# Patient Record
Sex: MALE | Race: WHITE | Employment: OTHER | RURAL
[De-identification: names, ages, dates, MRNs, and addresses within clinical notes are randomized per-mention and may not be internally consistent; named-entity substitution may affect disease eponyms.]

---

## 2017-01-16 DIAGNOSIS — I48.0 INTERMITTENT ATRIAL FIBRILLATION (HCC): Primary | ICD-10-CM

## 2017-01-16 RX ORDER — METOPROLOL SUCCINATE 100 MG/1
100 TABLET, EXTENDED RELEASE ORAL DAILY
Qty: 30 TAB | Refills: 5 | Status: SHIPPED | OUTPATIENT
Start: 2017-01-16 | End: 2018-03-05 | Stop reason: SDUPTHER

## 2017-05-10 RX ORDER — ASPIRIN 81 MG/1
81 TABLET ORAL DAILY
COMMUNITY
Start: 2017-05-10 | End: 2018-10-23

## 2017-05-10 RX ORDER — ALBUTEROL SULFATE 90 UG/1
1 AEROSOL, METERED RESPIRATORY (INHALATION)
Qty: 1 INHALER | Refills: 11 | COMMUNITY
Start: 2017-05-10 | End: 2022-10-18 | Stop reason: ALTCHOICE

## 2017-05-10 RX ORDER — MELATONIN
2000 DAILY
COMMUNITY
Start: 2017-05-10 | End: 2022-10-18 | Stop reason: ALTCHOICE

## 2017-06-26 RX ORDER — METOPROLOL TARTRATE 50 MG/1
TABLET ORAL AS NEEDED
COMMUNITY
Start: 2017-06-26 | End: 2018-03-05 | Stop reason: SDUPTHER

## 2017-06-26 RX ORDER — FLECAINIDE ACETATE 150 MG/1
TABLET ORAL
Qty: 1 TAB | Refills: 0 | COMMUNITY
Start: 2017-06-26 | End: 2018-10-11

## 2017-08-01 ENCOUNTER — OFFICE VISIT (OUTPATIENT)
Dept: FAMILY MEDICINE CLINIC | Age: 57
End: 2017-08-01

## 2017-08-01 VITALS
HEIGHT: 72 IN | HEART RATE: 60 BPM | SYSTOLIC BLOOD PRESSURE: 100 MMHG | WEIGHT: 187 LBS | BODY MASS INDEX: 25.33 KG/M2 | RESPIRATION RATE: 16 BRPM | DIASTOLIC BLOOD PRESSURE: 66 MMHG | OXYGEN SATURATION: 97 %

## 2017-08-01 DIAGNOSIS — E55.9 VITAMIN D DEFICIENCY: ICD-10-CM

## 2017-08-01 DIAGNOSIS — Z00.00 ANNUAL PHYSICAL EXAM: ICD-10-CM

## 2017-08-01 DIAGNOSIS — I48.0 INTERMITTENT ATRIAL FIBRILLATION (HCC): Primary | ICD-10-CM

## 2017-08-01 NOTE — ACP (ADVANCE CARE PLANNING)
Patient states he has an Advance Directive, asked to bring in a copy, is from [place] so might need updated.

## 2017-08-01 NOTE — PROGRESS NOTES
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 He is  Jose Led is an FNP). 2 children-daughter is engaged and son, Jessica Maradiaga, lives at home but will be moving to [place] in Jan 2018. Joseline Gibbs has enjoyed good health. Four discrete episodes of AF and remains in NSR. Follows \"pill in pocket\" strategy and this has worked well for him. Hypoxia at altitude seems to be a trigger. Diamox helped last time they stayed at their  [facility]. FH is + for colon cancer in father and prostate cancer in grandfather. PSA screening had a peak value of 5 and then reverted to normal--felt to be due to cycling. He enjoys traveling and biking. Practiced in [place]. Not on File    Current Outpatient Prescriptions   Medication Sig    flecainide (TAMBOCOR) 150 mg tablet 2 tabs as directed for afib    metoprolol tartrate (LOPRESSOR) 50 mg tablet Daily as directed    cholecalciferol (VITAMIN D3) 1,000 unit tablet Take 2 Tabs by mouth daily.  aspirin delayed-release 81 mg tablet Take 1 Tab by mouth daily.  albuterol (PROVENTIL HFA, VENTOLIN HFA, PROAIR HFA) 90 mcg/actuation inhaler Take 1 Puff by inhalation every six (6) hours as needed for Wheezing. Indications: BRONCHOSPASM PREVENTION    metoprolol succinate (TOPROL-XL) 100 mg tablet Take 1 Tab by mouth daily. No current facility-administered medications for this visit. Past Medical History:   Diagnosis Date    Asthma, mild intermittent     Family history of prostate cancer     Grandfather    Intermittent atrial fibrillation ([facility] [address] 1/16/2017    Left hydrocele     Personal history of colonic polyps     Negative colonoscopy 2015    Vitamin D deficiency          ROS:  Denies fever, chills, cough, chest pain, SOB,  nausea, vomiting, or diarrhea. Denies wt loss, wt gain, hemoptysis, hematochezia or melena.     Physical Examination:    /66 (BP 1 Location: Left arm, BP Patient Position: Sitting)  Pulse 60  Resp 16  Ht 6' (1.829 m)  Wt 187 lb (84.8 kg)  SpO2 97%  BMI 25.36 kg/m2    General: Alert and Ox3, Fluent speech  HEENT:  NC/AT, EOMI, OP: clear  Neck:  Supple, no adenopathy, JVD, mass or bruit  Chest:  Clear to Ausculation, without wheezes, rales, rubs or ronchi  Cardiac: RRR  Abdomen:  +BS, soft, nontender without palpable HSM  Extremities:  No cyanosis, clubbing or edema  Neurologic:  Ambulatory without assist, CN 2-12 grossly intact. Moves all extremities. Skin: no rash  Lymphadenopathy: no cervical or supraclavicular nodes      ASSESSMENT AND PLAN:     1. Beronica Nuñez appears to be in excellent health. Labs today and will contact him with the results  2. PAF: stable and presently in NSR  3.  Vitamin D def:  Checking level  4. Colon cancer screening. 5.  PSA screening.     Orders Placed This Encounter    VITAMIN D, 25 HYDROXY    PROSTATE SPECIFIC AG    METABOLIC PANEL, COMPREHENSIVE    LIPID PANEL    CBC WITH AUTOMATED DIFF       Jose A Kay MD, [address]

## 2017-08-01 NOTE — MR AVS SNAPSHOT
Visit Information Date & Time Provider Department Dept. Phone Encounter #  
 8/1/2017 10:30 AM Omer Mcqueen MD [address] [place] [address] [id number] Upcoming Health Maintenance Date Due Hepatitis C Screening 1960 FOBT Q 1 YEAR AGE 50-75 5/19/2008 INFLUENZA AGE 9 TO ADULT 10/30/2017* DTaP/Tdap/Td series (2 - Td) 7/3/2018 *Topic was postponed. The date shown is not the original due date. Allergies as of 8/1/2017  Never Reviewed Not on File Current Immunizations  Never Reviewed Name Date Pneumococcal Polysaccharide (PPSV-23) 10/18/2012 Tdap 7/3/2008 Not reviewed this visit You Were Diagnosed With   
  
 Codes Comments Intermittent atrial fibrillation (HCC)    -  Primary ICD-10-CM: I48.0 ICD-9-CM: 427.31 Vitamin D deficiency     ICD-10-CM: E55.9 ICD-9-CM: 268.9 Vitals BP Pulse Resp Height(growth percentile) Weight(growth percentile) SpO2  
 100/66 (BP 1 Location: Left arm, BP Patient Position: Sitting) 60 16 6' (1.829 m) 187 lb (84.8 kg) 97% BMI Smoking Status 25.36 kg/m2 Never Smoker BMI and BSA Data Body Mass Index Body Surface Area  
 25.36 kg/m 2 2.08 m 2 Preferred Pharmacy Pharmacy Name Phone [facility] [address] [place] [address] Your Updated Medication List  
  
   
This list is accurate as of: 8/1/17 11:20 AM.  Always use your most recent med list.  
  
  
  
  
 albuterol 90 mcg/actuation inhaler Commonly known as:  PROVENTIL HFA, VENTOLIN HFA, PROAIR HFA Take 1 Puff by inhalation every six (6) hours as needed for Wheezing. Indications: BRONCHOSPASM PREVENTION  
  
 aspirin delayed-release 81 mg tablet Take 1 Tab by mouth daily. cholecalciferol 1,000 unit tablet Commonly known as:  VITAMIN D3 Take 2 Tabs by mouth daily. flecainide 150 mg tablet Commonly known as:  TAMBOCOR  
 2 tabs as directed for afib  
  
 metoprolol succinate 100 mg tablet Commonly known as:  TOPROL-XL Take 1 Tab by mouth daily. metoprolol tartrate 50 mg tablet Commonly known as:  LOPRESSOR Daily as directed Patient Instructions If you have any questions regarding GLAMSQUAD, you may call GLAMSQUAD support at [phone]. Introducing [facility]! [employer] introduces [employer] patient portal. Now you can access parts of your medical record, email your doctor's office, and request medication refills online. 1. In your internet browser, go to [email or web address] 2. Click on the First Time User? Click Here link in the Sign In box. You will see the New Member Sign Up page. 3. Enter your [employer] Access Code exactly as it appears below. You will not need to use this code after youve completed the sign-up process. If you do not sign up before the expiration date, you must request a new code. · [employer] Access Code: [email or web address] Expires: 10/30/2017 10:08 AM 
 
4. Enter the last four digits of your Social Security Number (xxxx) and Date of Birth (mm/dd/yyyy) as indicated and click Submit. You will be taken to the next sign-up page. 5. Create a Jama Software ID. This will be your [employer] Software login ID and cannot be changed, so think of one that is secure and easy to remember. 6. Create a [employer] password. You can change your password at any time. 7. Enter your Password Reset Question and Answer. This can be used at a later time if you forget your password. 8. Enter your e-mail address. You will receive e-mail notification when new information is available in [address] 9. Click Sign Up. You can now view and download portions of your medical record. 10. Click the Download Summary menu link to download a portable copy of your medical information.  
 
If you have questions, please visit the Frequently Asked Questions section of the CrowdComfort. Remember, Diet TVhart is NOT to be used for urgent needs. For medical emergencies, dial 911. Now available from your iPhone and Android! Please provide this summary of care documentation to your next provider. Your primary care clinician is listed as Talya Wiley. If you have any questions after today's visit, please call [phone].

## 2017-08-02 LAB
25(OH)D3+25(OH)D2 SERPL-MCNC: 42 NG/ML (ref 30–100)
ALBUMIN SERPL-MCNC: 4.4 G/DL (ref 3.5–5.5)
ALBUMIN/GLOB SERPL: 1.8 {RATIO} (ref 1.2–2.2)
ALP SERPL-CCNC: 49 IU/L (ref 39–117)
ALT SERPL-CCNC: 13 IU/L (ref 0–44)
AST SERPL-CCNC: 19 IU/L (ref 0–40)
BASOPHILS # BLD AUTO: 0 X10E3/UL (ref 0–0.2)
BASOPHILS NFR BLD AUTO: 1 %
BILIRUB SERPL-MCNC: 0.8 MG/DL (ref 0–1.2)
BUN SERPL-MCNC: 11 MG/DL (ref 6–24)
BUN/CREAT SERPL: 13 (ref 9–20)
CALCIUM SERPL-MCNC: 9.2 MG/DL (ref 8.7–10.2)
CHLORIDE SERPL-SCNC: 101 MMOL/L (ref 96–106)
CHOLEST SERPL-MCNC: 221 MG/DL (ref 100–199)
CO2 SERPL-SCNC: 26 MMOL/L (ref 18–29)
CREAT SERPL-MCNC: 0.84 MG/DL (ref 0.76–1.27)
EOSINOPHIL # BLD AUTO: 0.1 X10E3/UL (ref 0–0.4)
EOSINOPHIL NFR BLD AUTO: 1 %
ERYTHROCYTE [DISTWIDTH] IN BLOOD BY AUTOMATED COUNT: 13.5 % (ref 12.3–15.4)
GLOBULIN SER CALC-MCNC: 2.4 G/DL (ref 1.5–4.5)
GLUCOSE SERPL-MCNC: 118 MG/DL (ref 65–99)
HCT VFR BLD AUTO: 45.2 % (ref 37.5–51)
HDLC SERPL-MCNC: 67 MG/DL
HGB BLD-MCNC: 15.6 G/DL (ref 12.6–17.7)
IMM GRANULOCYTES # BLD: 0 X10E3/UL (ref 0–0.1)
IMM GRANULOCYTES NFR BLD: 0 %
LDLC SERPL CALC-MCNC: 136 MG/DL (ref 0–99)
LYMPHOCYTES # BLD AUTO: 1.3 X10E3/UL (ref 0.7–3.1)
LYMPHOCYTES NFR BLD AUTO: 21 %
MCH RBC QN AUTO: 31.3 PG (ref 26.6–33)
MCHC RBC AUTO-ENTMCNC: 34.5 G/DL (ref 31.5–35.7)
MCV RBC AUTO: 91 FL (ref 79–97)
MONOCYTES # BLD AUTO: 0.5 X10E3/UL (ref 0.1–0.9)
MONOCYTES NFR BLD AUTO: 8 %
NEUTROPHILS # BLD AUTO: 4.2 X10E3/UL (ref 1.4–7)
NEUTROPHILS NFR BLD AUTO: 69 %
PLATELET # BLD AUTO: 196 X10E3/UL (ref 150–379)
POTASSIUM SERPL-SCNC: 4.4 MMOL/L (ref 3.5–5.2)
PROT SERPL-MCNC: 6.8 G/DL (ref 6–8.5)
PSA SERPL-MCNC: 2.3 NG/ML (ref 0–4)
RBC # BLD AUTO: 4.98 X10E6/UL (ref 4.14–5.8)
SODIUM SERPL-SCNC: 141 MMOL/L (ref 134–144)
TRIGL SERPL-MCNC: 91 MG/DL (ref 0–149)
VLDLC SERPL CALC-MCNC: 18 MG/DL (ref 5–40)
WBC # BLD AUTO: 6 X10E3/UL (ref 3.4–10.8)

## 2017-10-24 ENCOUNTER — TELEPHONE (OUTPATIENT)
Dept: FAMILY MEDICINE CLINIC | Age: 57
End: 2017-10-24

## 2017-10-24 RX ORDER — CEPHALEXIN 500 MG/1
500 CAPSULE ORAL 4 TIMES DAILY
Qty: 40 CAP | Refills: 0 | Status: SHIPPED | OUTPATIENT
Start: 2017-10-24 | End: 2017-10-31 | Stop reason: SDUPTHER

## 2017-10-24 NOTE — TELEPHONE ENCOUNTER
Right elbow red and swollen and tender  Per pt since weekend with low grade temp  Will call in Keflex

## 2017-10-31 RX ORDER — CEPHALEXIN 500 MG/1
500 CAPSULE ORAL 4 TIMES DAILY
Qty: 40 CAP | Refills: 0 | Status: SHIPPED | OUTPATIENT
Start: 2017-10-31 | End: 2017-11-10

## 2017-10-31 NOTE — PROGRESS NOTES
Redness of right elbow better but still some present  Still puffy and a little tender  May need another course of Keflex  Called in a refill

## 2017-11-09 ENCOUNTER — TELEPHONE (OUTPATIENT)
Dept: FAMILY MEDICINE CLINIC | Age: 57
End: 2017-11-09

## 2017-11-09 DIAGNOSIS — I48.0 PAROXYSMAL ATRIAL FIBRILLATION (HCC): Primary | ICD-10-CM

## 2017-12-04 ENCOUNTER — OFFICE VISIT (OUTPATIENT)
Dept: CARDIOLOGY CLINIC | Age: 57
End: 2017-12-04

## 2017-12-04 VITALS
DIASTOLIC BLOOD PRESSURE: 72 MMHG | HEART RATE: 51 BPM | BODY MASS INDEX: 25.47 KG/M2 | WEIGHT: 188 LBS | HEIGHT: 72 IN | SYSTOLIC BLOOD PRESSURE: 104 MMHG | OXYGEN SATURATION: 98 %

## 2017-12-04 DIAGNOSIS — I48.0 PAROXYSMAL A-FIB (HCC): Primary | ICD-10-CM

## 2017-12-04 NOTE — PROGRESS NOTES
PATIENT ID VERIFIED WITH TWO PATIENT IDENTIFIERS. PATIENT MEDICATIONS REVIEWED AND APPROVED BY DR. Nelsy Bahena. MEDICATIONS THAT WERE REMOVED FROM THIS VISIT HAVE BEEN APPROVED BY DR. Nelsy Bahena. Chief Complaint   Patient presents with   [facility]     New patient evaluation     Irregular Heart Beat       1. Have you been to the ER, urgent care clinic since your last visit? Hospitalized since your last visit? New patient evaluation    2. Have you seen or consulted any other health care providers outside of the [address] [place] [address] since your last visit?   New patient evaluation

## 2017-12-04 NOTE — MR AVS SNAPSHOT
Visit Information Date & Time Provider Department Dept. Phone Encounter #  
 12/4/2017  3:20 PM Janis Guerra, [facility] [phone] [id number] Upcoming Health Maintenance Date Due Hepatitis C Screening 1960 FOBT Q 1 YEAR AGE 50-75 5/19/2008 DTaP/Tdap/Td series (2 - Td) 7/3/2018 Allergies as of 12/4/2017  Review Complete On: 12/4/2017 By: Yuan Loco No Known Allergies Current Immunizations  Reviewed on 9/25/2017 Name Date Influenza Vaccine 9/25/2017 Pneumococcal Polysaccharide (PPSV-23) 10/18/2012 Tdap 7/3/2008 Not reviewed this visit You Were Diagnosed With   
  
 Codes Comments Paroxysmal a-fib (HCC)    -  Primary ICD-10-CM: I48.0 ICD-9-CM: 427.31 Vitals BP Pulse Height(growth percentile) Weight(growth percentile) SpO2 BMI  
 104/72 (BP 1 Location: Right arm, BP Patient Position: Sitting) (!) 51 6' (1.829 m) 188 lb (85.3 kg) 98% 25.5 kg/m2 Smoking Status Never Smoker Vitals History BMI and BSA Data Body Mass Index Body Surface Area 25.5 kg/m 2 2.08 m 2 Preferred Pharmacy Pharmacy Name Phone [facility] [address] [place] [address], [address] [place] [address] Your Updated Medication List  
  
   
This list is accurate as of: 12/4/17  3:58 PM.  Always use your most recent med list.  
  
  
  
  
 albuterol 90 mcg/actuation inhaler Commonly known as:  PROVENTIL HFA, VENTOLIN HFA, PROAIR HFA Take 1 Puff by inhalation every six (6) hours as needed for Wheezing. Indications: BRONCHOSPASM PREVENTION  
  
 aspirin delayed-release 81 mg tablet Take 1 Tab by mouth daily. cholecalciferol 1,000 unit tablet Commonly known as:  VITAMIN D3 Take 2 Tabs by mouth daily. flecainide 150 mg tablet Commonly known as:  TAMBOCOR  
2 tabs prn as directed for afib  
  
 metoprolol succinate 100 mg tablet Commonly known as:  TOPROL-XL Take 1 Tab by mouth daily. metoprolol tartrate 50 mg tablet Commonly known as:  LOPRESSOR  
as needed. Daily as directed We Performed the Following AMB POC EKG ROUTINE W/ 12 LEADS, INTER & REP [47063 CPT(R)] To-Do List   
 Around 12/07/2017 ECG:  STRESS TEST CARDIAC Introducing [facility]! [employer] introduces el? patient portal. Now you can access parts of your medical record, email your doctor's office, and request medication refills online. 1. In your internet browser, go to [email or web address] 2. Click on the First Time User? Click Here link in the Sign In box. You will see the New Member Sign Up page. 3. Enter your el? Access Code exactly as it appears below. You will not need to use this code after youve completed the sign-up process. If you do not sign up before the expiration date, you must request a new code. · el? Access Code: [email or web address] Expires: 3/4/2018  3:57 PM 
 
4. Enter the last four digits of your Social Security Number (xxxx) and Date of Birth (mm/dd/yyyy) as indicated and click Submit. You will be taken to the next sign-up page. 5. Create a el? ID. This will be your el? login ID and cannot be changed, so think of one that is secure and easy to remember. 6. Create a el? password. You can change your password at any time. 7. Enter your Password Reset Question and Answer. This can be used at a later time if you forget your password. 8. Enter your e-mail address. You will receive e-mail notification when new information is available in [address] 9. Click Sign Up. You can now view and download portions of your medical record. 10. Click the Download Summary menu link to download a portable copy of your medical information.  
 
If you have questions, please visit the Frequently Asked Questions section of the Shiftgig. Remember, Qbox.iohart is NOT to be used for urgent needs. For medical emergencies, dial 911. Now available from your iPhone and Android! Please provide this summary of care documentation to your next provider. Your primary care clinician is listed as Slava Perez. If you have any questions after today's visit, please call [phone].

## 2017-12-04 NOTE — PROGRESS NOTES
Favio Marvin is a 59 y.o. male is here to establish local cardiac care. Hx paroxysmal afib/lone afib (structurally normal heart), onset 6 yrs ago. Initial episode with IV cardizem, to NSR and on metoprolol XL 50. Had w/u with stress MPI and Echo/dopper--normal.  Recurrent episode, and given po flecainide with restoration of NSR. Previous Cardiologist opted for \"pill in pocket\" approach--takes Metoprolol XL 50mg every day, has metoprolol tartrate and flecainide to take if recurrent sustained episode. Last episode(s) triggered by staying in cabin in [place] at [facility], no problems since as takes diamox prior. Last stress test 2 yrs ago. Takes ASA 81, CHADs2-VASC is 0. Physically active--bikes, nordic track, kayaks, etc.  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 No hx hypertension, DM, CHF, valvular, other. Mild dyslipidemia, dietary rx. The patient denies chest pain/ shortness of breath, orthopnea, PND, LE edema, palpitations, syncope, presyncope or fatigue. Patient Active Problem List    Diagnosis Date Noted    Vitamin D deficiency     Personal history of colonic polyps     Intermittent atrial fibrillation ([facility] [address] 01/16/2017      Murphy العراقي MD  Past Medical History:   Diagnosis Date    Asthma, mild intermittent     Family history of prostate cancer     Grandfather    Intermittent atrial fibrillation ([facility] [address] 1/16/2017    Left hydrocele     Personal history of colonic polyps     Negative colonoscopy 2015    Vitamin D deficiency       Past Surgical History:   Procedure Laterality Date    HX ARTHROPLASTY Right     HX COLONOSCOPY  06/11/2015     No Known Allergies   No family history on file. Social History     Social History    Marital status:      Spouse name: N/A    Number of children: N/A    Years of education: N/A     Occupational History    Not on file.      Social History Main Topics    Smoking status: Never Smoker    Smokeless tobacco: Never Used    Alcohol use Not on file    Drug use: Not on file    Sexual activity: Not on file     Other Topics Concern    Not on file     Social History Narrative      Current Outpatient Prescriptions   Medication Sig    metoprolol tartrate (LOPRESSOR) 50 mg tablet as needed. Daily as directed    cholecalciferol (VITAMIN D3) 1,000 unit tablet Take 2 Tabs by mouth daily.  aspirin delayed-release 81 mg tablet Take 1 Tab by mouth daily.  albuterol (PROVENTIL HFA, VENTOLIN HFA, PROAIR HFA) 90 mcg/actuation inhaler Take 1 Puff by inhalation every six (6) hours as needed for Wheezing. Indications: BRONCHOSPASM PREVENTION    metoprolol succinate (TOPROL-XL) 100 mg tablet Take 1 Tab by mouth daily. (Patient taking differently: Take 50 mg by mouth daily.)    flecainide (TAMBOCOR) 150 mg tablet 2 tabs prn as directed for afib     No current facility-administered medications for this visit. Review of Symptoms:    CONST  No weight change. No fever, chills, sweats    ENT No visual changes, URI sx, sore throat    CV  See HPI   RESP  No cough, or sputum, wheezing. Also see HPI   GI  No abdominal pain or change in bowel habits. No heartburn or dysphagia. No melena or rectal bleeding.   No dysuria, urgency, frequency, hematuria   MSKEL  No joint pain, swelling. No muscle pain. SKIN  No rash or lesions. NEURO  No headache, syncope, or seizure. No weakness, loss of sensation, or paresthesias. PSYCH  No low mood or depression  No anxiety. HE/LYMPH  No easy bruising, abnormal bleeding, or enlarged glands.         Physical ExamPhysical Exam:    Visit Vitals    /72 (BP 1 Location: Right arm, BP Patient Position: Sitting)    Pulse (!) 51    Ht 6' (1.829 m)    Wt 188 lb (85.3 kg)    SpO2 98%    BMI 25.5 kg/m2     Gen: NAD  HEENT:  PERRL, throat clear  Neck: no adenopathy, no thyromegaly, no JVD   Heart:  Regular,Nl S1S2,  no murmur, gallop or rub.   Lungs:  clear  Abdomen:   Soft, non-tender, bowel sounds are active.   Extremities:  No edema  Pulse: symmetric  Neuro: A&O times 3, No focal neuro deficits    Cardiographics    ECG: NSR, IRBBB, LAFB      Labs:   Lab Results   Component Value Date/Time    Sodium 141 08/01/2017 11:21 AM    Potassium 4.4 08/01/2017 11:21 AM    Chloride 101 08/01/2017 11:21 AM    CO2 26 08/01/2017 11:21 AM    Glucose 118 08/01/2017 11:21 AM    BUN 11 08/01/2017 11:21 AM    Creatinine 0.84 08/01/2017 11:21 AM    BUN/Creatinine ratio 13 08/01/2017 11:21 AM    GFR est  08/01/2017 11:21 AM    GFR est non-AA 96 08/01/2017 11:21 AM    Calcium 9.2 08/01/2017 11:21 AM    Bilirubin, total 0.8 08/01/2017 11:21 AM    AST (SGOT) 19 08/01/2017 11:21 AM    Alk. phosphatase 49 08/01/2017 11:21 AM    Protein, total 6.8 08/01/2017 11:21 AM    Albumin 4.4 08/01/2017 11:21 AM    A-G Ratio 1.8 08/01/2017 11:21 AM    ALT (SGPT) 13 08/01/2017 11:21 AM     No results found for: CPK, CPKX, CPX  Lab Results   Component Value Date/Time    Cholesterol, total 221 08/01/2017 11:21 AM    HDL Cholesterol 67 08/01/2017 11:21 AM    LDL, calculated 136 08/01/2017 11:21 AM    Triglyceride 91 08/01/2017 11:21 AM     No results found for this or any previous visit. Assessment:         Patient Active Problem List    Diagnosis Date Noted    Vitamin D deficiency     Personal history of colonic polyps     Intermittent atrial fibrillation ([facility] [address] 01/16/2017       Hx paroxysmal afib/lone afib (structurally normal heart), onset 6 yrs ago. Initial episode with IV cardizem, to NSR and on metoprolol XL 50. Had w/u with stress MPI and Echo/dopper--normal.  Recurrent episode, and given po flecainide with restoration of NSR. Previous Cardiologist opted for \"pill in pocket\" approach--takes Metoprolol XL 50mg every day, has metoprolol tartrate and flecainide to take if recurrent sustained episode. Last episode(s) triggered by staying in cabin in [place] at [facility], no problems since as takes diamox prior. Last stress test 2 yrs ago. Takes ASA 81, CHADs2-VASC is 0. Physically active--bikes, nordic track, kayaks, etc.  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 No hx hypertension, DM, CHF, valvular, other. Mild dyslipidemia, dietary rx. Plan:     Doing well with no adverse cardiac symptoms--pill in pocket approach as outlined very reasonable  Stress test (last 2 yrs ago, using type Ic antiarrhythmic rx)  F/u with PCP as planned--  Lipids and labs followed by PCP. Continue current care and f/u in 12 months.     Ashly Paris MD

## 2018-03-05 DIAGNOSIS — I48.0 INTERMITTENT ATRIAL FIBRILLATION (HCC): ICD-10-CM

## 2018-03-05 RX ORDER — METOPROLOL SUCCINATE 100 MG/1
50 TABLET, EXTENDED RELEASE ORAL DAILY
Qty: 30 TAB | Refills: 5 | Status: SHIPPED | OUTPATIENT
Start: 2018-03-05 | End: 2018-03-05 | Stop reason: SDUPTHER

## 2018-03-08 RX ORDER — METOPROLOL SUCCINATE 100 MG/1
TABLET, EXTENDED RELEASE ORAL
Qty: 90 TAB | Refills: 0 | Status: SHIPPED | OUTPATIENT
Start: 2018-03-08 | End: 2018-07-14 | Stop reason: SDUPTHER

## 2018-04-02 ENCOUNTER — TELEPHONE (OUTPATIENT)
Dept: FAMILY MEDICINE CLINIC | Age: 58
End: 2018-04-02

## 2018-04-02 DIAGNOSIS — Z86.010 HISTORY OF COLON POLYPS: Primary | ICD-10-CM

## 2018-06-15 ENCOUNTER — ANESTHESIA (OUTPATIENT)
Dept: ENDOSCOPY | Age: 58
End: 2018-06-15
Payer: COMMERCIAL

## 2018-06-15 ENCOUNTER — HOSPITAL ENCOUNTER (OUTPATIENT)
Age: 58
Setting detail: OUTPATIENT SURGERY
Discharge: HOME | End: 2018-06-15
Attending: INTERNAL MEDICINE | Admitting: INTERNAL MEDICINE
Payer: COMMERCIAL

## 2018-06-15 ENCOUNTER — ANESTHESIA EVENT (OUTPATIENT)
Dept: ENDOSCOPY | Age: 58
End: 2018-06-15
Payer: COMMERCIAL

## 2018-06-15 VITALS
DIASTOLIC BLOOD PRESSURE: 69 MMHG | TEMPERATURE: 97.7 F | SYSTOLIC BLOOD PRESSURE: 103 MMHG | BODY MASS INDEX: 25.6 KG/M2 | OXYGEN SATURATION: 98 % | WEIGHT: 189 LBS | HEIGHT: 72 IN | RESPIRATION RATE: 9 BRPM | HEART RATE: 74 BPM

## 2018-06-15 PROCEDURE — 74011250636 HC RX REV CODE- 250/636

## 2018-06-15 PROCEDURE — 74011250637 HC RX REV CODE- 250/637: Performed by: INTERNAL MEDICINE

## 2018-06-15 PROCEDURE — 74011000250 HC RX REV CODE- 250

## 2018-06-15 PROCEDURE — 76060000031 HC ANESTHESIA FIRST 0.5 HR: Performed by: INTERNAL MEDICINE

## 2018-06-15 PROCEDURE — 74011250636 HC RX REV CODE- 250/636: Performed by: INTERNAL MEDICINE

## 2018-06-15 PROCEDURE — 76040000019: Performed by: INTERNAL MEDICINE

## 2018-06-15 RX ORDER — SODIUM CHLORIDE 0.9 % (FLUSH) 0.9 %
5-10 SYRINGE (ML) INJECTION AS NEEDED
Status: DISCONTINUED | OUTPATIENT
Start: 2018-06-15 | End: 2018-06-15 | Stop reason: HOSPADM

## 2018-06-15 RX ORDER — FLUMAZENIL 0.1 MG/ML
0.2 INJECTION INTRAVENOUS
Status: DISCONTINUED | OUTPATIENT
Start: 2018-06-15 | End: 2018-06-15 | Stop reason: HOSPADM

## 2018-06-15 RX ORDER — SODIUM CHLORIDE 0.9 % (FLUSH) 0.9 %
5-10 SYRINGE (ML) INJECTION EVERY 8 HOURS
Status: DISCONTINUED | OUTPATIENT
Start: 2018-06-15 | End: 2018-06-15 | Stop reason: HOSPADM

## 2018-06-15 RX ORDER — MIDAZOLAM HYDROCHLORIDE 1 MG/ML
.25-5 INJECTION, SOLUTION INTRAMUSCULAR; INTRAVENOUS
Status: DISCONTINUED | OUTPATIENT
Start: 2018-06-15 | End: 2018-06-15 | Stop reason: HOSPADM

## 2018-06-15 RX ORDER — ATROPINE SULFATE 0.1 MG/ML
0.5 INJECTION INTRAVENOUS
Status: DISCONTINUED | OUTPATIENT
Start: 2018-06-15 | End: 2018-06-15 | Stop reason: HOSPADM

## 2018-06-15 RX ORDER — PROPOFOL 10 MG/ML
INJECTION, EMULSION INTRAVENOUS AS NEEDED
Status: DISCONTINUED | OUTPATIENT
Start: 2018-06-15 | End: 2018-06-15 | Stop reason: HOSPADM

## 2018-06-15 RX ORDER — LIDOCAINE HYDROCHLORIDE 20 MG/ML
INJECTION, SOLUTION EPIDURAL; INFILTRATION; INTRACAUDAL; PERINEURAL AS NEEDED
Status: DISCONTINUED | OUTPATIENT
Start: 2018-06-15 | End: 2018-06-15 | Stop reason: HOSPADM

## 2018-06-15 RX ORDER — DEXTROMETHORPHAN/PSEUDOEPHED 2.5-7.5/.8
1.2 DROPS ORAL
Status: DISCONTINUED | OUTPATIENT
Start: 2018-06-15 | End: 2018-06-15 | Stop reason: HOSPADM

## 2018-06-15 RX ORDER — SODIUM CHLORIDE 9 MG/ML
100 INJECTION, SOLUTION INTRAVENOUS CONTINUOUS
Status: DISCONTINUED | OUTPATIENT
Start: 2018-06-15 | End: 2018-06-15 | Stop reason: HOSPADM

## 2018-06-15 RX ORDER — FENTANYL CITRATE 50 UG/ML
50 INJECTION, SOLUTION INTRAMUSCULAR; INTRAVENOUS
Status: DISCONTINUED | OUTPATIENT
Start: 2018-06-15 | End: 2018-06-15 | Stop reason: HOSPADM

## 2018-06-15 RX ORDER — NALOXONE HYDROCHLORIDE 0.4 MG/ML
0.4 INJECTION, SOLUTION INTRAMUSCULAR; INTRAVENOUS; SUBCUTANEOUS
Status: DISCONTINUED | OUTPATIENT
Start: 2018-06-15 | End: 2018-06-15 | Stop reason: HOSPADM

## 2018-06-15 RX ADMIN — PROPOFOL 300 MG: 10 INJECTION, EMULSION INTRAVENOUS at 08:45

## 2018-06-15 RX ADMIN — SODIUM CHLORIDE 100 ML/HR: 900 INJECTION, SOLUTION INTRAVENOUS at 08:22

## 2018-06-15 RX ADMIN — LIDOCAINE HYDROCHLORIDE 50 MG: 20 INJECTION, SOLUTION EPIDURAL; INFILTRATION; INTRACAUDAL; PERINEURAL at 08:30

## 2018-06-15 RX ADMIN — SIMETHICONE 80 MG: 20 SUSPENSION/ DROPS ORAL at 08:35

## 2018-06-15 NOTE — ANESTHESIA POSTPROCEDURE EVALUATION
Post-Anesthesia Evaluation and Assessment    Patient: Addy De Dios MRN: [id number]  SSN: [id number]    YOB: 1960  Age: 59 y.o. Sex: male       Cardiovascular Function/Vital Signs  Visit Vitals    /69    Pulse 74    Temp 36.5 °C (97.7 °F)    Resp 9    Ht 6' (1.829 m)    Wt 85.7 kg (189 lb)    SpO2 98%    BMI 25.63 kg/m2       Patient is status post general, total IV anesthesia anesthesia for Procedure(s):  COLONOSCOPY. Nausea/Vomiting: None    Postoperative hydration reviewed and adequate. Pain:  Pain Scale 1: Numeric (0 - 10) (06/15/18 0912)  Pain Intensity 1: 0 (06/15/18 0912)   Managed    Neurological Status: At baseline    Mental Status and Level of Consciousness: Arousable    Pulmonary Status:   O2 Device: Room air (06/15/18 0912)   Adequate oxygenation and airway patent    Complications related to anesthesia: None    Post-anesthesia assessment completed.  No concerns    Signed By: Coby Rowe MD     Iva 15, 2018

## 2018-06-15 NOTE — ROUTINE PROCESS
Evan Fajardo  1960  [id number]    Situation:  Verbal report received from: Krystyna Ross  Procedure: Procedure(s):  COLONOSCOPY    Background:    Preoperative diagnosis: FAMILY HISTORY COLON CANCER  Postoperative diagnosis: Diverticulosis. hemorrhoids, history of polyps    :  Dr. Karlee Mccann  Assistant(s): Endoscopy Technician-1: Cece Talavera  Endoscopy RN-1: Yadira Johnson RN    Specimens: * No specimens in log *  H. Pylori  no    Assessment:  Intra-procedure medications   Anesthesia gave intra-procedure sedation and medications, see anesthesia flow sheet yes    Intravenous fluids: NS@ KVO     Vital signs stable     Abdominal assessment: round and soft     Recommendation:  Discharge patient per MD order.   Return to floor  Family or Friend   Permission to share finding with family or friend yes

## 2018-06-15 NOTE — PERIOP NOTES
Anesthesia reports 300mg Propofol, 50mg Lidocaine and 300mL NS given during procedure. Received report from anesthesia staff on vital signs and status of patient.

## 2018-06-15 NOTE — PROCEDURES
[facility]                  Colonoscopy Operative Report    6/15/2018      Lisette Montemayor  [id number]  1960    Procedure Type:   Colonoscopy --screening     Indications:    Family history of coloretal cancer (screening only), Personal history of colon polyps (screening only)     Pre-operative Diagnosis: see indication above    Post-operative Diagnosis:  See findings below    :  Tay Pickard MD    Referring Provider: Arianna Helton MD      Sedation:  MAC anesthesia Propofol    Pre-Procedural Exam:      Airway: clear,  No airway problems anticipated  Heart: RRR, without gallops or rubs  Lungs: clear bilaterally without wheezes, crackles, or rhonchi  Abdomen: soft, nontender, nondistended, bowel sounds present  Mental Status: awake, alert and oriented to person, place and time     Procedure Details:  After informed consent was obtained with all risks and benefits of procedure explained and preoperative exam completed, the patient was taken to the endoscopy suite and placed in the left lateral decubitus position. Upon sequential sedation as per above, a digital rectal exam was performed . The Olympus videocolonoscope  was inserted in the rectum and carefully advanced to the cecum, which was identified by the ileocecal valve and appendiceal orifice. The cecum was identified by the ileocecal valve and appendiceal orifice. The quality of preparation was good. The colonoscope was slowly withdrawn with careful evaluation between folds. Retroflexion in the rectum was completed demonstrating internal hemorrhoids. Findings:   Rectum: Grade 1 internal hemorrhoid(s);   Sigmoid:     - Diverticulosis, mild  Descending Colon:     - Diverticulosis, mild  Transverse Colon: no mucosal lesion appreciated  Ascending Colon: no mucosal lesion appreciated  Cecum: no mucosal lesion appreciated  Terminal Ileum: not intubated      Specimen Removed:  none    Complications: None.     EBL:  None. Impression:    Mild left sided diverticulosis and small internal hemorrhoids. Recommendations: --Repeat colonoscopy in 5 years. High fiber diet. Resume normal medication(s). Discharge Disposition:  Home in the company of a  when able to ambulate. Kathryn Burk MD    6/15/2018     JOSE Chris MD  [facility], [address] [place] [address]  [phone]  [email or web address]

## 2018-06-15 NOTE — DISCHARGE INSTRUCTIONS
Nola Ramirez MD  [facility], [address]  [place], [address] [facility]  [phone]  [email or web address]    Royden Alpers  [id number]  1960    COLON DISCHARGE INSTRUCTIONS  Discomfort:  Redness at IV site- apply warm compress to area; if redness or soreness persist- contact your physician  There may be a slight amount of blood passed from the rectum  Gaseous discomfort- walking, belching will help relieve any discomfort  You may not operate a vehicle for 12 hours  You may not engage in an occupation involving machinery or appliances for rest of today  You may not drink alcoholic beverages for at least 12 hours  Avoid making any critical decisions for at least 24 hour  DIET:   High fiber diet. - however -  remember your colon is empty and a heavy meal will produce gas. Avoid these foods:  vegetables, fried / greasy foods, carbonated drinks for today      ACTIVITY:  You may resume your normal daily activities it is recommended that you spend the remainder of the day resting -  avoid any strenuous activity. CALL M.D. ANY SIGN OF:   Increasing pain, nausea, vomiting  Abdominal distension (swelling)  New increased bleeding (oral or rectal)  Fever (chills)     COLONOSCOPY FINDINGS:  Your colonoscopy showed: mild left sided diverticulosis and small internal hemorrhoids. No polyps found. Follow-up Instructions:   Call Dr. Nola Ramirez if any questions or problems. Telephone # [phone]    Should have a repeat colonoscopy in 5 years.

## 2018-06-15 NOTE — IP AVS SNAPSHOT
[address] [facility] 
[phone] Patient: Manjeet Hidalgo MRN: [id number] About your hospitalization You were admitted on:  Iva 15, 2018 You last received care in the:  [facility] ENDOSCOPY You were discharged on:  Iva 15, 2018 Why you were hospitalized Your primary diagnosis was:  Not on File Follow-up Information Follow up With Details Comments Contact Info Mirian Alejandre MD   [address],[facility] Floor [facility] [phone] Discharge Orders None A check karen indicates which time of day the medication should be taken. My Medications CHANGE how you take these medications Instructions Each Dose to Equal  
 Morning Noon Evening Bedtime  
 metoprolol succinate 100 mg tablet Commonly known as:  TOPROL-XL What changed:   
- how much to take - when to take this 
- additional instructions Your last dose was: Your next dose is:    
   
   
 1/2 to 1 tab daily as directed CONTINUE taking these medications Instructions Each Dose to Equal  
 Morning Noon Evening Bedtime  
 albuterol 90 mcg/actuation inhaler Commonly known as:  PROVENTIL HFA, VENTOLIN HFA, PROAIR HFA Your last dose was: Your next dose is: Take 1 Puff by inhalation every six (6) hours as needed for Wheezing. Indications: BRONCHOSPASM PREVENTION  
 1 Puff  
    
   
   
   
  
 aspirin delayed-release 81 mg tablet Your last dose was: Your next dose is: Take 1 Tab by mouth daily. 81 mg  
    
   
   
   
  
 cholecalciferol 1,000 unit tablet Commonly known as:  VITAMIN D3 Your last dose was: Your next dose is: Take 2 Tabs by mouth daily. 2000 Units  
    
   
   
   
  
 flecainide 150 mg tablet Commonly known as:  TAMBOCOR Your last dose was: Your next dose is:    
   
   
 2 tabs prn as directed for afib Discharge Instructions Yohan Benton MD 
[facility], [facility], [address] 
[phone] 
[email or web address] [place] Alpers 
[id number] 1960 COLON DISCHARGE INSTRUCTIONS Discomfort: 
Redness at IV site- apply warm compress to area; if redness or soreness persist- contact your physician There may be a slight amount of blood passed from the rectum Gaseous discomfort- walking, belching will help relieve any discomfort You may not operate a vehicle for 12 hours You may not engage in an occupation involving machinery or appliances for rest of today You may not drink alcoholic beverages for at least 12 hours Avoid making any critical decisions for at least 24 hour DIET: 
 High fiber diet.  however -  remember your colon is empty and a heavy meal will produce gas. Avoid these foods:  vegetables, fried / greasy foods, carbonated drinks for today ACTIVITY: 
You may resume your normal daily activities it is recommended that you spend the remainder of the day resting -  avoid any strenuous activity. CALL M.D. ANY SIGN OF: Increasing pain, nausea, vomiting Abdominal distension (swelling) New increased bleeding (oral or rectal) Fever (chills) COLONOSCOPY FINDINGS: 
Your colonoscopy showed: mild left sided diverticulosis and small internal hemorrhoids. No polyps found. Follow-up Instructions: 
 Call Dr. Yohan Benton if any questions or problems. Telephone # [phone] Should have a repeat colonoscopy in 5 years. Introducing \[facility] [place] [facility]\"" & HEALTH SERVICES! Vinod Morris introduces Zing patient portal. Now you can access parts of your medical record, email your doctor's office, and request medication refills online.    
 
1. In your internet browser, go to [email or web address] 2. Click on the First Time User? Click Here link in the Sign In box. You will see the New Member Sign Up page. 3. Enter your icanbuy Access Code exactly as it appears below. You will not need to use this code after youve completed the sign-up process. If you do not sign up before the expiration date, you must request a new code. · icanbuy Access Code: [email or web address] Expires: 9/13/2018  7:36 AM 
 
4. Enter the last four digits of your Social Security Number (xxxx) and Date of Birth (mm/dd/yyyy) as indicated and click Submit. You will be taken to the next sign-up page. 5. Create a [employer] ID. This will be your icanbuy login ID and cannot be changed, so think of one that is secure and easy to remember. 6. Create a icanbuy password. You can change your password at any time. 7. Enter your Password Reset Question and Answer. This can be used at a later time if you forget your password. 8. Enter your e-mail address. You will receive e-mail notification when new information is available in [address] 9. Click Sign Up. You can now view and download portions of your medical record. 10. Click the Download Summary menu link to download a portable copy of your medical information. If you have questions, please visit the Frequently Asked Questions section of the icanbuy website. Remember, icanbuy is NOT to be used for urgent needs. For medical emergencies, dial 911. Now available from your iPhone and Android! Introducing Randolph Cortes As a [employer] patient, I wanted to make you aware of our electronic visit tool called Randolph Cortes. [employer] 24/7 allows you to connect within minutes with a medical provider 24 hours a day, seven days a week via a mobile device or tablet or logging into a secure website from your computer. You can access Randolph Cortes from anywhere in the [place]. A virtual visit might be right for you when you have a simple condition and feel like you just dont want to get out of bed, or cant get away from work for an appointment, when your regular [employer] provider is not available (evenings, weekends or holidays), or when youre out of town and need minor care. Electronic visits cost only $49 and if the [employer] 24/7 provider determines a prescription is needed to treat your condition, one can be electronically transmitted to a nearby pharmacy*. Please take a moment to enroll today if you have not already done so. The enrollment process is free and takes just a few minutes. To enroll, please download the [employer] 24/7 jeni to your tablet or phone, or visit [email or web address] to enroll on your computer. And, as an [address] [place] [address] patient with a [employer] account, the results of your visits will be scanned into your electronic medical record and your primary care provider will be able to view the scanned results. We urge you to continue to see your regular [employer] provider for your ongoing medical care. And while your primary care provider may not be the one available when you seek a [employer] virtual visit, the peace of mind you get from getting a real diagnosis real time can be priceless. For more information on [employer], view our Frequently Asked Questions (FAQs) at [email or web address]. Sincerely, 
 
Tiffani Desai MD 
Chief Medical Officer [employer] *:  certain medications cannot be prescribed via [employer] Providers Seen During Your Hospitalization Provider Specialty Primary office phone Mamadou Gudino MD Gastroenterology [phone] Your Primary Care Physician (PCP) Primary Care Physician Office Phone Office Fax Nancie Vazquez [phone] You are allergic to the following No active allergies Recent Documentation Height Weight BMI Smoking Status 1.829 m 85.7 kg 25.63 kg/m2 Never Smoker Emergency Contacts Name Discharge Info Relation Home Work Mobile Chaz Baeza CAREGIVER [3] Spouse [3] [phone] Patient Belongings The following personal items are in your possession at time of discharge: 
  Dental Appliances: None  Visual Aid: Glasses, With patient Please provide this summary of care documentation to your next provider. Signatures-by signing, you are acknowledging that this After Visit Summary has been reviewed with you and you have received a copy. Patient Signature:  ____________________________________________________________ Date:  ____________________________________________________________  
  
[facility] Provider Signature:  ____________________________________________________________ Date:  ____________________________________________________________

## 2018-06-15 NOTE — H&P
Kendall De MD  [facility], [address]  [phone]  [email or web address]    Gastroenterology Outpatient History and Physical    Patient: Maggie Margo    Physician: Ada Bell MD    Vital Signs: Blood pressure 118/80, pulse 63, temperature 97.7 °F (36.5 °C), resp. rate 17, height 6' (1.829 m), weight 85.7 kg (189 lb), SpO2 98 %. Allergies: No Known Allergies    Chief Complaint: Screening colonoscopy    History of Present Illness: Here for a screening colonoscopy. Last colonoscopy was 2015 in [place] for history of colonic polyps and FH of colon cancer in his father and showed no polyps. Currently has no GI symptoms.      History:  Past Medical History:   Diagnosis Date    Asthma, mild intermittent     Family history of prostate cancer     Grandfather    Intermittent atrial fibrillation ([facility] [address] 1/16/2017    Left hydrocele     Personal history of colonic polyps     Negative colonoscopy 2015    Vitamin D deficiency       Past Surgical History:   Procedure Laterality Date    HX ARTHROPLASTY Right     HX COLONOSCOPY  06/11/2015    HX ORTHOPAEDIC      left knee surgery      Social History     Social History    Marital status:      Spouse name: N/A    Number of children: N/A    Years of education: N/A     Social History Main Topics    Smoking status: Never Smoker    Smokeless tobacco: Never Used    Alcohol use 1.2 oz/week     2 Glasses of wine per week      Comment: 2 glasses aaron daily    Drug use: No    Sexual activity: Not Asked     Other Topics Concern    None     Social History Narrative      Family History   Problem Relation Age of Onset    Cancer Mother      breast    Cancer Father      colon      Patient Active Problem List   Diagnosis Code    Intermittent atrial fibrillation (HCC) I48.0    Vitamin D deficiency E55.9    Personal history of colonic polyps Z86.010       Medications:   Prior to Admission medications    Medication Sig Start Date End Date Taking? Authorizing Provider   metoprolol succinate (TOPROL-XL) 100 mg tablet 1/2 to 1 tab daily as directed  Patient taking differently: 50 mg daily. 1/2 to 1 tab daily as directed 3/8/18  Yes Ahsan Romo MD   cholecalciferol (VITAMIN D3) 1,000 unit tablet Take 2 Tabs by mouth daily. 5/10/17  Yes Ahsan Romo MD   aspirin delayed-release 81 mg tablet Take 1 Tab by mouth daily. 5/10/17  Yes Ahsan Romo MD   flecainide (TAMBOCOR) 150 mg tablet 2 tabs prn as directed for afib 6/26/17   Ahsan Romo MD   albuterol (PROVENTIL HFA, VENTOLIN HFA, PROAIR HFA) 90 mcg/actuation inhaler Take 1 Puff by inhalation every six (6) hours as needed for Wheezing. Indications: BRONCHOSPASM PREVENTION 5/10/17   Ahsan Romo MD       Physical Exam:     General: well developed, well nourished   HEENT: unremarkable   Heart: regular rhythm no mumur    Lungs: clear   Abdominal:  benign   Neurological: unremarkable   Extremities: no edema     Findings/Diagnosis: Screening colonoscopy. Hx of polyps. FH of colon cancer.     Plan of Care/Planned Procedure: Colonoscopy with monitored anesthesia care sedation    Signed:  Precious Hwang MD 6/15/2018

## 2018-06-15 NOTE — ANESTHESIA PREPROCEDURE EVALUATION
Anesthetic History   No history of anesthetic complications            Review of Systems / Medical History  Patient summary reviewed, nursing notes reviewed and pertinent labs reviewed    Pulmonary  Within defined limits          Asthma        Neuro/Psych   Within defined limits           Cardiovascular  Within defined limits          Dysrhythmias : atrial fibrillation      Exercise tolerance: >4 METS  Comments: Hx Atrial fibrillation   GI/Hepatic/Renal  Within defined limits              Endo/Other  Within defined limits           Other Findings   Comments: Hx Colon Polyps  Family Physician           Physical Exam    Airway  Mallampati: II  TM Distance: 4 - 6 cm  Neck ROM: normal range of motion   Mouth opening: Normal     Cardiovascular  Regular rate and rhythm,  S1 and S2 normal,  no murmur, click, rub, or gallop             Dental  No notable dental hx       Pulmonary  Breath sounds clear to auscultation               Abdominal  GI exam deferred       Other Findings            Anesthetic Plan    ASA: 2  Anesthesia type: general and total IV anesthesia          Induction: Intravenous  Anesthetic plan and risks discussed with: Patient      Propofol MAC

## 2018-07-14 DIAGNOSIS — I48.0 INTERMITTENT ATRIAL FIBRILLATION (HCC): ICD-10-CM

## 2018-07-14 RX ORDER — METOPROLOL SUCCINATE 100 MG/1
TABLET, EXTENDED RELEASE ORAL
Qty: 90 TAB | Refills: 1 | Status: SHIPPED | OUTPATIENT
Start: 2018-07-14 | End: 2019-04-19 | Stop reason: SDUPTHER

## 2018-11-16 ENCOUNTER — TELEPHONE (OUTPATIENT)
Dept: FAMILY MEDICINE CLINIC | Age: 58
End: 2018-11-16

## 2018-11-16 NOTE — TELEPHONE ENCOUNTER
Dr. Estrellita Seaman office returning a missed call to [address] [place] [address]. Dr. Adithya Ham won't be in until Tuesday, Nov. 20 and the pt will be coming in on that same Tuesday at 8:30am when the physician returns.    Dr. Estrellita Seaman office contact:   [phone]

## 2018-11-27 ENCOUNTER — HOSPITAL ENCOUNTER (OUTPATIENT)
Dept: PREADMISSION TESTING | Age: 58
Discharge: HOME | End: 2018-11-27
Attending: NEUROLOGICAL SURGERY
Payer: COMMERCIAL

## 2018-11-27 VITALS
SYSTOLIC BLOOD PRESSURE: 128 MMHG | HEIGHT: 72 IN | TEMPERATURE: 98.5 F | HEART RATE: 56 BPM | OXYGEN SATURATION: 98 % | BODY MASS INDEX: 27.14 KG/M2 | RESPIRATION RATE: 16 BRPM | WEIGHT: 200.4 LBS | DIASTOLIC BLOOD PRESSURE: 79 MMHG

## 2018-11-27 LAB
ABO + RH BLD: NORMAL
ALBUMIN SERPL-MCNC: 3.5 G/DL (ref 3.5–5)
ALBUMIN/GLOB SERPL: 1 {RATIO} (ref 1.1–2.2)
ALP SERPL-CCNC: 51 U/L (ref 45–117)
ALT SERPL-CCNC: 30 U/L (ref 12–78)
ANION GAP SERPL CALC-SCNC: 3 MMOL/L (ref 5–15)
APPEARANCE UR: CLEAR
APTT PPP: 25.9 SEC (ref 22.1–32)
AST SERPL-CCNC: 21 U/L (ref 15–37)
ATRIAL RATE: 57 BPM
BACTERIA URNS QL MICRO: NEGATIVE /HPF
BASOPHILS # BLD: 0.1 K/UL (ref 0–0.1)
BASOPHILS NFR BLD: 1 % (ref 0–1)
BILIRUB SERPL-MCNC: 0.6 MG/DL (ref 0.2–1)
BILIRUB UR QL: NEGATIVE
BLOOD GROUP ANTIBODIES SERPL: NORMAL
BUN SERPL-MCNC: 20 MG/DL (ref 6–20)
BUN/CREAT SERPL: 19 (ref 12–20)
CALCIUM SERPL-MCNC: 8.7 MG/DL (ref 8.5–10.1)
CALCULATED P AXIS, ECG09: 75 DEGREES
CALCULATED R AXIS, ECG10: -64 DEGREES
CALCULATED T AXIS, ECG11: 30 DEGREES
CHLORIDE SERPL-SCNC: 105 MMOL/L (ref 97–108)
CO2 SERPL-SCNC: 31 MMOL/L (ref 21–32)
COLOR UR: NORMAL
CREAT SERPL-MCNC: 1.05 MG/DL (ref 0.7–1.3)
DIAGNOSIS, 93000: NORMAL
DIFFERENTIAL METHOD BLD: NORMAL
EOSINOPHIL # BLD: 0.2 K/UL (ref 0–0.4)
EOSINOPHIL NFR BLD: 2 % (ref 0–7)
EPITH CASTS URNS QL MICRO: NORMAL /LPF
ERYTHROCYTE [DISTWIDTH] IN BLOOD BY AUTOMATED COUNT: 11.9 % (ref 11.5–14.5)
EST. AVERAGE GLUCOSE BLD GHB EST-MCNC: 117 MG/DL
GLOBULIN SER CALC-MCNC: 3.4 G/DL (ref 2–4)
GLUCOSE SERPL-MCNC: 105 MG/DL (ref 65–100)
GLUCOSE UR STRIP.AUTO-MCNC: NEGATIVE MG/DL
HBA1C MFR BLD: 5.7 % (ref 4.2–6.3)
HCT VFR BLD AUTO: 43.8 % (ref 36.6–50.3)
HGB BLD-MCNC: 15.1 G/DL (ref 12.1–17)
HGB UR QL STRIP: NEGATIVE
HYALINE CASTS URNS QL MICRO: NORMAL /LPF (ref 0–5)
IMM GRANULOCYTES # BLD: 0 K/UL (ref 0–0.04)
IMM GRANULOCYTES NFR BLD AUTO: 0 % (ref 0–0.5)
INR PPP: 1 (ref 0.9–1.1)
KETONES UR QL STRIP.AUTO: NEGATIVE MG/DL
LEUKOCYTE ESTERASE UR QL STRIP.AUTO: NEGATIVE
LYMPHOCYTES # BLD: 2.1 K/UL (ref 0.8–3.5)
LYMPHOCYTES NFR BLD: 31 % (ref 12–49)
MCH RBC QN AUTO: 32.2 PG (ref 26–34)
MCHC RBC AUTO-ENTMCNC: 34.5 G/DL (ref 30–36.5)
MCV RBC AUTO: 93.4 FL (ref 80–99)
MONOCYTES # BLD: 0.7 K/UL (ref 0–1)
MONOCYTES NFR BLD: 10 % (ref 5–13)
NEUTS SEG # BLD: 3.8 K/UL (ref 1.8–8)
NEUTS SEG NFR BLD: 55 % (ref 32–75)
NITRITE UR QL STRIP.AUTO: NEGATIVE
NRBC # BLD: 0 K/UL (ref 0–0.01)
NRBC BLD-RTO: 0 PER 100 WBC
P-R INTERVAL, ECG05: 174 MS
PH UR STRIP: 5.5 [PH] (ref 5–8)
PLATELET # BLD AUTO: 171 K/UL (ref 150–400)
PMV BLD AUTO: 9.6 FL (ref 8.9–12.9)
POTASSIUM SERPL-SCNC: 4.3 MMOL/L (ref 3.5–5.1)
PROT SERPL-MCNC: 6.9 G/DL (ref 6.4–8.2)
PROT UR STRIP-MCNC: NEGATIVE MG/DL
PROTHROMBIN TIME: 10.2 SEC (ref 9–11.1)
Q-T INTERVAL, ECG07: 424 MS
QRS DURATION, ECG06: 114 MS
QTC CALCULATION (BEZET), ECG08: 412 MS
RBC # BLD AUTO: 4.69 M/UL (ref 4.1–5.7)
RBC #/AREA URNS HPF: NORMAL /HPF (ref 0–5)
SODIUM SERPL-SCNC: 139 MMOL/L (ref 136–145)
SP GR UR REFRACTOMETRY: 1.02 (ref 1–1.03)
SPECIMEN EXP DATE BLD: NORMAL
THERAPEUTIC RANGE,PTTT: NORMAL SECS (ref 58–77)
UA: UC IF INDICATED,UAUC: NORMAL
UROBILINOGEN UR QL STRIP.AUTO: 0.2 EU/DL (ref 0.2–1)
VENTRICULAR RATE, ECG03: 57 BPM
WBC # BLD AUTO: 6.9 K/UL (ref 4.1–11.1)
WBC URNS QL MICRO: NORMAL /HPF (ref 0–4)

## 2018-11-27 PROCEDURE — 85025 COMPLETE CBC W/AUTO DIFF WBC: CPT

## 2018-11-27 PROCEDURE — 81001 URINALYSIS AUTO W/SCOPE: CPT

## 2018-11-27 PROCEDURE — 85610 PROTHROMBIN TIME: CPT

## 2018-11-27 PROCEDURE — 93005 ELECTROCARDIOGRAM TRACING: CPT

## 2018-11-27 PROCEDURE — 83036 HEMOGLOBIN GLYCOSYLATED A1C: CPT

## 2018-11-27 PROCEDURE — 36415 COLL VENOUS BLD VENIPUNCTURE: CPT

## 2018-11-27 PROCEDURE — 86901 BLOOD TYPING SEROLOGIC RH(D): CPT

## 2018-11-27 PROCEDURE — 80053 COMPREHEN METABOLIC PANEL: CPT

## 2018-11-27 PROCEDURE — 85730 THROMBOPLASTIN TIME PARTIAL: CPT

## 2018-11-27 RX ORDER — SODIUM CHLORIDE, SODIUM LACTATE, POTASSIUM CHLORIDE, CALCIUM CHLORIDE 600; 310; 30; 20 MG/100ML; MG/100ML; MG/100ML; MG/100ML
25 INJECTION, SOLUTION INTRAVENOUS CONTINUOUS
Status: CANCELLED | OUTPATIENT
Start: 2018-11-28

## 2018-11-27 RX ORDER — ASPIRIN 81 MG/1
81 TABLET ORAL DAILY
Status: ON HOLD | COMMUNITY
End: 2018-11-29 | Stop reason: SDUPTHER

## 2018-11-27 RX ORDER — CEFAZOLIN SODIUM 1 G/3ML
2 INJECTION, POWDER, FOR SOLUTION INTRAMUSCULAR; INTRAVENOUS ONCE
Status: CANCELLED | OUTPATIENT
Start: 2018-11-28 | End: 2018-11-28

## 2018-11-27 NOTE — PERIOP NOTES
[facility]  Preoperative Instructions        Surgery Date 11/28/18          Time of Arrival 0915    1. On the day of your surgery, please report to the Surgical Services Registration Desk and sign in at your designated time. The Surgery Center is located to the right of the Emergency Room. 2. You must have someone with you to drive you home. You should not drive a car for 24 hours following surgery. Please make arrangements for a friend or family member to stay with you for the first 24 hours after your surgery. 3. Do not have anything to eat or drink (including water, gum, mints, coffee, juice) after midnight ?? 11/27/18     . ? This may not apply to medications prescribed by your physician. ?(Please note below the special instructions with medications to take the morning of your procedure.)    4. We recommend you do not drink any alcoholic beverages for 24 hours before and after your surgery. 5. Contact your surgeons office for instructions on the following medications: non-steroidal anti-inflammatory drugs (i.e. Advil, Aleve), vitamins, and supplements. (Some surgeons will want you to stop these medications prior to surgery and others may allow you to take them)  **If you are currently taking Plavix, Coumadin, Aspirin and/or other blood-thinning agents, contact your surgeon for instructions. ** Your surgeon will partner with the physician prescribing these medications to determine if it is safe to stop or if you need to continue taking. Please do not stop taking these medications without instructions from your surgeon    6. Wear comfortable clothes. Wear glasses instead of contacts. Do not bring any money or jewelry. Please bring picture ID, insurance card, and any prearranged co-payment or hospital payment. Do not wear make-up, particularly mascara the morning of your surgery. Do not wear nail polish, particularly if you are having foot /hand surgery.   Wear your hair loose or down, no ponytails, buns, khai pins or clips. All body piercings must be removed. Please shower with antibacterial soap for three consecutive days before and on the morning of surgery, but do not apply any lotions, powders or deodorants after the shower on the day of surgery. Please use a fresh towels after each shower. Please sleep in clean clothes and change bed linens the night before surgery. Please do not shave for 48 hours prior to surgery. Shaving of the face is acceptable. 7. You should understand that if you do not follow these instructions your surgery may be cancelled. If your physical condition changes (I.e. fever, cold or flu) please contact your surgeon as soon as possible. 8. It is important that you be on time. If a situation occurs where you may be late, please call [phone] (OR Holding Area). 9. If you have any questions and or problems, please call [phone] (Pre-admission Testing). 10. Your surgery time may be subject to change. You will receive a phone call the evening prior if your time changes. 11.  If having outpatient surgery, you must have someone to drive you here, stay with you during the duration of your stay, and to drive you home at time of discharge. 12.   In an effort to improve the efficiency, privacy, and safety for all of our Pre-op patients visitors are not allowed in the Holding area. Once you arrive and are registered your family/visitors will be asked to remain in the waiting room. The Pre-op staff will get you from the Surgical Waiting Area and will explain to you and your family/visitors that the Pre-op phase is beginning. The staff will answer any questions and provide instructions for tracking of the patient, by use of the existing tracking number and color-coded status board in the waiting room.   At this time the staff will also ask for your designated spokesperson information in the event that the physician or staff need to provide an update or obtain any pertinent information. The designated spokesperson will be notified if the physician needs to speak to family during the pre-operative phase. If at any time your family/visitors has questions or concerns they may approach the volunteer desk in the waiting area for assistance. Special Instructions:    MEDICATIONS TO TAKE THE MORNING OF SURGERY WITH A SIP OF WATER: metoprolol      I understand a pre-operative phone call will be made to verify my surgery time. In the event that I am not available, I give permission for a message to be left on my answering service and/or with another person?   Yes [phone]         ___________________      __________   _________    (Signature of Patient)             (Witness)                (Date and Time)

## 2018-11-27 NOTE — ADVANCED PRACTICE NURSE
EKG from 11/27/18 reviewed by Dr. Pedro Vale, anesthesiologist.  Planned procedure, PMHx, previous cardiac evaluations, functional status reviewed. OK to proceed with planned surgery per Dr. Pedro Vale.

## 2018-11-27 NOTE — ADVANCED PRACTICE NURSE
Urine and nasal cx results not available prior to surgery. PAT appt 11/27/18. DOS 11/28/18.  Orthopedic IP NP team notified and will follow results and tx as indicated while in hospital.

## 2018-11-28 ENCOUNTER — HOSPITAL ENCOUNTER (OUTPATIENT)
Age: 58
Setting detail: OBSERVATION
Discharge: HOME | End: 2018-11-29
Attending: NEUROLOGICAL SURGERY | Admitting: NEUROLOGICAL SURGERY
Payer: COMMERCIAL

## 2018-11-28 ENCOUNTER — APPOINTMENT (OUTPATIENT)
Dept: GENERAL RADIOLOGY | Age: 58
End: 2018-11-28
Attending: NEUROLOGICAL SURGERY
Payer: COMMERCIAL

## 2018-11-28 ENCOUNTER — ANESTHESIA EVENT (OUTPATIENT)
Dept: SURGERY | Age: 58
End: 2018-11-28
Payer: COMMERCIAL

## 2018-11-28 ENCOUNTER — ANESTHESIA (OUTPATIENT)
Dept: SURGERY | Age: 58
End: 2018-11-28
Payer: COMMERCIAL

## 2018-11-28 PROBLEM — M51.26 HERNIATED LUMBAR INTERVERTEBRAL DISC: Status: ACTIVE | Noted: 2018-11-28

## 2018-11-28 PROBLEM — Z98.890 S/P LUMBAR LAMINECTOMY: Status: ACTIVE | Noted: 2018-11-28

## 2018-11-28 LAB
BACTERIA SPEC CULT: NORMAL
BACTERIA SPEC CULT: NORMAL
SERVICE CMNT-IMP: NORMAL

## 2018-11-28 PROCEDURE — 76001 XR FLUOROSCOPY OVER 60 MINUTES: CPT

## 2018-11-28 PROCEDURE — 77030014650 HC SEAL MTRX FLOSEL BAXT -C: Performed by: NEUROLOGICAL SURGERY

## 2018-11-28 PROCEDURE — 74011250636 HC RX REV CODE- 250/636: Performed by: NEUROLOGICAL SURGERY

## 2018-11-28 PROCEDURE — 76010000171 HC OR TIME 2 TO 2.5 HR INTENSV-TIER 1: Performed by: NEUROLOGICAL SURGERY

## 2018-11-28 PROCEDURE — 77030029099 HC BN WAX SSPC -A: Performed by: NEUROLOGICAL SURGERY

## 2018-11-28 PROCEDURE — 99218 HC RM OBSERVATION: CPT

## 2018-11-28 PROCEDURE — 77030002933 HC SUT MCRYL J&J -A: Performed by: NEUROLOGICAL SURGERY

## 2018-11-28 PROCEDURE — 77030026438 HC STYL ET INTUB CARD -A: Performed by: ANESTHESIOLOGY

## 2018-11-28 PROCEDURE — 74011000250 HC RX REV CODE- 250

## 2018-11-28 PROCEDURE — 88304 TISSUE EXAM BY PATHOLOGIST: CPT

## 2018-11-28 PROCEDURE — 77030020782 HC GWN BAIR PAWS FLX 3M -B

## 2018-11-28 PROCEDURE — 72020 X-RAY EXAM OF SPINE 1 VIEW: CPT

## 2018-11-28 PROCEDURE — 74011250636 HC RX REV CODE- 250/636

## 2018-11-28 PROCEDURE — 77030018836 HC SOL IRR NACL ICUM -A: Performed by: NEUROLOGICAL SURGERY

## 2018-11-28 PROCEDURE — 74011250636 HC RX REV CODE- 250/636: Performed by: ANESTHESIOLOGY

## 2018-11-28 PROCEDURE — 77030004391 HC BUR FLUT MEDT -C: Performed by: NEUROLOGICAL SURGERY

## 2018-11-28 PROCEDURE — 74011000272 HC RX REV CODE- 272: Performed by: NEUROLOGICAL SURGERY

## 2018-11-28 PROCEDURE — 74011000250 HC RX REV CODE- 250: Performed by: NEUROLOGICAL SURGERY

## 2018-11-28 PROCEDURE — 76210000002 HC OR PH I REC 3 TO 3.5 HR: Performed by: NEUROLOGICAL SURGERY

## 2018-11-28 PROCEDURE — 77030013079 HC BLNKT BAIR HGGR 3M -A: Performed by: ANESTHESIOLOGY

## 2018-11-28 PROCEDURE — 76060000035 HC ANESTHESIA 2 TO 2.5 HR: Performed by: NEUROLOGICAL SURGERY

## 2018-11-28 PROCEDURE — 77030003029 HC SUT VCRL J&J -B: Performed by: NEUROLOGICAL SURGERY

## 2018-11-28 PROCEDURE — 77030003028 HC SUT VCRL J&J -A: Performed by: NEUROLOGICAL SURGERY

## 2018-11-28 PROCEDURE — 74011250637 HC RX REV CODE- 250/637: Performed by: NEUROLOGICAL SURGERY

## 2018-11-28 PROCEDURE — 77030008684 HC TU ET CUF COVD -B: Performed by: ANESTHESIOLOGY

## 2018-11-28 PROCEDURE — 77030013474 HC CRD BPLR DISP ADLR -A: Performed by: NEUROLOGICAL SURGERY

## 2018-11-28 PROCEDURE — 77030032490 HC SLV COMPR SCD KNE COVD -B: Performed by: NEUROLOGICAL SURGERY

## 2018-11-28 RX ORDER — FENTANYL CITRATE 50 UG/ML
INJECTION, SOLUTION INTRAMUSCULAR; INTRAVENOUS AS NEEDED
Status: DISCONTINUED | OUTPATIENT
Start: 2018-11-28 | End: 2018-11-28 | Stop reason: HOSPADM

## 2018-11-28 RX ORDER — SODIUM CHLORIDE 0.9 % (FLUSH) 0.9 %
5-10 SYRINGE (ML) INJECTION AS NEEDED
Status: DISCONTINUED | OUTPATIENT
Start: 2018-11-28 | End: 2018-11-28 | Stop reason: HOSPADM

## 2018-11-28 RX ORDER — SUCCINYLCHOLINE CHLORIDE 20 MG/ML
INJECTION INTRAMUSCULAR; INTRAVENOUS AS NEEDED
Status: DISCONTINUED | OUTPATIENT
Start: 2018-11-28 | End: 2018-11-28 | Stop reason: HOSPADM

## 2018-11-28 RX ORDER — MIDAZOLAM HYDROCHLORIDE 1 MG/ML
INJECTION, SOLUTION INTRAMUSCULAR; INTRAVENOUS AS NEEDED
Status: DISCONTINUED | OUTPATIENT
Start: 2018-11-28 | End: 2018-11-28 | Stop reason: HOSPADM

## 2018-11-28 RX ORDER — FENTANYL CITRATE 50 UG/ML
25 INJECTION, SOLUTION INTRAMUSCULAR; INTRAVENOUS
Status: DISCONTINUED | OUTPATIENT
Start: 2018-11-28 | End: 2018-11-28 | Stop reason: HOSPADM

## 2018-11-28 RX ORDER — ALBUTEROL SULFATE 90 UG/1
1 AEROSOL, METERED RESPIRATORY (INHALATION)
Status: DISCONTINUED | OUTPATIENT
Start: 2018-11-28 | End: 2018-11-29 | Stop reason: HOSPADM

## 2018-11-28 RX ORDER — SODIUM CHLORIDE 9 MG/ML
125 INJECTION, SOLUTION INTRAVENOUS CONTINUOUS
Status: DISPENSED | OUTPATIENT
Start: 2018-11-28 | End: 2018-11-29

## 2018-11-28 RX ORDER — DIPHENHYDRAMINE HYDROCHLORIDE 50 MG/ML
12.5 INJECTION, SOLUTION INTRAMUSCULAR; INTRAVENOUS AS NEEDED
Status: DISCONTINUED | OUTPATIENT
Start: 2018-11-28 | End: 2018-11-28 | Stop reason: HOSPADM

## 2018-11-28 RX ORDER — SODIUM CHLORIDE 0.9 % (FLUSH) 0.9 %
5-10 SYRINGE (ML) INJECTION EVERY 8 HOURS
Status: DISCONTINUED | OUTPATIENT
Start: 2018-11-29 | End: 2018-11-29 | Stop reason: HOSPADM

## 2018-11-28 RX ORDER — LIDOCAINE HYDROCHLORIDE 10 MG/ML
0.1 INJECTION, SOLUTION EPIDURAL; INFILTRATION; INTRACAUDAL; PERINEURAL AS NEEDED
Status: DISCONTINUED | OUTPATIENT
Start: 2018-11-28 | End: 2018-11-28 | Stop reason: HOSPADM

## 2018-11-28 RX ORDER — HYDROMORPHONE HYDROCHLORIDE 1 MG/ML
0.2 INJECTION, SOLUTION INTRAMUSCULAR; INTRAVENOUS; SUBCUTANEOUS
Status: DISCONTINUED | OUTPATIENT
Start: 2018-11-28 | End: 2018-11-28

## 2018-11-28 RX ORDER — FENTANYL CITRATE 50 UG/ML
INJECTION, SOLUTION INTRAMUSCULAR; INTRAVENOUS
Status: COMPLETED
Start: 2018-11-28 | End: 2018-11-28

## 2018-11-28 RX ORDER — SODIUM CHLORIDE 0.9 % (FLUSH) 0.9 %
5-10 SYRINGE (ML) INJECTION AS NEEDED
Status: DISCONTINUED | OUTPATIENT
Start: 2018-11-28 | End: 2018-11-29 | Stop reason: HOSPADM

## 2018-11-28 RX ORDER — HYDROMORPHONE HYDROCHLORIDE 1 MG/ML
0.2 INJECTION, SOLUTION INTRAMUSCULAR; INTRAVENOUS; SUBCUTANEOUS
Status: DISCONTINUED | OUTPATIENT
Start: 2018-11-28 | End: 2018-11-29 | Stop reason: HOSPADM

## 2018-11-28 RX ORDER — METOPROLOL SUCCINATE 50 MG/1
50 TABLET, EXTENDED RELEASE ORAL DAILY
Status: DISCONTINUED | OUTPATIENT
Start: 2018-11-29 | End: 2018-11-29 | Stop reason: HOSPADM

## 2018-11-28 RX ORDER — OXYCODONE HYDROCHLORIDE 5 MG/1
10 TABLET ORAL
Status: DISCONTINUED | OUTPATIENT
Start: 2018-11-28 | End: 2018-11-29 | Stop reason: HOSPADM

## 2018-11-28 RX ORDER — ONDANSETRON 2 MG/ML
INJECTION INTRAMUSCULAR; INTRAVENOUS AS NEEDED
Status: DISCONTINUED | OUTPATIENT
Start: 2018-11-28 | End: 2018-11-28 | Stop reason: HOSPADM

## 2018-11-28 RX ORDER — ONDANSETRON 4 MG/1
4 TABLET, ORALLY DISINTEGRATING ORAL
Status: DISCONTINUED | OUTPATIENT
Start: 2018-11-28 | End: 2018-11-29 | Stop reason: HOSPADM

## 2018-11-28 RX ORDER — PROPOFOL 10 MG/ML
INJECTION, EMULSION INTRAVENOUS AS NEEDED
Status: DISCONTINUED | OUTPATIENT
Start: 2018-11-28 | End: 2018-11-28 | Stop reason: HOSPADM

## 2018-11-28 RX ORDER — SODIUM CHLORIDE, SODIUM LACTATE, POTASSIUM CHLORIDE, CALCIUM CHLORIDE 600; 310; 30; 20 MG/100ML; MG/100ML; MG/100ML; MG/100ML
25 INJECTION, SOLUTION INTRAVENOUS CONTINUOUS
Status: DISCONTINUED | OUTPATIENT
Start: 2018-11-28 | End: 2018-11-29 | Stop reason: HOSPADM

## 2018-11-28 RX ORDER — CEFAZOLIN SODIUM 1 G/3ML
INJECTION, POWDER, FOR SOLUTION INTRAMUSCULAR; INTRAVENOUS AS NEEDED
Status: DISCONTINUED | OUTPATIENT
Start: 2018-11-28 | End: 2018-11-28 | Stop reason: HOSPADM

## 2018-11-28 RX ORDER — BUPIVACAINE HYDROCHLORIDE AND EPINEPHRINE 5; 5 MG/ML; UG/ML
INJECTION, SOLUTION EPIDURAL; INTRACAUDAL; PERINEURAL AS NEEDED
Status: DISCONTINUED | OUTPATIENT
Start: 2018-11-28 | End: 2018-11-28 | Stop reason: HOSPADM

## 2018-11-28 RX ORDER — METHYLPREDNISOLONE ACETATE 40 MG/ML
INJECTION, SUSPENSION INTRA-ARTICULAR; INTRALESIONAL; INTRAMUSCULAR; SOFT TISSUE AS NEEDED
Status: DISCONTINUED | OUTPATIENT
Start: 2018-11-28 | End: 2018-11-28 | Stop reason: HOSPADM

## 2018-11-28 RX ORDER — AMOXICILLIN 250 MG
1 CAPSULE ORAL DAILY
Status: DISCONTINUED | OUTPATIENT
Start: 2018-11-29 | End: 2018-11-29 | Stop reason: HOSPADM

## 2018-11-28 RX ORDER — ACETAMINOPHEN 325 MG/1
650 TABLET ORAL EVERY 6 HOURS
Status: DISCONTINUED | OUTPATIENT
Start: 2018-11-28 | End: 2018-11-29 | Stop reason: HOSPADM

## 2018-11-28 RX ORDER — NALOXONE HYDROCHLORIDE 0.4 MG/ML
0.4 INJECTION, SOLUTION INTRAMUSCULAR; INTRAVENOUS; SUBCUTANEOUS AS NEEDED
Status: DISCONTINUED | OUTPATIENT
Start: 2018-11-28 | End: 2018-11-29 | Stop reason: HOSPADM

## 2018-11-28 RX ORDER — OXYCODONE HYDROCHLORIDE 5 MG/1
5 TABLET ORAL
Status: DISCONTINUED | OUTPATIENT
Start: 2018-11-28 | End: 2018-11-29 | Stop reason: HOSPADM

## 2018-11-28 RX ORDER — GLYCOPYRROLATE 0.2 MG/ML
INJECTION INTRAMUSCULAR; INTRAVENOUS AS NEEDED
Status: DISCONTINUED | OUTPATIENT
Start: 2018-11-28 | End: 2018-11-28 | Stop reason: HOSPADM

## 2018-11-28 RX ORDER — ROCURONIUM BROMIDE 10 MG/ML
INJECTION, SOLUTION INTRAVENOUS AS NEEDED
Status: DISCONTINUED | OUTPATIENT
Start: 2018-11-28 | End: 2018-11-28 | Stop reason: HOSPADM

## 2018-11-28 RX ORDER — FACIAL-BODY WIPES
10 EACH TOPICAL DAILY PRN
Status: DISCONTINUED | OUTPATIENT
Start: 2018-11-28 | End: 2018-11-29 | Stop reason: HOSPADM

## 2018-11-28 RX ORDER — LIDOCAINE HYDROCHLORIDE 20 MG/ML
INJECTION, SOLUTION EPIDURAL; INFILTRATION; INTRACAUDAL; PERINEURAL AS NEEDED
Status: DISCONTINUED | OUTPATIENT
Start: 2018-11-28 | End: 2018-11-28 | Stop reason: HOSPADM

## 2018-11-28 RX ORDER — HYDROMORPHONE HYDROCHLORIDE 2 MG/ML
INJECTION, SOLUTION INTRAMUSCULAR; INTRAVENOUS; SUBCUTANEOUS AS NEEDED
Status: DISCONTINUED | OUTPATIENT
Start: 2018-11-28 | End: 2018-11-28 | Stop reason: HOSPADM

## 2018-11-28 RX ORDER — MORPHINE SULFATE 10 MG/ML
2 INJECTION, SOLUTION INTRAMUSCULAR; INTRAVENOUS
Status: DISCONTINUED | OUTPATIENT
Start: 2018-11-28 | End: 2018-11-29 | Stop reason: HOSPADM

## 2018-11-28 RX ORDER — DEXAMETHASONE SODIUM PHOSPHATE 100 MG/10ML
INJECTION INTRAMUSCULAR; INTRAVENOUS AS NEEDED
Status: DISCONTINUED | OUTPATIENT
Start: 2018-11-28 | End: 2018-11-28 | Stop reason: HOSPADM

## 2018-11-28 RX ORDER — ACETAMINOPHEN 10 MG/ML
INJECTION, SOLUTION INTRAVENOUS AS NEEDED
Status: DISCONTINUED | OUTPATIENT
Start: 2018-11-28 | End: 2018-11-28 | Stop reason: HOSPADM

## 2018-11-28 RX ORDER — SODIUM CHLORIDE 0.9 % (FLUSH) 0.9 %
5-10 SYRINGE (ML) INJECTION EVERY 8 HOURS
Status: DISCONTINUED | OUTPATIENT
Start: 2018-11-28 | End: 2018-11-28 | Stop reason: HOSPADM

## 2018-11-28 RX ORDER — CEFAZOLIN SODIUM 1 G/3ML
2 INJECTION, POWDER, FOR SOLUTION INTRAMUSCULAR; INTRAVENOUS ONCE
Status: DISCONTINUED | OUTPATIENT
Start: 2018-11-28 | End: 2018-11-28 | Stop reason: HOSPADM

## 2018-11-28 RX ORDER — SODIUM CHLORIDE, SODIUM LACTATE, POTASSIUM CHLORIDE, CALCIUM CHLORIDE 600; 310; 30; 20 MG/100ML; MG/100ML; MG/100ML; MG/100ML
INJECTION, SOLUTION INTRAVENOUS
Status: DISCONTINUED | OUTPATIENT
Start: 2018-11-28 | End: 2018-11-28 | Stop reason: HOSPADM

## 2018-11-28 RX ORDER — POLYETHYLENE GLYCOL 3350 17 G/17G
17 POWDER, FOR SOLUTION ORAL DAILY
Status: DISCONTINUED | OUTPATIENT
Start: 2018-11-29 | End: 2018-11-29 | Stop reason: HOSPADM

## 2018-11-28 RX ORDER — CEFAZOLIN SODIUM/WATER 2 G/20 ML
2 SYRINGE (ML) INTRAVENOUS EVERY 8 HOURS
Status: COMPLETED | OUTPATIENT
Start: 2018-11-28 | End: 2018-11-29

## 2018-11-28 RX ORDER — SODIUM CHLORIDE, SODIUM LACTATE, POTASSIUM CHLORIDE, CALCIUM CHLORIDE 600; 310; 30; 20 MG/100ML; MG/100ML; MG/100ML; MG/100ML
25 INJECTION, SOLUTION INTRAVENOUS CONTINUOUS
Status: DISCONTINUED | OUTPATIENT
Start: 2018-11-28 | End: 2018-11-28 | Stop reason: HOSPADM

## 2018-11-28 RX ADMIN — SODIUM CHLORIDE, SODIUM LACTATE, POTASSIUM CHLORIDE, CALCIUM CHLORIDE: 600; 310; 30; 20 INJECTION, SOLUTION INTRAVENOUS at 12:38

## 2018-11-28 RX ADMIN — FENTANYL CITRATE 100 MCG: 50 INJECTION, SOLUTION INTRAMUSCULAR; INTRAVENOUS at 11:14

## 2018-11-28 RX ADMIN — CEFAZOLIN SODIUM 2 G: 1 INJECTION, POWDER, FOR SOLUTION INTRAMUSCULAR; INTRAVENOUS at 11:17

## 2018-11-28 RX ADMIN — ROCURONIUM BROMIDE 10 MG: 10 INJECTION, SOLUTION INTRAVENOUS at 12:21

## 2018-11-28 RX ADMIN — PROPOFOL 150 MG: 10 INJECTION, EMULSION INTRAVENOUS at 11:14

## 2018-11-28 RX ADMIN — SUCCINYLCHOLINE CHLORIDE 160 MG: 20 INJECTION INTRAMUSCULAR; INTRAVENOUS at 11:14

## 2018-11-28 RX ADMIN — FENTANYL CITRATE 25 MCG: 50 INJECTION, SOLUTION INTRAMUSCULAR; INTRAVENOUS at 15:03

## 2018-11-28 RX ADMIN — ROCURONIUM BROMIDE 30 MG: 10 INJECTION, SOLUTION INTRAVENOUS at 11:27

## 2018-11-28 RX ADMIN — HYDROMORPHONE HYDROCHLORIDE 0.4 MG: 2 INJECTION, SOLUTION INTRAMUSCULAR; INTRAVENOUS; SUBCUTANEOUS at 11:14

## 2018-11-28 RX ADMIN — DEXAMETHASONE SODIUM PHOSPHATE 10 MG: 100 INJECTION INTRAMUSCULAR; INTRAVENOUS at 11:17

## 2018-11-28 RX ADMIN — ROCURONIUM BROMIDE 10 MG: 10 INJECTION, SOLUTION INTRAVENOUS at 11:14

## 2018-11-28 RX ADMIN — FENTANYL CITRATE 25 MCG: 50 INJECTION, SOLUTION INTRAMUSCULAR; INTRAVENOUS at 13:39

## 2018-11-28 RX ADMIN — SODIUM CHLORIDE, SODIUM LACTATE, POTASSIUM CHLORIDE, AND CALCIUM CHLORIDE 25 ML/HR: 600; 310; 30; 20 INJECTION, SOLUTION INTRAVENOUS at 10:45

## 2018-11-28 RX ADMIN — ACETAMINOPHEN 650 MG: 325 TABLET ORAL at 23:04

## 2018-11-28 RX ADMIN — ONDANSETRON 4 MG: 2 INJECTION INTRAMUSCULAR; INTRAVENOUS at 11:17

## 2018-11-28 RX ADMIN — Medication 2 G: at 18:58

## 2018-11-28 RX ADMIN — SODIUM CHLORIDE 125 ML/HR: 900 INJECTION, SOLUTION INTRAVENOUS at 13:38

## 2018-11-28 RX ADMIN — HYDROMORPHONE HYDROCHLORIDE 0.4 MG: 2 INJECTION, SOLUTION INTRAMUSCULAR; INTRAVENOUS; SUBCUTANEOUS at 13:13

## 2018-11-28 RX ADMIN — FENTANYL CITRATE 25 MCG: 50 INJECTION, SOLUTION INTRAMUSCULAR; INTRAVENOUS at 15:50

## 2018-11-28 RX ADMIN — LIDOCAINE HYDROCHLORIDE 20 MG: 20 INJECTION, SOLUTION EPIDURAL; INFILTRATION; INTRACAUDAL; PERINEURAL at 11:14

## 2018-11-28 RX ADMIN — FENTANYL CITRATE 25 MCG: 50 INJECTION, SOLUTION INTRAMUSCULAR; INTRAVENOUS at 15:54

## 2018-11-28 RX ADMIN — GLYCOPYRROLATE 0.1 MG: 0.2 INJECTION INTRAMUSCULAR; INTRAVENOUS at 11:58

## 2018-11-28 RX ADMIN — ACETAMINOPHEN 1000 MG: 10 INJECTION, SOLUTION INTRAVENOUS at 11:36

## 2018-11-28 RX ADMIN — HYDROMORPHONE HYDROCHLORIDE 0.4 MG: 2 INJECTION, SOLUTION INTRAMUSCULAR; INTRAVENOUS; SUBCUTANEOUS at 13:19

## 2018-11-28 RX ADMIN — MIDAZOLAM HYDROCHLORIDE 2 MG: 1 INJECTION, SOLUTION INTRAMUSCULAR; INTRAVENOUS at 11:07

## 2018-11-28 RX ADMIN — OXYCODONE HYDROCHLORIDE 5 MG: 5 TABLET ORAL at 23:04

## 2018-11-28 RX ADMIN — ACETAMINOPHEN 650 MG: 325 TABLET ORAL at 18:58

## 2018-11-28 RX ADMIN — SODIUM CHLORIDE, SODIUM LACTATE, POTASSIUM CHLORIDE, CALCIUM CHLORIDE: 600; 310; 30; 20 INJECTION, SOLUTION INTRAVENOUS at 11:00

## 2018-11-28 NOTE — ANESTHESIA POSTPROCEDURE EVALUATION
Procedure(s):  BILATERAL LUMBAR LAMINECTOMY AND DISCECTOMY L4-5. Anesthesia Post Evaluation        Patient location during evaluation: PACU  Note status: Adequate. Level of consciousness: responsive to verbal stimuli and sleepy but conscious  Pain management: satisfactory to patient  Airway patency: patent  Anesthetic complications: no  Cardiovascular status: acceptable  Respiratory status: acceptable  Hydration status: acceptable  Comments: +Post-Anesthesia Evaluation and Assessment    Patient: Prashant Ku MRN: [id number]  SSN: [id number]   YOB: 1960  Age: 59 y.o. Sex: male      Cardiovascular Function/Vital Signs    /71   Pulse 74   Temp 36.6 °C (97.8 °F)   Resp 11   Ht 6' (1.829 m)   Wt 90.2 kg (198 lb 13.7 oz)   SpO2 94%   BMI 26.97 kg/m²     Patient is status post Procedure(s):  BILATERAL LUMBAR LAMINECTOMY AND DISCECTOMY L4-5. Nausea/Vomiting: Controlled. Postoperative hydration reviewed and adequate. Pain:  Pain Scale 1: Numeric (0 - 10) (11/28/18 1345)  Pain Intensity 1: 2 (11/28/18 1345)   Managed. Neurological Status:   Neuro (WDL): Exceptions to WDL (11/28/18 1319)   At baseline. Mental Status and Level of Consciousness: Arousable. Pulmonary Status:   O2 Device: Nasal cannula (11/28/18 1345)   Adequate oxygenation and airway patent. Complications related to anesthesia: None    Post-anesthesia assessment completed. No concerns.     Signed By: Lisa Lewis MD    11/28/2018  Post anesthesia nausea and vomiting:  controlled      Visit Vitals  /71   Pulse 74   Temp 36.6 °C (97.8 °F)   Resp 11   Ht 6' (1.829 m)   Wt 90.2 kg (198 lb 13.7 oz)   SpO2 94%   BMI 26.97 kg/m²

## 2018-11-28 NOTE — PERIOP NOTES
Handoff Report from Operating Room to PACU    Report received from [address]  and [address] regarding Aria Rodriguez. Surgeon(s):  Rik Cannon MD  And Procedure(s) (LRB):  BILATERAL LUMBAR LAMINECTOMY AND DISCECTOMY L4-5 (N/A)  confirmed   with dressings discussed. Anesthesia type, drugs, patient history, complications, estimated blood loss, vital signs, intake and output, and last pain medication and reversal medications were reviewed.

## 2018-11-28 NOTE — BRIEF OP NOTE
BRIEF OPERATIVE NOTE    Date of Procedure: 11/28/2018   Preoperative Diagnosis: HERNIATED LUMBAR DISC BILATERAL  Postoperative Diagnosis: HERNIATED LUMBAR DISC BILATERAL    Procedure(s):  BILATERAL LUMBAR LAMINECTOMY AND DISCECTOMY L4-5  Surgeon(s) and Role:     * Dimitrios Don MD - Primary             Surgical Staff:  Circ-1: Isak Alfred  Circ-Relief: Homa Lam RN  Radiology Technician: Raina Ernst  Scrub Tech-1: Misael Clifton  Scrub Tech-Relief: Chin Rutherford  Surg Asst-1: Forrestine Yonny  Surg Asst-Relief: Elke Ibarra  Event Time In Time Out   Incision Start 1139    Incision Close 1257      Anesthesia: General   Estimated Blood Loss: minimal  Specimens:   ID Type Source Tests Collected by Time Destination   1 : L4-5 Cyst Preservative Lumbar  Dimitrios Don MD 11/28/2018 1223 Pathology      Findings: large free fragment disc herniation under L5 root on left, HNP at L45 on right as well  Complications: none  Implants: * No implants in log *

## 2018-11-28 NOTE — OP NOTES
[address]  OPERATIVE REPORT    Nathan Berger  MR#: [id number]  : 1960  ACCOUNT #: [de-identified]   DATE OF SERVICE: 2018    PROCEDURE PERFORMED:  Bilateral lumbar laminectomy L4, diskectomy W0-A8 and application topical epidural steroid injection. PREOPERATIVE DIAGNOSIS:  Herniated disk L4-L5 with left-sided foot drop. POSTOPERATIVE DIAGNOSIS:  Herniated disk L4-L5 with left-sided foot drop. SURGEON:  Clarisse Gonzalez MD.    COMPLICATIONS:  None. ESTIMATED BLOOD LOSS:  Minimal.    ANESTHESIA:  General.    IMPLANTS:  None. ASSISTANT:  [facility].    SPECIMENS REMOVED:   Disc     OPERATIVE PROCEDURE:  Review of imaging studies revealed a free fragment disk herniation that had migrated inferiorly on the left side at L4-5as well as a disk herniation on the opposite side in this patient with a left-sided foot drop and bilateral leg and foot pain. After discussing risks, benefits and alternatives of surgery with him, he agreed to proceed. He was taken to the operating room where he was induced under general endotracheal anesthesia and placed on the operating table in the prone position on chest rolls. The lumbar region was scrubbed, prepped and draped in the usual sterile fashion. A midline vertical incision was made and carried down the paraspinal muscle fascia. Towel clamp applied to the spinous process was noted to be at L5. It was replaced and placed at L4 and another x-ray obtained and the interspace was identified. Working first on the left side since that was the patient's most symptomatic side, hemilaminectomy was performed at L4 incidentally. Prior to making the incision after a timeout had been performed to identify the correct patient and procedure, appropriate antibiotics had been administered and sequential stockings were applied for DVT prophylaxis.   The ligamentum flavum was removed at the L4-L5 interspace level and a foraminotomy over the L5 root was performed. A small portion of the superior portion of the lamina of L5 was also removed in order to increase the exposure for the disk herniation that had migrated inferiorly below the level of the disk space based on the imaging studies. As the L5 root was noted to be curving around the pedicle of L5. A fragment of herniated disk material was peeking out from beneath it. I grasped it with a micropituitary and teased out a very large fragment of disk material corresponding to the imaging studies under the L5 root with both a dull nerve hook and a peapod pituitary rongeur, protecting the nerve root with a Love nerve root retractor, the disk space itself was incised with a #15 blade scalpel and further fragments of disk material from both outside and within the disk space were retrieved until the nerve root and thecal sac was seen to occupy a more normal anatomical shape and position. Bleeding was easily controlled with bipolar electrocautery. Attention was then directed to the opposite side since a significant portion of the disk herniation appeared to be on the right side as well according to the imaging studies. Hemilaminectomy at L4 on the right was performed. The ligamentum flavum removed and foraminotomy over the L5 root was performed. The disk space was inspected and indeed it was noted to be herniated posteriorly. Protecting the nerve root with a Love nerve root retractor, the disk was incised with a #15 blade scalpel and multiple fragments of disk material from within the annulus, but still outside of the disk space were retrieved and then from within the disk space until the nerve root on that side and thecal sac were felt to be well decompressed. The area was irrigated copiously with antibiotic irrigation. One mL of Depo-Medrol was then applied to the nerve roots on either side and the lateral aspect of the canals. FloSeal was placed over the laminectomy site.   Bone wax was used to wax the edges of the laminectomy. The incision was then closed in layers with 0 Vicryl in an interrupted fashion was used to close the fascia. The subcutaneous tissue with 3-0 interrupted inverted Vicryl sutures and a running 4-0 Monocryl in the subcuticular layer. Steri-Strips were applied to the skin edges and a sterile dressing was applied. The needle, sponge and instrument count were correct at the end of the procedure.       Christofer Abarca MD       JWREJI / LN  D: 11/28/2018 13:21     T: 11/28/2018 15:59  JOB #: [id number]  CC: Vikki Romero MD  CC: Miguel Salguero MD

## 2018-11-28 NOTE — PERIOP NOTES
TRANSFER - OUT REPORT:    Verbal report given to Cecilia Farooq RN (name) on [facility] [place] [place]  being transferred to [facility](unit) for routine post - op       Report consisted of patients Situation, Background, Assessment and   Recommendations(SBAR). Information from the following report(s) SBAR, Kardex, OR Summary, Intake/Output and MAR was reviewed with the receiving nurse. Opportunity for questions and clarification was provided.       Patient transported with:   O2 @ 2 liters  Tech

## 2018-11-28 NOTE — ANESTHESIA PREPROCEDURE EVALUATION
Anesthetic History   No history of anesthetic complications            Review of Systems / Medical History  Patient summary reviewed, nursing notes reviewed and pertinent labs reviewed    Pulmonary  Within defined limits          Asthma : well controlled       Neuro/Psych             Comments: Lumbar HNP (L4-L5)  Left-sided foot drop Cardiovascular  Within defined limits          Dysrhythmias : atrial fibrillation      Exercise tolerance: >4 METS  Comments: PAF - no events in several years   GI/Hepatic/Renal               Comments: H/O Colon Polyps Endo/Other  Within defined limits           Other Findings   Comments: Hx Colon Polyps  Family Physician         Physical Exam    Airway  Mallampati: II  TM Distance: 4 - 6 cm  Neck ROM: normal range of motion   Mouth opening: Normal     Cardiovascular  Regular rate and rhythm,  S1 and S2 normal,  no murmur, click, rub, or gallop             Dental  No notable dental hx       Pulmonary  Breath sounds clear to auscultation               Abdominal  GI exam deferred       Other Findings            Anesthetic Plan    ASA: 2  Anesthesia type: general          Induction: Intravenous  Anesthetic plan and risks discussed with: Patient

## 2018-11-28 NOTE — PROGRESS NOTES
Ortho/ NeuroSurgery NP Note    POD# 0  s/p BILATERAL LUMBAR LAMINECTOMY AND DISCECTOMY L4-5     Pt resting in bed. No complaints. Minimal pain in the incision area. Pre-op had left leg weakness and foot drop but not enough to trip. VSS Afebrile. Patient has had something to eat/drink. No nausea. Most Recent Labs:   Lab Results   Component Value Date/Time    HGB 15.1 11/27/2018 08:49 AM    Hemoglobin A1c 5.7 11/27/2018 08:49 AM       Body mass index is 26.97 kg/m². Reference: BMI greater than 30 is classified as obesity and greater than 40 is classified as morbid obesity. STOP BANG Score: 3    Voiding status: Patient needs to void today. Dressing c.d.i    Calves soft and supple; No pain with passive stretch  Sensation and motor intact  SCDs for mechanical DVT proph    Plan:  1) PT BID starting tomorrow  2) Alicia-op Antibiotics Ancef  3) Discharge plans to home with his wife.      Dung Redding NP

## 2018-11-29 VITALS
WEIGHT: 198.85 LBS | HEART RATE: 84 BPM | HEIGHT: 72 IN | TEMPERATURE: 98.3 F | DIASTOLIC BLOOD PRESSURE: 72 MMHG | SYSTOLIC BLOOD PRESSURE: 114 MMHG | RESPIRATION RATE: 84 BRPM | OXYGEN SATURATION: 96 % | BODY MASS INDEX: 26.93 KG/M2

## 2018-11-29 PROCEDURE — 94760 N-INVAS EAR/PLS OXIMETRY 1: CPT

## 2018-11-29 PROCEDURE — 74011250637 HC RX REV CODE- 250/637: Performed by: NEUROLOGICAL SURGERY

## 2018-11-29 PROCEDURE — 99218 HC RM OBSERVATION: CPT

## 2018-11-29 PROCEDURE — 97116 GAIT TRAINING THERAPY: CPT

## 2018-11-29 PROCEDURE — 74011250636 HC RX REV CODE- 250/636: Performed by: NEUROLOGICAL SURGERY

## 2018-11-29 PROCEDURE — 97165 OT EVAL LOW COMPLEX 30 MIN: CPT | Performed by: OCCUPATIONAL THERAPIST

## 2018-11-29 PROCEDURE — 97535 SELF CARE MNGMENT TRAINING: CPT | Performed by: OCCUPATIONAL THERAPIST

## 2018-11-29 PROCEDURE — 97161 PT EVAL LOW COMPLEX 20 MIN: CPT

## 2018-11-29 RX ORDER — AMOXICILLIN 250 MG
1 CAPSULE ORAL DAILY
Qty: 30 TAB | Refills: 0 | Status: SHIPPED | OUTPATIENT
Start: 2018-11-29 | End: 2019-02-28 | Stop reason: ALTCHOICE

## 2018-11-29 RX ORDER — POLYETHYLENE GLYCOL 3350 17 G/17G
17 POWDER, FOR SOLUTION ORAL
Qty: 15 PACKET | Refills: 0 | Status: SHIPPED | OUTPATIENT
Start: 2018-11-29 | End: 2018-12-14

## 2018-11-29 RX ORDER — ASPIRIN 81 MG/1
81 TABLET ORAL DAILY
Status: SHIPPED | COMMUNITY
Start: 2018-12-01 | End: 2019-01-18 | Stop reason: CLARIF

## 2018-11-29 RX ADMIN — ACETAMINOPHEN 650 MG: 325 TABLET ORAL at 05:24

## 2018-11-29 RX ADMIN — ACETAMINOPHEN 650 MG: 325 TABLET ORAL at 13:26

## 2018-11-29 RX ADMIN — Medication 2 G: at 04:00

## 2018-11-29 RX ADMIN — Medication 10 ML: at 05:24

## 2018-11-29 RX ADMIN — METOPROLOL SUCCINATE 50 MG: 50 TABLET, EXTENDED RELEASE ORAL at 09:13

## 2018-11-29 RX ADMIN — SODIUM CHLORIDE 125 ML/HR: 900 INJECTION, SOLUTION INTRAVENOUS at 09:08

## 2018-11-29 NOTE — PROGRESS NOTES
Occupational Therapy EVALUATION/discharge  Patient: Pati Dancer (86 y.o. male)  Date: 11/29/2018  Primary Diagnosis: HERNIATED LUMBAR DISC BILATERAL  Herniated lumbar intervertebral disc  Procedure(s) (LRB):  BILATERAL LUMBAR LAMINECTOMY AND DISCECTOMY L4-5 (N/A) 1 Day Post-Op   Precautions: no lifting >10 #       ASSESSMENT:   Based on the objective data described below, the patient presents at an Independent to Mod I level for basic self-care tasks. He demonstrates good awareness of safety. Patient reports he has a daughter in OT school. Educated on strategies for efficiency and putting less strain on lower body during bathing and dressing, like crossed leg technique. He is able to complete without difficulty. Further skilled acute occupational therapy is not indicated at this time. Discharge Recommendations: None  Further Equipment Recommendations for Discharge: None      SUBJECTIVE:   Patient stated My sciatica pain is better.     OBJECTIVE DATA SUMMARY:   HISTORY:   Past Medical History:   Diagnosis Date    Asthma, mild intermittent     Family history of prostate cancer     Grandfather    Foot drop, left     Intermittent atrial fibrillation ([facility] [address] 1/16/2017    Left hydrocele     Lumbar herniated disc     Personal history of colonic polyps     Negative colonoscopy 2015    Vitamin D deficiency      Past Surgical History:   Procedure Laterality Date    COLONOSCOPY N/A 6/15/2018    COLONOSCOPY performed by Manuel Houston MD at [address]; HI RISK IND  6/15/2018         HX COLONOSCOPY  06/11/2015    HX ORTHOPAEDIC      left knee surgery meniscectomy repair    HX UROLOGICAL  2018    hydrocele repair       Prior Level of Function/Environment/Context: Independent; still working and driving  Expanded or extensive additional review of patient history:     Home Situation  Home Environment: Private residence  # Steps to Enter: 2  Rails to Enter: No  One/Two Story Residence: One story(with basement)  Living Alone: No  Support Systems: Spouse/Significant Other/Partner  Patient Expects to be Discharged to[de-identified] Private residence  Current DME Used/Available at Home: None    Hand dominance: Right    EXAMINATION OF PERFORMANCE DEFICITS:  Cognitive/Behavioral Status:  Neurologic State: Alert  Orientation Level: Oriented X4  Cognition: Appropriate decision making; Appropriate safety awareness; Follows commands  Perception: Appears intact  Perseveration: No perseveration noted  Safety/Judgement: Awareness of environment; Insight into deficits;Good awareness of safety precautions    Range of Motion:  B UE WFL                            Strength:  B UE WFL                   Coordination:     Fine Motor Skills-Upper: Left Intact; Right Intact    Gross Motor Skills-Upper: Left Intact; Right Intact    Tone & Sensation:  Tone: Normal  Sensation:  c/o tingling/numbness in L LE                            Balance:  Sitting: Intact  Standing: Intact    Functional Mobility and Transfers for ADLs:  Bed Mobility:  Rolling: Independent  Supine to Sit: Independent  Scooting: Independent    Transfers:  Sit to Stand: Independent  Stand to Sit: Independent  Toilet Transfer : Independent  Shower Transfer: Modified independent    ADL Assessment:  Feeding: Independent    Oral Facial Hygiene/Grooming: Independent    Bathing: Modified independent    Upper Body Dressing: Independent    Lower Body Dressing: Modified independent    Toileting: Independent                ADL Intervention and task modifications:  Patient instructed and indicated understanding the benefits of maintaining activity tolerance, functional mobility, and independence with self care tasks during acute stay to ensure safe return home and to baseline. Encouraged patient to increase frequency and duration OOB, be out of bed for all meals, perform daily ADLs and functional mobility to/from bathroom.   Educated him to change his position every 45 minutes to an hour. Encouraged him to have his wife stand by the first time he gets in/out of the shower. Cognitive Retraining  Safety/Judgement: Awareness of environment; Insight into deficits;Good awareness of safety precautions    Functional Measure:  Barthel Index:    Bathin  Bladder: 10  Bowels: 10  Groomin  Dressing: 10  Feeding: 10  Mobility: 15  Stairs: 10  Toilet Use: 10  Transfer (Bed to Chair and Back): 15  Total: 100       Barthel and G-code impairment scale:  Percentage of impairment CH  0% CI  1-19% CJ  20-39% CK  40-59% CL  60-79% CM  80-99% CN  100%   Barthel Score 0-100 100 99-80 79-60 59-40 20-39 1-19   0   Barthel Score 0-20 20 17-19 13-16 9-12 5-8 1-4 0      The Barthel ADL Index: Guidelines  1. The index should be used as a record of what a patient does, not as a record of what a patient could do. 2. The main aim is to establish degree of independence from any help, physical or verbal, however minor and for whatever reason. 3. The need for supervision renders the patient not independent. 4. A patient's performance should be established using the best available evidence. Asking the patient, friends/relatives and nurses are the usual sources, but direct observation and common sense are also important. However direct testing is not needed. 5. Usually the patient's performance over the preceding 24-48 hours is important, but occasionally longer periods will be relevant. 6. Middle categories imply that the patient supplies over 50 per cent of the effort. 7. Use of aids to be independent is allowed. Pb Farrar., Barthel, D.W. (1490). Functional evaluation: the Barthel Index. [address] [facility] (14)2. Kayla Thibodeaux anh Marco Antonio, STEPHANIEF, Coni Villanueva., Lisa Barlow., Rolly Arvizu, [address] [place] [address] (). Measuring the change indisability after inpatient rehabilitation; comparison of the responsiveness of the Barthel Index and Functional Dyer Measure.  Journal of Neurology, Neurosurgery, and Psychiatry, 664), 412-509. MAGGIE Morris, FIDEL Angulo, & Pancho Eckert M.A. (2004.) Assessment of post-stroke quality of life in cost-effectiveness studies: The usefulness of the Barthel Index and the EuroQoL-5D. Quality of Life Research, 13, 799-63       G codes: In compliance with CMSs Claims Based Outcome Reporting, the following G-code set was chosen for this patient based on their primary functional limitation being treated: The outcome measure chosen to determine the severity of the functional limitation was the Barthel Index with a score of 100/100 which was correlated with the impairment scale. ? Self Care:     - CURRENT STATUS: CH - 0% impaired, limited or restricted    - GOAL STATUS: CH - 0% impaired, limited or restricted    - D/C STATUS:  CH - 0% impaired, limited or restricted     Occupational Therapy Evaluation Charge Determination   History Examination Decision-Making   LOW Complexity : Brief history review  LOW Complexity : 1-3 performance deficits relating to physical, cognitive , or psychosocial skils that result in activity limitations and / or participation restrictions  LOW Complexity : No comorbidities that affect functional and no verbal or physical assistance needed to complete eval tasks       Based on the above components, the patient evaluation is determined to be of the following complexity level: LOW   Pain:  No complaints this session    Activity Tolerance:   Excellent  After treatment:   [x]  Patient left in no apparent distress sitting up in chair  []  Patient left in no apparent distress in bed  [x]  Call bell left within reach  [x]  Nursing notified  []  Caregiver present  []  Bed alarm activated    COMMUNICATION/EDUCATION:   Communication/Collaboration:  [x]      Home safety education was provided and the patient/caregiver indicated understanding.   [x]      Patient/family have participated as able and agree with findings and recommendations. []      Patient is unable to participate in plan of care at this time.   Findings and recommendations were discussed with: Physical Therapist and Registered Nurse    Dara House OTR/L  Time Calculation: 22 mins

## 2018-11-29 NOTE — PROGRESS NOTES
Ortho / Neurosurgery NP Note    POD# 1  s/p BILATERAL LUMBAR LAMINECTOMY AND DISCECTOMY L4-5       Pt resting in bed. No complaints. Pain well controlled and reports he has oxycodone at home and plans to only use Ibuprofen at home so no need to give Rx for opioid    VSS Afebrile. Voiding status: + void    Labs  Lab Results   Component Value Date/Time    HGB 15.1 11/27/2018 08:49 AM      Lab Results   Component Value Date/Time    INR 1.0 11/27/2018 08:49 AM        Body mass index is 26.97 kg/m². : A BMI > 30 is classified as obesity and > 40 is classified as morbid obesity. Dressing c.d.i  Cryotherapy in place over incision  Calves soft and supple;  No pain with passive stretch  Sensation and motor intact  SCDs for mechanical DVT proph while in bed     PLAN:  1) PT BID  2) Readniess for discharge:     [x] Vital Signs stable    [x] + Voiding    [x] Wound intact, drainage minimal    [x] Tolerating PO intake     [x] Cleared by PT (OT if applicable)     [] Stair training completed (if applicable)    [] Independent / Contact Guard Assist (household distance)     [] Bed mobility     [] Car transfers     [] ADLs    [x] Adequate pain control on oral medication alone     Plan home today    Jerry Finney NP  DNP, ACNP-BC, ONP-C

## 2018-11-29 NOTE — PROGRESS NOTES
Patient left via wheelchair. All personal belongings with the patient. Wife at bedside to transport.

## 2018-11-29 NOTE — PROGRESS NOTES
Problem: Falls - Risk of  Goal: *Absence of Falls  Document Jie Fall Risk and appropriate interventions in the flowsheet.   Outcome: Resolved/Met Date Met: 11/29/18  Fall Risk Interventions:

## 2018-11-29 NOTE — DISCHARGE INSTRUCTIONS
Ross Fitzgerald M.D. What can I do?  The only exercise you should do is walking. Start by walking twice a day for 5 minutes, then increase by  2-3 minutes every day until you reach 25 minutes twice a day. DO NOT sit for long periods of time (20 minutes at a time for the first couple of weeks, then gradually increase.  No heavy lifting (5 lbs max); no straining, twisting, or bending.  No driving until your physician tells you it is ok. It is, however, ok to ride short distances in a car.  If you are required to wear a back brace, you may remove it when you are sleeping unless your doctor has advised against it.  If you are required to wear a cervical collar, you must sleep in it. You can remove it only for showers. What can I eat?  You may resume your regular home diet as tolerated. If your throat is sore, you may want to eat soft food for the first few days. When can I take a shower?  You may shower leaving on your occlusive (waterproof) dressing on allowing water to run over the dressing. The dressing may be removed in 5 days. The small pieces of tape (steri strips)  underneath it should stay on. Let water run over them, then pat dry gently.  Do not take baths or soak in pools.  You may remove your brace for showering. Medications:   Check with your physician before taking any anti-inflammatory medicines (Advil, Aleve, ibuprofen, aspirin).  Take prescription medicine as directed. DO NOT take more than prescribed. Call your physician if the prescribed dose is not sufficiently controlling your pain.  It is important to have regular bowel movements. Pain medications may cause constipation. Drink plenty of fluids, get enough fiber in your diet, and use stool softeners and drink prune juice to help prevent constipation. Do not take laxatives if at all possible except in severe situations.   It can result in a vicious cycle of constipation and diarrhea.  Do not put any ointments or creams on your incision unless directed to do so by your physician.  Tobacco products should be avoided during the postoperative phase. When do I see the physician again?  Please call your physicians office as soon as you get home to schedule your 1st post operative appointment. You should be seen approximately two weeks after your surgery. At this appointment you will see your doctors Assistant for a wound check and to answer any questions you may have.  You will see your physician approximately six weeks after your surgery.  If you had a fusion, you will need to get an order for xrays to be taken prior to the six week appointment. These should be done on the day of the appointment or 1-2 days before.  If you need the number to your doctors office, please request it from your nurse or see below. NOTIFY YOUR PHYSICIAN OF ANY OF THE FOLLOWING:     Fever above 101º for 24 hrs.  Nausea or vomiting.  Inability to urinate   Loss of bowel or bladder function (sudden onset of incontinence)   Changes in sensation (numbness, tingling, color change) in your extremities   Pain not relieved by your medicine.    Redness, swelling or drainage from your incision   Persistent pain in the calf of either leg   Development of severe pain      FOR APPOINTMENTS OR QUESTIONS CALL:    [facility] [address] [place] [address]  [phone]

## 2018-11-29 NOTE — DISCHARGE SUMMARY
Spine Discharge Summary    Patient ID:  Pati Dancer  [id number]  male  59 y.o.  1960    Admit date: 11/28/2018    Discharge date: 11/29/2018    Admitting Physician: Kateryna Rivera MD     Consulting Physician(s):   Treatment Team: Attending Provider: Farzad Henry MD; Nurse Practitioner: Demetra Titus NP; Care Manager: Emery Zelaya RN; Utilization Review: Ty Altamirano    Date of Surgery:   11/28/2018     Preoperative Diagnosis:  HERNIATED LUMBAR DISC BILATERAL    Postoperative Diagnosis:   HERNIATED LUMBAR DISC BILATERAL    Procedure(s):  BILATERAL LUMBAR LAMINECTOMY AND DISCECTOMY L4-5     Anesthesia Type:   General     Surgeon: Farzad Henry MD                            HPI:  Pt is a 59 y.o. male who has a history of HERNIATED LUMBAR [address]  with pain and limitations of activities of daily living who presents at this time for a Laminectomy following the failure of conservative management. PMH:   Past Medical History:   Diagnosis Date    Asthma, mild intermittent     Family history of prostate cancer     Grandfather    Foot drop, left     Intermittent atrial fibrillation ([address] 1/16/2017    Left hydrocele     Lumbar herniated disc     Personal history of colonic polyps     Negative colonoscopy 2015    Vitamin D deficiency        Body mass index is 26.97 kg/m². : A BMI > 30 is classified as obesity and > 40 is classified as morbid obesity. Medications upon admission :   Prior to Admission Medications   Prescriptions Last Dose Informant Patient Reported? Taking? albuterol (PROVENTIL HFA, VENTOLIN HFA, PROAIR HFA) 90 mcg/actuation inhaler 11/27/2018 at Unknown time  Yes Yes   Sig: Take 1 Puff by inhalation every six (6) hours as needed for Wheezing. Indications: BRONCHOSPASM PREVENTION   aspirin delayed-release 81 mg tablet 11/17/2018  Yes No   Sig: Take 81 mg by mouth daily. aspirin delayed-release 81 mg tablet   Yes Yes   Sig: Take 1 Tab by mouth daily.  May restart on    cholecalciferol (VITAMIN D3) 1,000 unit tablet 2018  Yes No   Sig: Take 2 Tabs by mouth daily. metoprolol succinate (TOPROL-XL) 100 mg tablet 2018 at 0700  No Yes   Si/2 to 1 tab daily as directed   Patient taking differently: Take 50 mg by mouth daily. 1/2 to 1 tab daily as directed      Facility-Administered Medications: None        Allergies:  No Known Allergies     Hospital Course: The patient underwent surgery. Complications:  None; patient tolerated the procedure well. Was taken to the PACU in stable condition and then transferred to the ortho floor. Perioperative Antibiotics:  Ancef     Postoperative Pain Management:  Oxycodone      Postoperative transfusions:    Number of units banked PRBCs =   none     Post Op complications: none    Hemoglobin at discharge:    Lab Results   Component Value Date/Time    HGB 15.1 2018 08:49 AM    INR 1.0 2018 08:49 AM       Dressing clean, dry and intact. No significant erythema or swelling. Neurovascular exam found to be within normal limits. Wound appears to be healing without any evidence of infection. Physical Therapy started on the day following surgery and participated in bed mobility, transfers and ambulation. Gait:  Gait  Gait Abnormalities: Foot drop(L)  Ambulation - Level of Assistance: Independent  Distance (ft): 640 Feet (ft)                   Discharged to: Home. Condition on Discharge:   stable    Discharge instructions:    - Take pain medications as prescribed  - Resume pre hospital diet      - Discharge activity: activity as tolerated  - Ambulate as tolerated  - Wound Care Keep wound clean and dry. See discharge instruction sheet. -DISCHARGE MEDICATION LIST     Current Discharge Medication List      START taking these medications    Details   polyethylene glycol (MIRALAX) 17 gram packet Take 1 Packet by mouth daily as needed (constipation) for up to 15 days.   Qty: 15 Packet, Refills: 0      senna-docusate (PERICOLACE) 8.6-50 mg per tablet Take 1 Tab by mouth daily. Qty: 30 Tab, Refills: 0         CONTINUE these medications which have CHANGED    Details   aspirin delayed-release 81 mg tablet Take 1 Tab by mouth daily. May restart on 12/01         CONTINUE these medications which have NOT CHANGED    Details   metoprolol succinate (TOPROL-XL) 100 mg tablet 1/2 to 1 tab daily as directed  Qty: 90 Tab, Refills: 1    Associated Diagnoses: Intermittent atrial fibrillation (HCC)      albuterol (PROVENTIL HFA, VENTOLIN HFA, PROAIR HFA) 90 mcg/actuation inhaler Take 1 Puff by inhalation every six (6) hours as needed for Wheezing. Indications: BRONCHOSPASM PREVENTION  Qty: 1 Inhaler, Refills: 11      cholecalciferol (VITAMIN D3) 1,000 unit tablet Take 2 Tabs by mouth daily. per medical continuation form      -Follow up in office in 2 weeks      Signed:  Governor Dsouza.  Gladys Turcios, ACNP-BC, ONP-C  Orthopaedic Nurse Practitioner    11/29/2018  11:30 AM

## 2018-11-29 NOTE — PROGRESS NOTES
physical Therapy EVALUATION/DISCHARGE  Patient: Vicky Vogt (75 y.o. male)  Date: 11/29/2018  Primary Diagnosis: HERNIATED LUMBAR DISC BILATERAL  Herniated lumbar intervertebral disc  Procedure(s) (LRB):  BILATERAL LUMBAR LAMINECTOMY AND DISCECTOMY L4-5 (N/A) 1 Day Post-Op   Precautions:      ASSESSMENT :  Based on the objective data described below, the patient presents with good mobility and no LOB. He was able to get out of bed and ambulate around the entire floor for 640ft without AD. Advised pt that his exercise program at the moment would be walking and to follow up with MD. Very safe and no questions or concerns from PT or OT. Does not need any services. He may benefit form PT for his drop foot in the future when cleared by MD. Will sign off. Cleared by PT. Skilled physical therapy is not indicated at this time. PLAN :  Discharge Recommendations: None  Further Equipment Recommendations for Discharge: none     SUBJECTIVE:   Patient stated my daughter is doing her clinical work for OT.    OBJECTIVE DATA SUMMARY:   HISTORY:    Past Medical History:   Diagnosis Date    Asthma, mild intermittent     Family history of prostate cancer     Grandfather    Foot drop, left     Intermittent atrial fibrillation ([address] 1/16/2017    Left hydrocele     Lumbar herniated disc     Personal history of colonic polyps     Negative colonoscopy 2015    Vitamin D deficiency      Past Surgical History:   Procedure Laterality Date    COLONOSCOPY N/A 6/15/2018    COLONOSCOPY performed by Wilfred Duffy MD at [facility]; HI RISK IND  6/15/2018         HX COLONOSCOPY  06/11/2015    HX ORTHOPAEDIC      left knee surgery meniscectomy repair    HX UROLOGICAL  2018    hydrocele repair     Prior Level of Function/Home Situation: pt is very active and independent.  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   Personal factors and/or comorbidities impacting plan of care:     [address] [place] [address] Environment: Private residence  # Steps to Enter: 2  Rails to [facility]: No  One/Two Story Residence: One story(with basement)  Living Alone: No  Support Systems: Spouse/Significant Other/Partner  Patient Expects to be Discharged to[de-identified] Private residence  Current DME Used/Available at Home: None    EXAMINATION/PRESENTATION/DECISION MAKING:   Critical Behavior:  Neurologic State: Alert  Orientation Level: Oriented X4  Cognition: Appropriate decision making, Appropriate safety awareness, Follows commands  Safety/Judgement: Awareness of environment, Insight into deficits, Good awareness of safety precautions  Hearing: Auditory  Auditory Impairment: None  Skin:  intact  Edema: none  Range Of Motion:         WDL                 Strength:           WDL              Tone & Sensation:             numbness and tingling LLE baseline prior to surgery                     Coordination:     Vision:      Functional Mobility:  Bed Mobility:  Rolling: Independent  Supine to Sit: Independent     Scooting: Independent  Transfers:  Sit to Stand: Independent  Stand to Sit: Independent                       Balance:   Sitting: Intact  Standing: Intact  Ambulation/Gait Training:  Distance (ft): 640 Feet (ft)     Ambulation - Level of Assistance: Independent        Gait Abnormalities: Foot drop(L)                     Functional Measure:  Barthel Index:    Bathin  Bladder: 10  Bowels: 10  Groomin  Dressing: 10  Feeding: 10  Mobility: 15  Stairs: 10  Toilet Use: 10  Transfer (Bed to Chair and Back): 15  Total: 100       Barthel and G-code impairment scale:  Percentage of impairment CH  0% CI  1-19% CJ  20-39% CK  40-59% CL  60-79% CM  80-99% CN  100%   Barthel Score 0-100 100 99-80 79-60 59-40 20-39 1-19   0   Barthel Score 0-20 20 17-19 13-16 9-12 5-8 1-4 0      The Barthel ADL Index: Guidelines  1. The index should be used as a record of what a patient does, not as a record of what a patient could do.   2. The main aim is to establish degree of independence from any help, physical or verbal, however minor and for whatever reason. 3. The need for supervision renders the patient not independent. 4. A patient's performance should be established using the best available evidence. Asking the patient, friends/relatives and nurses are the usual sources, but direct observation and common sense are also important. However direct testing is not needed. 5. Usually the patient's performance over the preceding 24-48 hours is important, but occasionally longer periods will be relevant. 6. Middle categories imply that the patient supplies over 50 per cent of the effort. 7. Use of aids to be independent is allowed. Arjun Quintero., Barthel, DCresencioW. (2010). Functional evaluation: the Barthel Index. [facility] (14)2. Cara Krishnamurthy anh VINCENT Bernard, Kimberly Carranza., Guadalupe German., [place], [facility] (1999). Measuring the change indisability after inpatient rehabilitation; comparison of the responsiveness of the Barthel Index and Functional [place] Measure. Journal of Neurology, Neurosurgery, and Psychiatry, 66(4), 362-258. Sudhakar Mora, N.J.A, FIDEL Angulo, & Sloane Cevallos MCresencioA. (2004.) Assessment of post-stroke quality of life in cost-effectiveness studies: The usefulness of the Barthel Index and the EuroQoL-5D. Quality of Life Research, 13, 282-66         G codes: In compliance with CMSs Claims Based Outcome Reporting, the following G-code set was chosen for this patient based on their primary functional limitation being treated: The outcome measure chosen to determine the severity of the functional limitation was the barthel with a score of 100/100 which was correlated with the impairment scale.     ? Mobility - Walking and Moving Around:     - CURRENT STATUS: CH - 0% impaired, limited or restricted    - GOAL STATUS: CH - 0% impaired, limited or restricted    - D/C STATUS:  CH - 0% impaired, limited or restricted        Physical Therapy Evaluation Charge Determination   History Examination Presentation Decision-Making   LOW Complexity : Zero comorbidities / personal factors that will impact the outcome / POC LOW Complexity : 1-2 Standardized tests and measures addressing body structure, function, activity limitation and / or participation in recreation  LOW Complexity : Stable, uncomplicated  Other outcome measures barthe  LOW       Based on the above components, the patient evaluation is determined to be of the following complexity level: LOW     Pain:  Pain Scale 1: Visual  Pain Intensity 1: 5  Pain Location 1: Back  Activity Tolerance:   VSS  Please refer to the flowsheet for vital signs taken during this treatment. After treatment:   [x]   Patient left in no apparent distress sitting up in chair  []   Patient left in no apparent distress in bed  [x]   Call bell left within reach  [x]   Nursing notified  []   Caregiver present  []   Bed alarm activated    COMMUNICATION/EDUCATION:   Communication/Collaboration:  [x]   Fall prevention education was provided and the patient/caregiver indicated understanding. [x]   Patient/family have participated as able and agree with findings and recommendations. []   Patient is unable to participate in plan of care at this time.   Findings and recommendations were discussed with: Occupational Therapist, Registered Nurse and NP    Thank you for this referral.  Mylinda Cooks, PT, DPT   Time Calculation: 14 mins

## 2018-11-29 NOTE — PROGRESS NOTES
Excellent recovery, no leg pain  Weakness in left leg /foot persists but will get PT post op. afeb vss  Ok to d/c home  Will see in office in 1-2 weeks

## 2018-11-30 ENCOUNTER — PATIENT OUTREACH (OUTPATIENT)
Dept: OTHER | Age: 58
End: 2018-11-30

## 2018-11-30 NOTE — PROGRESS NOTES
CM  outreach      Verified  and address for HIPAA security. Introduced [employer] for patient. Patient does not identify any Care Management needs at this time and declines services. * Dr. Ben Pena is a physician and sees no need for CM calls. Chart Review:    DATE OF SERVICE: 2018     PROCEDURE PERFORMED:  Bilateral lumbar laminectomy L4, diskectomy W4-D4 and application topical epidural steroid injection.     PREOPERATIVE DIAGNOSIS:  Herniated disk L4-L5 with left-sided foot drop.       POSTOPERATIVE DIAGNOSIS:  Herniated disk L4-L5 with left-sided foot drop.     SURGEON:  Fransisca Rodgers MD.           Discharge instructions:     - Take pain medications as prescribed  - Resume pre hospital diet      - Discharge activity: activity as tolerated  - Ambulate as tolerated  - Wound Care Keep wound clean and dry. See discharge instruction sheet.             -DISCHARGE MEDICATION LIST          Current Discharge Medication List            START taking these medications     Details   polyethylene glycol (MIRALAX) 17 gram packet Take 1 Packet by mouth daily as needed (constipation) for up to 15 days. Qty: 15 Packet, Refills: 0       senna-docusate (PERICOLACE) 8.6-50 mg per tablet Take 1 Tab by mouth daily. Qty: 30 Tab, Refills: 0                CONTINUE these medications which have CHANGED     Details   aspirin delayed-release 81 mg tablet Take 1 Tab by mouth daily. May restart on                 CONTINUE these medications which have NOT CHANGED     Details   metoprolol succinate (TOPROL-XL) 100 mg tablet 1/2 to 1 tab daily as directed  Qty: 90 Tab, Refills: 1     Associated Diagnoses: Intermittent atrial fibrillation (HCC)       albuterol (PROVENTIL HFA, VENTOLIN HFA, PROAIR HFA) 90 mcg/actuation inhaler Take 1 Puff by inhalation every six (6) hours as needed for Wheezing.  Indications: BRONCHOSPASM PREVENTION  Qty: 1 Inhaler, Refills: 11       cholecalciferol (VITAMIN D3) 1,000 unit tablet Take 2 Tabs by mouth daily.

## 2018-12-31 LAB — CRP SERPL HS-MCNC: 73.22 MG/L (ref 0–3)

## 2019-01-10 ENCOUNTER — HOSPITAL ENCOUNTER (OUTPATIENT)
Dept: MRI IMAGING | Age: 59
Discharge: HOME | End: 2019-01-10
Attending: INTERNAL MEDICINE
Payer: COMMERCIAL

## 2019-01-10 DIAGNOSIS — R50.9 FEVER, UNSPECIFIED FEVER CAUSE: ICD-10-CM

## 2019-01-10 DIAGNOSIS — M54.50 ACUTE MIDLINE LOW BACK PAIN WITHOUT SCIATICA: ICD-10-CM

## 2019-01-10 PROCEDURE — 74011250636 HC RX REV CODE- 250/636: Performed by: INTERNAL MEDICINE

## 2019-01-10 PROCEDURE — A9575 INJ GADOTERATE MEGLUMI 0.1ML: HCPCS | Performed by: INTERNAL MEDICINE

## 2019-01-10 PROCEDURE — 72158 MRI LUMBAR SPINE W/O & W/DYE: CPT

## 2019-01-10 RX ORDER — GADOTERATE MEGLUMINE 376.9 MG/ML
20 INJECTION INTRAVENOUS
Status: COMPLETED | OUTPATIENT
Start: 2019-01-10 | End: 2019-01-10

## 2019-01-10 RX ADMIN — GADOTERATE MEGLUMINE 17 ML: 376.9 INJECTION INTRAVENOUS at 17:32

## 2019-01-11 ENCOUNTER — APPOINTMENT (OUTPATIENT)
Dept: INTERVENTIONAL RADIOLOGY/VASCULAR | Age: 59
DRG: 552 | End: 2019-01-11
Attending: EMERGENCY MEDICINE
Payer: COMMERCIAL

## 2019-01-11 ENCOUNTER — HOSPITAL ENCOUNTER (INPATIENT)
Age: 59
LOS: 3 days | Discharge: HOME | DRG: 552 | End: 2019-01-14
Attending: EMERGENCY MEDICINE | Admitting: INTERNAL MEDICINE
Payer: COMMERCIAL

## 2019-01-11 DIAGNOSIS — M86.9 OSTEOMYELITIS, UNSPECIFIED SITE, UNSPECIFIED TYPE (HCC): Primary | ICD-10-CM

## 2019-01-11 PROBLEM — M46.20 ACUTE OSTEOMYELITIS OF SPINE (HCC): Status: ACTIVE | Noted: 2019-01-11

## 2019-01-11 LAB
ANION GAP SERPL CALC-SCNC: 8 MMOL/L (ref 5–15)
BASOPHILS # BLD: 0 K/UL (ref 0–0.1)
BASOPHILS NFR BLD: 1 % (ref 0–1)
BUN SERPL-MCNC: 17 MG/DL (ref 6–20)
BUN/CREAT SERPL: 19 (ref 12–20)
CALCIUM SERPL-MCNC: 8.4 MG/DL (ref 8.5–10.1)
CHLORIDE SERPL-SCNC: 103 MMOL/L (ref 97–108)
CO2 SERPL-SCNC: 27 MMOL/L (ref 21–32)
CREAT SERPL-MCNC: 0.89 MG/DL (ref 0.7–1.3)
DIFFERENTIAL METHOD BLD: ABNORMAL
EOSINOPHIL # BLD: 0 K/UL (ref 0–0.4)
EOSINOPHIL NFR BLD: 0 % (ref 0–7)
ERYTHROCYTE [DISTWIDTH] IN BLOOD BY AUTOMATED COUNT: 11.5 % (ref 11.5–14.5)
ERYTHROCYTE [SEDIMENTATION RATE] IN BLOOD: 48 MM/HR (ref 0–20)
GLUCOSE SERPL-MCNC: 107 MG/DL (ref 65–100)
HCT VFR BLD AUTO: 40.6 % (ref 36.6–50.3)
HGB BLD-MCNC: 13.8 G/DL (ref 12.1–17)
IMM GRANULOCYTES # BLD AUTO: 0 K/UL (ref 0–0.04)
IMM GRANULOCYTES NFR BLD AUTO: 0 % (ref 0–0.5)
LYMPHOCYTES # BLD: 1.3 K/UL (ref 0.8–3.5)
LYMPHOCYTES NFR BLD: 15 % (ref 12–49)
MCH RBC QN AUTO: 31.4 PG (ref 26–34)
MCHC RBC AUTO-ENTMCNC: 34 G/DL (ref 30–36.5)
MCV RBC AUTO: 92.5 FL (ref 80–99)
MONOCYTES # BLD: 0.7 K/UL (ref 0–1)
MONOCYTES NFR BLD: 9 % (ref 5–13)
NEUTS SEG # BLD: 6.2 K/UL (ref 1.8–8)
NEUTS SEG NFR BLD: 75 % (ref 32–75)
NRBC # BLD: 0 K/UL (ref 0–0.01)
NRBC BLD-RTO: 0 PER 100 WBC
PLATELET # BLD AUTO: 271 K/UL (ref 150–400)
PMV BLD AUTO: 8.7 FL (ref 8.9–12.9)
POTASSIUM SERPL-SCNC: 4.1 MMOL/L (ref 3.5–5.1)
RBC # BLD AUTO: 4.39 M/UL (ref 4.1–5.7)
SODIUM SERPL-SCNC: 138 MMOL/L (ref 136–145)
WBC # BLD AUTO: 8.3 K/UL (ref 4.1–11.1)

## 2019-01-11 PROCEDURE — 85025 COMPLETE CBC W/AUTO DIFF WBC: CPT

## 2019-01-11 PROCEDURE — 74011250636 HC RX REV CODE- 250/636: Performed by: INTERNAL MEDICINE

## 2019-01-11 PROCEDURE — 74011250637 HC RX REV CODE- 250/637: Performed by: INTERNAL MEDICINE

## 2019-01-11 PROCEDURE — 65270000029 HC RM PRIVATE

## 2019-01-11 PROCEDURE — 99285 EMERGENCY DEPT VISIT HI MDM: CPT

## 2019-01-11 PROCEDURE — 87205 SMEAR GRAM STAIN: CPT

## 2019-01-11 PROCEDURE — 36415 COLL VENOUS BLD VENIPUNCTURE: CPT

## 2019-01-11 PROCEDURE — 87076 CULTURE ANAEROBE IDENT EACH: CPT

## 2019-01-11 PROCEDURE — 77030003666 HC NDL SPINAL BD -A

## 2019-01-11 PROCEDURE — 74011250636 HC RX REV CODE- 250/636: Performed by: STUDENT IN AN ORGANIZED HEALTH CARE EDUCATION/TRAINING PROGRAM

## 2019-01-11 PROCEDURE — 99153 MOD SED SAME PHYS/QHP EA: CPT

## 2019-01-11 PROCEDURE — 99152 MOD SED SAME PHYS/QHP 5/>YRS: CPT

## 2019-01-11 PROCEDURE — 85652 RBC SED RATE AUTOMATED: CPT

## 2019-01-11 PROCEDURE — 87075 CULTR BACTERIA EXCEPT BLOOD: CPT

## 2019-01-11 PROCEDURE — 80048 BASIC METABOLIC PNL TOTAL CA: CPT

## 2019-01-11 PROCEDURE — 74011000258 HC RX REV CODE- 258: Performed by: INTERNAL MEDICINE

## 2019-01-11 PROCEDURE — 62267 INTERDISCAL PERQ ASPIR DX: CPT

## 2019-01-11 RX ORDER — ALBUTEROL SULFATE 90 UG/1
1 AEROSOL, METERED RESPIRATORY (INHALATION)
Status: DISCONTINUED | OUTPATIENT
Start: 2019-01-11 | End: 2019-01-14 | Stop reason: HOSPADM

## 2019-01-11 RX ORDER — LIDOCAINE HYDROCHLORIDE 20 MG/ML
20 INJECTION, SOLUTION INFILTRATION; PERINEURAL ONCE
Status: COMPLETED | OUTPATIENT
Start: 2019-01-11 | End: 2019-01-11

## 2019-01-11 RX ORDER — MIDAZOLAM HYDROCHLORIDE 1 MG/ML
5 INJECTION, SOLUTION INTRAMUSCULAR; INTRAVENOUS
Status: DISCONTINUED | OUTPATIENT
Start: 2019-01-11 | End: 2019-01-11

## 2019-01-11 RX ORDER — LIDOCAINE HYDROCHLORIDE 20 MG/ML
INJECTION, SOLUTION EPIDURAL; INFILTRATION; INTRACAUDAL; PERINEURAL
Status: DISPENSED
Start: 2019-01-11 | End: 2019-01-12

## 2019-01-11 RX ORDER — VANCOMYCIN 1.75 GRAM/500 ML IN 0.9 % SODIUM CHLORIDE INTRAVENOUS
1750
Status: COMPLETED | OUTPATIENT
Start: 2019-01-11 | End: 2019-01-11

## 2019-01-11 RX ORDER — ACETAMINOPHEN 325 MG/1
650 TABLET ORAL
Status: DISCONTINUED | OUTPATIENT
Start: 2019-01-11 | End: 2019-01-14 | Stop reason: HOSPADM

## 2019-01-11 RX ORDER — PANTOPRAZOLE SODIUM 40 MG/1
40 TABLET, DELAYED RELEASE ORAL
Status: DISCONTINUED | OUTPATIENT
Start: 2019-01-12 | End: 2019-01-14 | Stop reason: HOSPADM

## 2019-01-11 RX ORDER — DOCUSATE SODIUM 100 MG/1
100 CAPSULE, LIQUID FILLED ORAL
Status: DISCONTINUED | OUTPATIENT
Start: 2019-01-11 | End: 2019-01-14 | Stop reason: HOSPADM

## 2019-01-11 RX ORDER — OXYCODONE AND ACETAMINOPHEN 5; 325 MG/1; MG/1
1 TABLET ORAL
Status: DISCONTINUED | OUTPATIENT
Start: 2019-01-11 | End: 2019-01-13

## 2019-01-11 RX ORDER — SODIUM CHLORIDE 9 MG/ML
25 INJECTION, SOLUTION INTRAVENOUS CONTINUOUS
Status: DISCONTINUED | OUTPATIENT
Start: 2019-01-11 | End: 2019-01-11

## 2019-01-11 RX ORDER — MELATONIN
2000 DAILY
Status: DISCONTINUED | OUTPATIENT
Start: 2019-01-12 | End: 2019-01-14 | Stop reason: HOSPADM

## 2019-01-11 RX ORDER — FENTANYL CITRATE 50 UG/ML
100 INJECTION, SOLUTION INTRAMUSCULAR; INTRAVENOUS
Status: DISCONTINUED | OUTPATIENT
Start: 2019-01-11 | End: 2019-01-11

## 2019-01-11 RX ORDER — AMOXICILLIN 250 MG
1 CAPSULE ORAL DAILY
Status: DISCONTINUED | OUTPATIENT
Start: 2019-01-12 | End: 2019-01-14 | Stop reason: HOSPADM

## 2019-01-11 RX ORDER — METOPROLOL SUCCINATE 25 MG/1
25 TABLET, EXTENDED RELEASE ORAL DAILY
Status: DISCONTINUED | OUTPATIENT
Start: 2019-01-12 | End: 2019-01-14 | Stop reason: HOSPADM

## 2019-01-11 RX ORDER — SODIUM CHLORIDE 0.9 % (FLUSH) 0.9 %
5-40 SYRINGE (ML) INJECTION EVERY 8 HOURS
Status: DISCONTINUED | OUTPATIENT
Start: 2019-01-11 | End: 2019-01-14 | Stop reason: HOSPADM

## 2019-01-11 RX ORDER — SODIUM CHLORIDE 0.9 % (FLUSH) 0.9 %
5-40 SYRINGE (ML) INJECTION AS NEEDED
Status: DISCONTINUED | OUTPATIENT
Start: 2019-01-11 | End: 2019-01-14 | Stop reason: HOSPADM

## 2019-01-11 RX ORDER — ONDANSETRON 2 MG/ML
4 INJECTION INTRAMUSCULAR; INTRAVENOUS
Status: DISCONTINUED | OUTPATIENT
Start: 2019-01-11 | End: 2019-01-14 | Stop reason: HOSPADM

## 2019-01-11 RX ADMIN — Medication 10 ML: at 20:22

## 2019-01-11 RX ADMIN — LIDOCAINE HYDROCHLORIDE 200 MG: 20 INJECTION, SOLUTION INFILTRATION; PERINEURAL at 16:39

## 2019-01-11 RX ADMIN — Medication 10 ML: at 23:47

## 2019-01-11 RX ADMIN — MIDAZOLAM HYDROCHLORIDE 2 MG: 1 INJECTION, SOLUTION INTRAMUSCULAR; INTRAVENOUS at 16:30

## 2019-01-11 RX ADMIN — MIDAZOLAM HYDROCHLORIDE 1 MG: 1 INJECTION, SOLUTION INTRAMUSCULAR; INTRAVENOUS at 16:23

## 2019-01-11 RX ADMIN — CEFEPIME HYDROCHLORIDE 2 G: 2 INJECTION, POWDER, FOR SOLUTION INTRAVENOUS at 20:22

## 2019-01-11 RX ADMIN — MIDAZOLAM HYDROCHLORIDE 1 MG: 1 INJECTION, SOLUTION INTRAMUSCULAR; INTRAVENOUS at 16:37

## 2019-01-11 RX ADMIN — SODIUM CHLORIDE 25 ML/HR: 900 INJECTION, SOLUTION INTRAVENOUS at 16:20

## 2019-01-11 RX ADMIN — FENTANYL CITRATE 25 MCG: 50 INJECTION, SOLUTION INTRAMUSCULAR; INTRAVENOUS at 16:34

## 2019-01-11 RX ADMIN — FENTANYL CITRATE 50 MCG: 50 INJECTION, SOLUTION INTRAMUSCULAR; INTRAVENOUS at 16:23

## 2019-01-11 RX ADMIN — VANCOMYCIN HYDROCHLORIDE 1750 MG: 10 INJECTION, POWDER, LYOPHILIZED, FOR SOLUTION INTRAVENOUS at 21:24

## 2019-01-11 RX ADMIN — OXYCODONE AND ACETAMINOPHEN 1 TABLET: 5; 325 TABLET ORAL at 19:09

## 2019-01-11 RX ADMIN — FENTANYL CITRATE 50 MCG: 50 INJECTION, SOLUTION INTRAMUSCULAR; INTRAVENOUS at 16:25

## 2019-01-11 RX ADMIN — FENTANYL CITRATE 50 MCG: 50 INJECTION, SOLUTION INTRAMUSCULAR; INTRAVENOUS at 16:38

## 2019-01-11 RX ADMIN — MIDAZOLAM HYDROCHLORIDE 1 MG: 1 INJECTION, SOLUTION INTRAMUSCULAR; INTRAVENOUS at 16:25

## 2019-01-11 NOTE — H&P
Hospitalist Admission Note    NAME: Ahsan Romo   :  [birth year]   MRN:  [id number]     Date/Time:  2019 3:53 PM    Patient PCP: Sherice Deluna MD  ________________________________________________________________________    My assessment of this patient's clinical condition and my plan of care is as follows.     Assessment / Plan:  Acute osteomyelitis of L4-L5 discitis in the setting of  Recent L4 laminectomy  He is going for bone biopsy right now  We will start empiric vancomycin and cefepime after bone biopsy is complete  Send bone tissue and also synovial fluid for culture and Gram stain  Neurosurgery Dr. Leia Boyce has been consulted  Consult infectious disease  Start Percocet for pain control  Keep n.p.o.  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  He was in his usual state of health until about 3 months ago when he had a sudden episode of coughing and subsequently started having acute low back pain and also had foot drop and subsequently was noted to have acute disc herniation at the level of L4-L5 for which he underwent surgery by Dr. Leia Boyce and recovered well with resolution of foot drop.  Around [place] he started having some acute low back pain, chills and borderline features for which she underwent an MRI which was within normal limits and was prescribed steroids and pain medications.  After steroids were completed, he started having low-grade fever along with chills and acute back pain for which he saw his primary care physician and underwent a MRI today which showed evidence of osteomyelitis at the level of L4-L5 and was advised to come to the emergency department for further evaluation. Preston Arteaga currently reports pain is about 5 x 10, increased with activity and without any relieving factors.  He also reports borderline fever along with chills.  His pain is currently controlled with Percocet.  Does not report any other complaints at this time. On arrival to the hospital his vital signs were within normal limits.  On lab work his CBC was within normal limits.  BMP was also normal.  IR was contacted upon patient's arrival and they are planning on bone biopsy soon. We were asked to admit for work up and evaluation of the above problems. Past Medical History:   Diagnosis Date    Asthma, mild intermittent     Family history of prostate cancer     Grandfather    Foot drop, left     Intermittent atrial fibrillation ([address] 1/16/2017    Left hydrocele     Lumbar herniated disc     Personal history of colonic polyps     Negative colonoscopy 2015    Vitamin D deficiency         Past Surgical History:   Procedure Laterality Date    COLONOSCOPY N/A 6/15/2018    COLONOSCOPY performed by Amos Verduzco MD at [address] [place] [address]; HI RISK IND  6/15/2018         HX COLONOSCOPY  06/11/2015    HX ORTHOPAEDIC      left knee surgery meniscectomy repair    HX UROLOGICAL  2018    hydrocele repair       Social History     Tobacco Use    Smoking status: Never Smoker    Smokeless tobacco: Never Used   Substance Use Topics    Alcohol use:  Yes     Alcohol/week: 1.2 oz     Types: 2 Glasses of wine per week     Comment: 2 glasses aaron daily        Family History   Problem Relation Age of Onset  Cancer Mother         breast    Cancer Father         colon     No Known Allergies     Prior to Admission medications    Medication Sig Start Date End Date Taking? Authorizing Provider   meloxicam (MOBIC) 15 mg tablet Take 15 mg by mouth daily. Yes Provider, Historical   oxyCODONE-acetaminophen (PERCOCET) 5-325 mg per tablet 1-2 tabs po at bedtime. 12/27/18  Yes Ellis Heller PA-C   aspirin delayed-release 81 mg tablet Take 1 Tab by mouth daily. May restart on 12/01 12/1/18  Yes Queen Seng NP   omeprazole (PRILOSEC) 20 mg capsule Take 20 mg by mouth daily. Provider, Historical   senna-docusate (PERICOLACE) 8.6-50 mg per tablet Take 1 Tab by mouth daily. 11/29/18   Queen Seng NP   metoprolol succinate (TOPROL-XL) 100 mg tablet 1/2 to 1 tab daily as directed  Patient taking differently: Take 50 mg by mouth daily. 1/2 to 1 tab daily as directed 7/14/18   Charan Powers MD   cholecalciferol (VITAMIN D3) 1,000 unit tablet Take 2 Tabs by mouth daily. 5/10/17   Charan Powers MD   albuterol (PROVENTIL HFA, VENTOLIN HFA, PROAIR HFA) 90 mcg/actuation inhaler Take 1 Puff by inhalation every six (6) hours as needed for Wheezing. Indications: BRONCHOSPASM PREVENTION 5/10/17   Charan Powers MD       REVIEW OF SYSTEMS:     I am not able to complete the review of systems because:    The patient is intubated and sedated    The patient has altered mental status due to his acute medical problems    The patient has baseline aphasia from prior stroke(s)    The patient has baseline dementia and is not reliable historian    The patient is in acute medical distress and unable to provide information           Total of 12 systems reviewed as follows:       POSITIVE= underlined text  Negative = text not underlined  General:  fever, chills, sweats, generalized weakness, weight loss/gain,      loss of appetite   Eyes:    blurred vision, eye pain, loss of vision, double vision  ENT:    rhinorrhea, pharyngitis   Respiratory:   cough, sputum production, SOB, AGUILLON, wheezing, pleuritic pain   Cardiology:   chest pain, palpitations, orthopnea, PND, edema, syncope   Gastrointestinal:  abdominal pain , N/V, diarrhea, dysphagia, constipation, bleeding   Genitourinary:  frequency, urgency, dysuria, hematuria, incontinence   Muskuloskeletal :  arthralgia, myalgia, back pain  Hematology:  easy bruising, nose or gum bleeding, lymphadenopathy   Dermatological: rash, ulceration, pruritis, color change / jaundice  Endocrine:   hot flashes or polydipsia   Neurological:  headache, dizziness, confusion, focal weakness, paresthesia,     Speech difficulties, memory loss, gait difficulty  Psychological: Feelings of anxiety, depression, agitation    Objective:   VITALS:    Visit Vitals  /68 (BP 1 Location: Left arm)   Pulse 81   Temp 98.4 °F (36.9 °C)   Resp 16   Ht 6' (1.829 m)   Wt 89.9 kg (198 lb 3.1 oz)   SpO2 100%   BMI 26.88 kg/m²       PHYSICAL EXAM:    General:    Alert, cooperative, no distress, appears stated age. HEENT: Atraumatic, anicteric sclerae, pink conjunctivae     No oral ulcers, mucosa moist, throat clear, dentition fair  Neck:  Supple, symmetrical,  thyroid: non tender  Lungs:   Clear to auscultation bilaterally. No Wheezing or Rhonchi. No rales. Chest wall:  No tenderness  No Accessory muscle use. Heart:   Regular  rhythm,  No  murmur   No edema  Abdomen:   Soft, non-tender. Not distended. Bowel sounds normal  Extremities: No cyanosis. No clubbing,      Skin turgor normal, Capillary refill normal, Radial dial pulse 2+  Skin:     Scar in the back, well healed  Psych:  Not anxious or agitated. Neurologic: EOMs intact. No facial asymmetry. No aphasia or slurred speech. Symmetrical strength, Sensation grossly intact.  Alert and oriented X 4.     _______________________________________________________________________  Care Plan discussed with:    Comments   Patient y    Family      RN y    Care Manager                    Consultant:      _______________________________________________________________________  Expected  Disposition:   Home with Family y   HH/PT/OT/RN    SNF/LTC    RADHA    ________________________________________________________________________  TOTAL TIME:  61  Minutes    Critical Care Provided     Minutes non procedure based      Comments    y Reviewed previous records   >50% of visit spent in counseling and coordination of care y Discussion with patient and/or family and questions answered       ________________________________________________________________________  Signed: Jennifer Gold MD    Procedures: see electronic medical records for all procedures/Xrays and details which were not copied into this note but were reviewed prior to creation of Plan. LAB DATA REVIEWED:    Recent Results (from the past 24 hour(s))   CBC WITH AUTOMATED DIFF    Collection Time: 01/11/19  1:54 PM   Result Value Ref Range    WBC 8.3 4.1 - 11.1 K/uL    RBC 4.39 4. 10 - 5.70 M/uL    HGB 13.8 12.1 - 17.0 g/dL    HCT 40.6 36.6 - 50.3 %    MCV 92.5 80.0 - 99.0 FL    MCH 31.4 26.0 - 34.0 PG    MCHC 34.0 30.0 - 36.5 g/dL    RDW 11.5 11.5 - 14.5 %    PLATELET 369 412 - 682 K/uL    MPV 8.7 (L) 8.9 - 12.9 FL    NRBC 0.0 0  WBC    ABSOLUTE NRBC 0.00 0.00 - 0.01 K/uL    NEUTROPHILS 75 32 - 75 %    LYMPHOCYTES 15 12 - 49 %    MONOCYTES 9 5 - 13 %    EOSINOPHILS 0 0 - 7 %    BASOPHILS 1 0 - 1 %    IMMATURE GRANULOCYTES 0 0.0 - 0.5 %    ABS. NEUTROPHILS 6.2 1.8 - 8.0 K/UL    ABS. LYMPHOCYTES 1.3 0.8 - 3.5 K/UL    ABS. MONOCYTES 0.7 0.0 - 1.0 K/UL    ABS. EOSINOPHILS 0.0 0.0 - 0.4 K/UL    ABS. BASOPHILS 0.0 0.0 - 0.1 K/UL    ABS. IMM.  GRANS. 0.0 0.00 - 0.04 K/UL    DF AUTOMATED     METABOLIC PANEL, BASIC    Collection Time: 01/11/19  1:54 PM   Result Value Ref Range    Sodium 138 136 - 145 mmol/L    Potassium 4.1 3.5 - 5.1 mmol/L    Chloride 103 97 - 108 mmol/L    CO2 27 21 - 32 mmol/L    Anion gap 8 5 - 15 mmol/L    Glucose 107 (H) 65 - 100 mg/dL    BUN 17 6 - 20 MG/DL    Creatinine 0.89 0.70 - 1.30 MG/DL    BUN/Creatinine ratio 19 12 - 20      GFR est AA >60 >60 ml/min/1.73m2    GFR est non-AA >60 >60 ml/min/1.73m2    Calcium 8.4 (L) 8.5 - 10.1 MG/DL   SED RATE (ESR)    Collection Time: 01/11/19  1:54 PM   Result Value Ref Range    Sed rate, automated 48 (H) 0 - 20 mm/hr

## 2019-01-11 NOTE — PROGRESS NOTES
Pharmacy Automatic Renal Dosing Protocol - Antimicrobials    Indication for Antimicrobials: bone and joint infection     Current Regimen of Each Antimicrobial:  Vancomycin pharmacy to dose (Start Date 19; Day # 1)  Cefepime 2g IV every 8hr (start date: 19, Day 1)    Previous Antimicrobial Therapy:      Goal Level: VANCOMYCIN TROUGH GOAL RANGE    Vancomycin Trough: 15 - 20 mcg/mL    Date Dose & Interval Measured (mcg/mL) Extrapolated (mcg/mL)                       Date & time of next level: to be determined    Significant Cultures:   none    Radiology / Imaging results: (X-ray, CT scan or MRI): none    Paralysis, amputations, malnutrition: none    Labs:  Recent Labs     19  1354   CREA 0.89   BUN 17   WBC 8.3     Temp (24hrs), Av.9 °F (36.6 °C), Min:97.3 °F (36.3 °C), Max:98.4 °F (36.9 °C)    Creatinine Clearance (mL/min) or Dialysis: ~97mL/min    Impression/Plan:   · Ok to verify cefepime 2g IV every 8hr based on indication and renal function  · Give vancomycin 1750mg IV x 1 loading dose, then 1000mg IV every 8hr to yield a trough of ~17mcg/mL  · Give vancomycin and cefepime after bone biopsy is complete  · Antimicrobial stop date to be determined     Pharmacy will follow daily and adjust medications as appropriate for renal function and/or serum levels. Thank you,  GABRIELLE Gale    Recommended duration of therapy  [email or web address]/[place]/[facility]/Pharmacy/Clinical%20Companion/[email or web address]%[email or web address]. docx    Renal Dosing  [email or web address]/[place]/[facility]/Pharmacy/Clinical%20Companion/Renal%[id number]. pdf

## 2019-01-11 NOTE — ED NOTES
Assumed care of patient. Patient is alert and oriented, does not appear to be in distress. Patient ambulatory to ED with c/o low back pain.  States he was sent from ortho MD for Osteomyelitis

## 2019-01-11 NOTE — ROUTINE PROCESS
1520- Pt in for disc aspiration. Work up completed. 1600- Dr Cesario Su in to consult pt. Consent obtained. 1640- Specimen L4-5 disc aspirate for culture sent to lab. 
1700- Pt to ED. VSS. Able to move all 4 ext. Drsg D/I to area. Pt states PL at 2.

## 2019-01-11 NOTE — ED PROVIDER NOTES
EMERGENCY DEPARTMENT HISTORY AND PHYSICAL EXAM      Date: 1/11/2019  Patient Name: Juan J Bauer    History of Presenting Illness     Chief Complaint   Patient presents with    Back Pain     \"i started having lower back pain and fevers so i went to ortho and they osteomylitis diagnosed this am after having an MRI last night from the surgery i had 1 month ago. \"       History Provided By: Patient    HPI: Juan J Bauer, 59 y.o. male with PMHx significant for afib, asthma, lumbar herniated disc, presents ambulatory to the ED with cc of a worsening back pain x 3 weeks. He reports associated symptoms of a low grade fever ranging from 100-100.5F and chills. Pt states he had a lumbar microdiscectomy x 6 weeks ago with Dr. Ulises Bryan. About 3 weeks ago, he had started to experience a worsening of his back pain. He notes his CBC was normal, SED rate was 60, and CRP was elevated. He had an MRI performed showing inflammation but no infection. He was then treated with steroids and a medrol dose pack. The pt then had a repeat MRI last night showing osteomyelitis and discitis. There are no other complaints, changes, or physical findings at this time. PCP: Jairon Lowry MD    Current Facility-Administered Medications on File Prior to Encounter   Medication Dose Route Frequency Provider Last Rate Last Dose    [COMPLETED] gadoterate meglumine (DOTAREM) 0.5 mmol/mL (376.9 mg/mL) contrast solution 20 mL  20 mL IntraVENous RAD ONCE Jairon Lowry MD   17 mL at 01/10/19 5618     Current Outpatient Medications on File Prior to Encounter   Medication Sig Dispense Refill    meloxicam (MOBIC) 15 mg tablet Take 15 mg by mouth daily.  omeprazole (PRILOSEC) 20 mg capsule Take 20 mg by mouth daily.  oxyCODONE-acetaminophen (PERCOCET) 5-325 mg per tablet 1-2 tabs po at bedtime. 15 Tab 0    aspirin delayed-release 81 mg tablet Take 1 Tab by mouth daily.  May restart on 12/01      senna-docusate (Angela Pretty) 8.6-50 mg per tablet Take 1 Tab by mouth daily. 30 Tab 0    metoprolol succinate (TOPROL-XL) 100 mg tablet 1/2 to 1 tab daily as directed (Patient taking differently: Take 50 mg by mouth daily. 1/2 to 1 tab daily as directed) 90 Tab 1    cholecalciferol (VITAMIN D3) 1,000 unit tablet Take 2 Tabs by mouth daily.  albuterol (PROVENTIL HFA, VENTOLIN HFA, PROAIR HFA) 90 mcg/actuation inhaler Take 1 Puff by inhalation every six (6) hours as needed for Wheezing. Indications: BRONCHOSPASM PREVENTION 1 Inhaler 11       Past History     Past Medical History:  Past Medical History:   Diagnosis Date    Asthma, mild intermittent     Family history of prostate cancer     Grandfather    Foot drop, left     Intermittent atrial fibrillation ([address] 1/16/2017    Left hydrocele     Lumbar herniated disc     Personal history of colonic polyps     Negative colonoscopy 2015    Vitamin D deficiency        Past Surgical History:  Past Surgical History:   Procedure Laterality Date    COLONOSCOPY N/A 6/15/2018    COLONOSCOPY performed by Vinnie Reyes MD at [facility] ENDOSCOPY    COLORECTAL SCRN; HI RISK IND  6/15/2018         HX COLONOSCOPY  06/11/2015    HX ORTHOPAEDIC      left knee surgery meniscectomy repair    HX UROLOGICAL  2018    hydrocele repair       Family History:  Family History   Problem Relation Age of Onset    Cancer Mother         breast    Cancer Father         colon       Social History:  Social History     Tobacco Use    Smoking status: Never Smoker    Smokeless tobacco: Never Used   Substance Use Topics    Alcohol use: Yes     Alcohol/week: 1.2 oz     Types: 2 Glasses of wine per week     Comment: 2 glasses aaron daily    Drug use: No       Allergies:  No Known Allergies      Review of Systems   Review of Systems   Constitutional: Positive for chills and fever. Negative for fatigue. HENT: Negative for congestion, ear pain and rhinorrhea. Eyes: Negative for pain and visual disturbance. Respiratory: Negative for cough and shortness of breath. Cardiovascular: Negative for chest pain and leg swelling. Gastrointestinal: Negative for abdominal pain, diarrhea, nausea and vomiting. Genitourinary: Negative for dysuria and flank pain. Musculoskeletal: Positive for back pain. Negative for neck pain. Skin: Negative for rash and wound. Neurological: Negative for dizziness, syncope and headaches. Psychiatric/Behavioral: Negative for self-injury and suicidal ideas. Physical Exam   Physical Exam     GENERAL: alert and oriented, no acute distress  EYES: PEERL, No injection, discharge or icterus. HENT: Mucous membranes pink and moist.  NECK: Supple  LUNGS: Airway patent. Non-labored respirations. Breath sounds clear with good air entry bilaterally. HEART: Regular rate and rhythm. No peripheral edema  ABDOMEN: Non-distended and non-tender, without guarding or rebound. SKIN:  warm, dry  MSK/ EXTREMITIES: Without swelling or deformity, symmetric with normal ROM, lumbar midline TTP  NEUROLOGICAL: Alert, oriented    Diagnostic Study Results     Labs - No results found for this or any previous visit (from the past 12 hour(s)). Radiologic Studies -   No orders to display     Medical Decision Making   I am the first provider for this patient. I reviewed the vital signs, available nursing notes, past medical history, past surgical history, family history and social history. Vital Signs-Reviewed the patient's vital signs. Patient Vitals for the past 12 hrs:   Temp Pulse Resp BP SpO2   01/11/19 1159 97.3 °F (36.3 °C) 75 16 132/78 98 %     Records Reviewed: Nursing Notes, Old Medical Records, Previous Radiology Studies and Previous Laboratory Studies    Provider Notes (Medical Decision Making): On presentation the patient is well appearing, in no acute distress with reassurnig vital signs. Pt presents with osteomyelitis of the lumbar spine.  Will discuss case with neurosurgery and admit for further management. ED Course:   Initial assessment performed. The patients presenting problems have been discussed, and they are in agreement with the care plan formulated and outlined with them. I have encouraged them to ask questions as they arise throughout their visit. PROGRESS NOTE:  12:49 PM  Pt declines pain medication. CONSULT NOTE:  1:18 PM  Tobias Smith MD spoke with Dr. Yojana Land,  Specialty: Neurosurgery  Discussed patient's hx, disposition, and available diagnostic and imaging results. Reviewed care plans. Consultant agrees with plans as outlined. Consultant recommended admission to the hospitalist to discuss infectious disease and IR for needle guided biopsy. CONSULT NOTE:   1:29 PM  Tobias Smith MD spoke with Dr. Dequan Mari,   Specialty: Hospitalist  Discussed pt's hx, disposition, and available diagnostic and imaging results. Reviewed care plans. Consultant will evaluate pt for admission. Written by Julian Horn, ED Scribe, as dictated by Stella Smith MD.    CONSULT NOTE:  1:43 PM  Singh Dove. Carmelo Smith MD spoke with Dr. Ysabel Pickering,  Specialty: IR  Discussed patient's hx, disposition, and available diagnostic and imaging results. Reviewed care plans. Consultant agrees with plans as outlined. Consultant will perform biopsy. Critical Care Time:   0    Disposition:  PLAN:  1. Admit    ADMIT NOTE:  1:29 PM  Patient is being admitted to the hospital by Dr. Dequan Mari. The results of their tests and reasons for their admission have been discussed with them and/or available family. They convey agreement and understanding for the need to be admitted and for their admission diagnosis. Consultation has been made with the inpatient physician specialist for hospitalization. Diagnosis     Clinical Impression:   1. Osteomyelitis     Attestations: This note is prepared by Julian Horn, acting as Scribe for Singh Dove. Carmelo Smith MD.    Singh Dove. Amelia Gaviria MD: The scribe's documentation has been prepared under my direction and personally reviewed by me in its entirety. I confirm that the note above accurately reflects all work, treatment, procedures, and medical decision making performed by me. This note will not be viewable in [address]

## 2019-01-11 NOTE — ROUTINE PROCESS
TRANSFER - OUT REPORT:    Verbal report given to Robin Mccaluey RN (name) on Gustavo Priest  being transferred to [place] [id number] [phone] (unit) for routine progression of care       Report consisted of patients Situation, Background, Assessment and   Recommendations(SBAR). Information from the following report(s) SBAR, Kardex, [facility] and Recent Results was reviewed with the receiving nurse. Lines:   Peripheral IV 01/11/19 Right Hand (Active)   Site Assessment Clean, dry, & intact 1/11/2019  1:55 PM   Phlebitis Assessment 0 1/11/2019  1:55 PM   Infiltration Assessment 0 1/11/2019  1:55 PM   Dressing Status Clean, dry, & intact 1/11/2019  1:55 PM   Hub Color/Line Status Pink 1/11/2019  1:55 PM        Opportunity for questions and clarification was provided.       Patient transported with:

## 2019-01-12 LAB
ANION GAP SERPL CALC-SCNC: 5 MMOL/L (ref 5–15)
BASOPHILS # BLD: 0 K/UL (ref 0–0.1)
BASOPHILS NFR BLD: 1 % (ref 0–1)
BUN SERPL-MCNC: 16 MG/DL (ref 6–20)
BUN/CREAT SERPL: 20 (ref 12–20)
CALCIUM SERPL-MCNC: 8 MG/DL (ref 8.5–10.1)
CHLORIDE SERPL-SCNC: 103 MMOL/L (ref 97–108)
CO2 SERPL-SCNC: 30 MMOL/L (ref 21–32)
CREAT SERPL-MCNC: 0.79 MG/DL (ref 0.7–1.3)
DATE LAST DOSE: ABNORMAL
DIFFERENTIAL METHOD BLD: ABNORMAL
EOSINOPHIL # BLD: 0.1 K/UL (ref 0–0.4)
EOSINOPHIL NFR BLD: 1 % (ref 0–7)
ERYTHROCYTE [DISTWIDTH] IN BLOOD BY AUTOMATED COUNT: 11.5 % (ref 11.5–14.5)
GLUCOSE SERPL-MCNC: 94 MG/DL (ref 65–100)
HCT VFR BLD AUTO: 36.2 % (ref 36.6–50.3)
HGB BLD-MCNC: 12.4 G/DL (ref 12.1–17)
IMM GRANULOCYTES # BLD AUTO: 0 K/UL (ref 0–0.04)
IMM GRANULOCYTES NFR BLD AUTO: 0 % (ref 0–0.5)
LYMPHOCYTES # BLD: 1.8 K/UL (ref 0.8–3.5)
LYMPHOCYTES NFR BLD: 25 % (ref 12–49)
MCH RBC QN AUTO: 31.9 PG (ref 26–34)
MCHC RBC AUTO-ENTMCNC: 34.3 G/DL (ref 30–36.5)
MCV RBC AUTO: 93.1 FL (ref 80–99)
MONOCYTES # BLD: 0.7 K/UL (ref 0–1)
MONOCYTES NFR BLD: 10 % (ref 5–13)
NEUTS SEG # BLD: 4.5 K/UL (ref 1.8–8)
NEUTS SEG NFR BLD: 63 % (ref 32–75)
NRBC # BLD: 0 K/UL (ref 0–0.01)
NRBC BLD-RTO: 0 PER 100 WBC
PLATELET # BLD AUTO: 251 K/UL (ref 150–400)
PMV BLD AUTO: 9.1 FL (ref 8.9–12.9)
POTASSIUM SERPL-SCNC: 4.2 MMOL/L (ref 3.5–5.1)
RBC # BLD AUTO: 3.89 M/UL (ref 4.1–5.7)
REPORTED DOSE,DOSE: ABNORMAL UNITS
REPORTED DOSE/TIME,TMG: ABNORMAL
SODIUM SERPL-SCNC: 138 MMOL/L (ref 136–145)
VANCOMYCIN TROUGH SERPL-MCNC: 15.9 UG/ML (ref 5–10)
WBC # BLD AUTO: 7.1 K/UL (ref 4.1–11.1)

## 2019-01-12 PROCEDURE — 74011250637 HC RX REV CODE- 250/637: Performed by: INTERNAL MEDICINE

## 2019-01-12 PROCEDURE — 80048 BASIC METABOLIC PNL TOTAL CA: CPT

## 2019-01-12 PROCEDURE — 85025 COMPLETE CBC W/AUTO DIFF WBC: CPT

## 2019-01-12 PROCEDURE — 36415 COLL VENOUS BLD VENIPUNCTURE: CPT

## 2019-01-12 PROCEDURE — 74011250636 HC RX REV CODE- 250/636: Performed by: INTERNAL MEDICINE

## 2019-01-12 PROCEDURE — 65270000029 HC RM PRIVATE

## 2019-01-12 PROCEDURE — 80202 ASSAY OF VANCOMYCIN: CPT

## 2019-01-12 PROCEDURE — 74011000258 HC RX REV CODE- 258: Performed by: INTERNAL MEDICINE

## 2019-01-12 RX ORDER — VANCOMYCIN HYDROCHLORIDE
1250 EVERY 8 HOURS
Status: DISCONTINUED | OUTPATIENT
Start: 2019-01-13 | End: 2019-01-14 | Stop reason: HOSPADM

## 2019-01-12 RX ADMIN — VANCOMYCIN HYDROCHLORIDE 1000 MG: 1 INJECTION, POWDER, LYOPHILIZED, FOR SOLUTION INTRAVENOUS at 05:07

## 2019-01-12 RX ADMIN — Medication 10 ML: at 20:47

## 2019-01-12 RX ADMIN — Medication 10 ML: at 05:11

## 2019-01-12 RX ADMIN — CEFEPIME HYDROCHLORIDE 2 G: 2 INJECTION, POWDER, FOR SOLUTION INTRAVENOUS at 04:23

## 2019-01-12 RX ADMIN — VANCOMYCIN HYDROCHLORIDE 1000 MG: 1 INJECTION, POWDER, LYOPHILIZED, FOR SOLUTION INTRAVENOUS at 20:46

## 2019-01-12 RX ADMIN — Medication 10 ML: at 13:40

## 2019-01-12 RX ADMIN — ACETAMINOPHEN 650 MG: 325 TABLET ORAL at 09:55

## 2019-01-12 RX ADMIN — DOCUSATE SODIUM 100 MG: 100 CAPSULE, LIQUID FILLED ORAL at 04:12

## 2019-01-12 RX ADMIN — OXYCODONE AND ACETAMINOPHEN 1 TABLET: 5; 325 TABLET ORAL at 04:12

## 2019-01-12 RX ADMIN — VANCOMYCIN HYDROCHLORIDE 1000 MG: 1 INJECTION, POWDER, LYOPHILIZED, FOR SOLUTION INTRAVENOUS at 15:15

## 2019-01-12 RX ADMIN — METOPROLOL SUCCINATE 25 MG: 25 TABLET, EXTENDED RELEASE ORAL at 09:55

## 2019-01-12 RX ADMIN — CEFEPIME HYDROCHLORIDE 2 G: 2 INJECTION, POWDER, FOR SOLUTION INTRAVENOUS at 13:39

## 2019-01-12 RX ADMIN — STANDARDIZED SENNA CONCENTRATE AND DOCUSATE SODIUM 1 TABLET: 8.6; 5 TABLET, FILM COATED ORAL at 09:56

## 2019-01-12 RX ADMIN — Medication 10 ML: at 04:25

## 2019-01-12 RX ADMIN — VITAMIN D, TAB 1000IU (100/BT) 2000 UNITS: 25 TAB at 09:55

## 2019-01-12 RX ADMIN — OXYCODONE AND ACETAMINOPHEN 1 TABLET: 5; 325 TABLET ORAL at 18:35

## 2019-01-12 RX ADMIN — ACETAMINOPHEN 650 MG: 325 TABLET ORAL at 15:24

## 2019-01-12 RX ADMIN — CEFEPIME HYDROCHLORIDE 2 G: 2 INJECTION, POWDER, FOR SOLUTION INTRAVENOUS at 20:05

## 2019-01-12 NOTE — PROGRESS NOTES
Reason for Admission: Acute osteomyelitis of L4-L5 discitis in the setting of  Recent L4 laminectomy                     RRAT Score:     6                Plan for utilizing home health:      TBD                    Likelihood of Readmission:  Low                         Transition of Care Plan:  Patient has been admitted due to osteomyelitis of L4-L5 discitis. Upon interview patient report full independence and drives. Demographics, PCP and NOK verified. ID consult is pending and CM to follow hospital course should IV ABX bee needed at discharge. Care Management Interventions  PCP Verified by CM: Yes  MyChart Signup: No  Discharge Durable Medical Equipment: No  Physical Therapy Consult: No  Occupational Therapy Consult: No  Speech Therapy Consult: No  Current Support Network: Lives with Spouse  Confirm Follow Up Transport: Family  Plan discussed with Pt/Family/Caregiver: Yes  Freedom of Choice Offered:  Yes

## 2019-01-12 NOTE — PROGRESS NOTES
Problem: Falls - Risk of  Goal: *Absence of Falls  Document Jie Fall Risk and appropriate interventions in the flowsheet.   Outcome: Progressing Towards Goal  Fall Risk Interventions:  Mobility Interventions: Communicate number of staff needed for ambulation/transfer, Patient to call before getting OOB, PT Consult for mobility concerns, PT Consult for assist device competence, Utilize walker, cane, or other assistive device         Medication Interventions: Patient to call before getting OOB, Teach patient to arise slowly    Elimination Interventions: Call light in reach, Patient to call for help with toileting needs, Toileting schedule/hourly rounds

## 2019-01-12 NOTE — PROGRESS NOTES
3949 Dr. Corley Post with infectious disease states she will see the patient today. PICC team nurse states they will be available today and Monday after infectious disease assess the patient and the appropriate orders are placed. 1332 Patient states he hasn't been in Afib in 5 years and would like to be removed from telemetry. Dr. Orin Juarez states it is okay to discontinue the patient's telemetry monitoring.

## 2019-01-12 NOTE — CONSULTS
Andrew Rashid  MR#: [id number]  : 1960  ACCOUNT #: [de-identified]   DATE OF SERVICE: 2019    CHIEF COMPLAINT:  A 59-year-old man with low-grade temperature and back pain status post lumbar laminectomy, diskectomy at L4-L5 recently for a large disk herniation and foot drop. HISTORY OF PRESENT ILLNESS:  The patient is well known to me. I operated on him several weeks ago for a large disk herniation at L5, who presented with a near foot drop on the left side and numbness in the foot and leg as well and significant amount of pain. It was my opinion that he came to medical attention late because he wished to avoid any surgical intervention. 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 After discussing risks, benefits and alternatives of surgery with him though he did wish to proceed with surgery and he had an uneventful surgery and postoperative recovery after removal of a large disk herniation. In fact, his symptoms were improving and he was very anxious to return to work, and in my opinion may have return to work a little too soon because shortly thereafter, he began complaining of recurrent low back pain, leg pain and difficulty walking and began walking with a cane although as he followed up in the office, it was evident that the dorsiflexion weakness was significantly improving over time. As of today his dorsiflexion weakness is nearly completely gone, has 4/5 strength including extensor hallucis longus on the left side and the numbness that he is almost completely gone as well. He has good sensation in the L5 distribution. Straight leg raising is unlimited bilaterally. The incision is completely healed. ALLERGIES:  HE HAS NO KNOWN DRUG ALLERGIES.       I tried him on several medications including some steroids, which he says has benefited him more than the pain medication, but when he presented to his primary care doctor, Dr. Patric Lefort, stating he was having some low-grade fevers, he sent him for a followup MRI which was believed a second MRI after surgery. The radiologist unfortunately reported it as cannot rule out diskitis and/or osteomyelitis. He has been started on some Vancomycin. REVIEW OF SYSTEMS:  Otherwise noncontributory. White cell his white count was 8.3 when he came in, 7.1 today. Sed rate was elevated, although I would not be surprised in somebody who recently had surgery, even C-reactive protein can be elevated after surgery. On examination today, he is awake and alert. He looks and feels well. He certainly does not appear to be in any acute distress. He is sitting up in the bed reading. He admits, and I note on exam that the dorsiflexion weakness preoperatively is nearly completely gone, and the sensation is nearly completely normal in the left foot as well. He has no other focal motor or sensory deficits. Incision is completely healed. A Gram stain is negative to date. No organisms seen rare WBCs. He did undergo a needle biopsy of the disk space yesterday and obviously culture is pending. It is my opinion that he may have just returned to work too soon and increased his activity too soon and set himself back in terms of his recovery. Currently does not appear to be a patient who has typical diskitis as he really is not in any acute pain. He moves all extremities equally. He is not curled up in a fetal position like a patient with acute osteomyelitis or diskitis would be, and the MRI in my opinion shows the typical postoperative findings in somebody who would just have undergone a lumbar laminectomy and diskectomy. I discussed this with Dr. Yeison Dowell earlier today. I will defer antibiotic use to the hospitalist and infectious disease, but it would be my preference not to send him home on antibiotics if there is no evidence of infection.   Patient has the personality in my opinion that tends to treat himself. I have discussed with him politely that it is best if he lets us manage this and he is in agreement with that. I will follow with you, but at this point I am not inclined to take him back to the operating room. I think that would set him back even further and the likelihood of finding anything adequate to treat would be very low. Again, I will follow with you.       MD KVNG Murrieta / KAEL  D: 01/12/2019 08:31     T: 01/12/2019 09:11  JOB #: [id number]  CC: Emma Paulino MD

## 2019-01-12 NOTE — PROGRESS NOTES
Hospitalist Progress Note    NAME: Maggie Aragon   :  1960   MRN:  [id number]       Assessment / Plan:  Acute osteomyelitis of L4-L5 discitis in the setting of  Recent L4 laminectomy  He is going for bone biopsy right now  We will start empiric vancomycin and cefepime after bone biopsy is complete  Send bone tissue and also synovial fluid for culture and Gram stain  Neurosurgery Dr. Renuka Bryan has been consulted  Consult infectious disease  Start Percocet for pain control  Keep n.p.o.  Start diet after bone biopsy is complete    :  NSG input noted - Dr. Dagmar Hashimoto does not believe there is an active infection on going and does not recommend long term abx. Will await ID input. In the interim, continue to follow Cx and results of biopsy. Continue IV Vanco and Cefepime. History of atrial fibrillation with controlled rate paroxysmal  Reports he has been sinus rhythm in the last 5 years  On metoprolol for rate control    History of asthma stable  On PRN albuterol       25.0 - 29.9 Overweight / Body mass index is 26.88 kg/m². Code status: Full  Prophylaxis: SCD's  Recommended Disposition:  PT, OT, RN     Subjective:     Chief Complaint / Reason for Physician Visit  Complaining of lower back pain but states that he has his first night of sleep in a week without chills and sweats. Discussed with RN events overnight. Review of Systems:  Symptom Y/N Comments  Symptom Y/N Comments   Fever/Chills y   Chest Pain n    Poor Appetite    Edema     Cough n   Abdominal Pain n    Sputum    Joint Pain     SOB/AGUILLON n   Pruritis/Rash     Nausea/vomit n   Tolerating PT/OT     Diarrhea    Tolerating Diet y    Constipation    Other       Could NOT obtain due to:      Objective:     VITALS:   Last 24hrs VS reviewed since prior progress note.  Most recent are:  Patient Vitals for the past 24 hrs:   Temp Pulse Resp BP SpO2   19 0411 98.5 °F (36.9 °C) 65 18 128/82 97 %   19 2343 98.7 °F (37.1 °C) 72 18 106/59 96 %   01/11/19 1854 98.2 °F (36.8 °C) 66 16 127/74 98 %   01/11/19 1700 -- 68 15 132/76 97 %   01/11/19 1655 -- 70 14 104/58 95 %   01/11/19 1650 -- 70 13 110/64 95 %   01/11/19 1645 -- 73 12 113/69 98 %   01/11/19 1640 -- 84 16 128/69 99 %   01/11/19 1635 -- 84 15 125/74 --   01/11/19 1630 -- 75 12 128/71 --   01/11/19 1620 -- 84 13 112/84 --   01/11/19 1518 98.4 °F (36.9 °C) 81 16 170/68 --   01/11/19 1515 -- 71 16 138/81 100 %   01/11/19 1500 -- -- -- 133/73 99 %   01/11/19 1400 -- -- -- 127/83 99 %   01/11/19 1159 97.3 °F (36.3 °C) 75 16 132/78 98 %       Intake/Output Summary (Last 24 hours) at 1/12/2019 0858  Last data filed at 1/12/2019 0030  Gross per 24 hour   Intake 600 ml   Output --   Net 600 ml        PHYSICAL EXAM:  General: Alert, cooperative, no acute distress    EENT:  EOMI. Anicteric sclerae. MMM  Resp:  CTA bilaterally, no wheezing or rales. No accessory muscle use  CV:  Regular  rhythm,  No edema  GI:  Soft, Non distended, Non tender.  +Bowel sounds  Neurologic:  Alert and oriented X 3, normal speech,   Psych:   Good insight. Not anxious nor agitated  Skin:  No rashes. No jaundice  Back:  Healed vertical scar lumbar spine, no point tenderness appreciated    Reviewed most current lab test results and cultures  YES  Reviewed most current radiology test results   YES  Review and summation of old records today    NO  Reviewed patient's current orders and MAR    YES  PMH/SH reviewed - no change compared to H&P  ________________________________________________________________________  Care Plan discussed with:    Comments   Patient x    Family      RN x    Care Manager     Consultant                        Multidiciplinary team rounds were held today with , nursing, pharmacist and clinical coordinator. Patient's plan of care was discussed; medications were reviewed and discharge planning was addressed. ________________________________________________________________________  Total NON critical care TIME:  35   Minutes    Total CRITICAL CARE TIME Spent:   Minutes non procedure based      Comments   >50% of visit spent in counseling and coordination of care     ________________________________________________________________________  Morenita Richardson MD     Procedures: see electronic medical records for all procedures/Xrays and details which were not copied into this note but were reviewed prior to creation of Plan. LABS:  I reviewed today's most current labs and imaging studies.   Pertinent labs include:  Recent Labs     01/12/19  0420 01/11/19  1354   WBC 7.1 8.3   HGB 12.4 13.8   HCT 36.2* 40.6    271     Recent Labs     01/12/19  0420 01/11/19  1354    138   K 4.2 4.1    103   CO2 30 27   GLU 94 107*   BUN 16 17   CREA 0.79 0.89   CA 8.0* 8.4*       Signed: Morenita Richardson MD

## 2019-01-12 NOTE — PROGRESS NOTES
See full consult  I've donnie following closely since surgery  He has a tendency to treat himself and in my opinion came to medical attention late , prior to surgery and as a result, presented with a neurological deficit , foot drop, pre-op. He admits today that the pre-op neuro deficits including the weakness of the left foot and numbness are significantly better than pre op and in fact he is walking 2 miles a day immediately prior to yesterday's admission  In my opinion, someone with acute discitis or osteomyelitis can not walk 2 miles a day. The incision is well healed, he appears comfortable , conversant, in no acute pain or distress. I think he went back to work too soon and was too aggressive in his recovery activity.  Will follow with you, the MRI looks to me like someone who had surgery several weeks ago, will defer to ID, but if no evidence of infection, would not put on long term antibiotics

## 2019-01-12 NOTE — PROGRESS NOTES
Bedside shift change report given to [address] [place] [address] Road (oncoming nurse) by Lucy Randall RN (offgoing nurse). Report included the following information SBAR, Kardex, ED Summary, Intake/Output, MAR, Recent Results and Cardiac Rhythm NSR.

## 2019-01-12 NOTE — CONSULTS
ID    Pt seen & examined. Labs & imaging reviewed   Orders placed. Pt case D/w patient's PCP  Dr Betty Terry at length    Infectious Disease Consult  Bran Membreno MD FACP    Date of Consultation:  January 12,2019    Referring Physician:  Dr Terrell Herrera:   Patient is a 59 y.o. male who is being seen for - Osteomyelitis / Discitis. IMPRESSION:   · L4/5 osteomyelitis /discitis on MRI lumbar spine 1/10. Small fluid collections in R/para spinal musculature at level of L4 & in s/cutaneous soft tissue overlying spinous processes extending from L4 to S1 not significantly changed in size from previous MRI . Thicker rind of peripheral enhancement L4- S1 compared to previous MRI raising suspicion for infectious process ,  but no erosive change ,no prevertebral or epidural  abscess formation, ? Post surgical vs infectious etiology . D/w Dr Richi Acuna  · H/o Lumbar laminectomy/ discectomy on 11/28 /18 followed 3 weeks later by low grade temps, exacerbation of low back pain. · MRI done 12/28 non specific new edema L4/5 disc space with no obvious erosive change , no disc enhancement representing post surgical change rather than infection. 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 Pt  Reports doing well after Surgery & being able to walk well with resolution of foot drop . Approximately three weeks after surgery he started to have  symptoms of a low grade fever ranging from 100-100.5 and chills, also worsening back pain. He had to start using cane again . He notes his CBC has been  normal, ESR was  was 60,  CRP was elevated at 73.22on 12/28 , ESR 55 on 1/8 , 48 on 1/11  . He had an MRI performedon 12/28 showing inflammation but no infection. MRI -12/28/18  IMPRESSION:    Probable free fragment at the L4-5 disc space as described resulting in a  relative right-sided neural foraminal stenosis at the entrance to the foramen. Nonspecific new edema at the L4-5 disc space, without an obvious erosive change  of the cortex or significant disc enhancement. This more likely represents  postsurgical change than infection    He was then treated with a medrol dose pack. While on steroid pack he was symptom free , fever also resolved . But on completion of  medication he had recurrence of low grade fever & also back pain. Pt describes fever as bein mostly at night   . Pt had seen his PCP Dr Dre Sanders & was referred for MRI . MRI was done on 1/10/19     MRI -1/10/19   IMPRESSION:    1. Findings consistent with L4-L5 osteomyelitis-discitis. No organized epidural  or prevertebral abscess. 2. Postsurgical changes of bilateral L4 laminectomy, with enhancing granulation  tissue as described above, including extending into the right posterior epidural  space at the level of L4-L5.  Small fluid collections in the right paraspinal  musculature at the level of L4, and in the subcutaneous soft tissues overlying  the spinous processes extending from L4 through S1 are not significantly changed  in size, but appear to demonstrate a thicker rind of peripheral enhancement when  compared to prior examination, raising suspicion for possible infection/abscess. Sterility is indeterminate by imaging. 3. Degenerative changes as detailed above. 4. A 13 mm right adrenal nodule, not included in field of view on prior exam. In  the absence of known malignancy, this statistically represents a benign adenoma. This can be definitively characterized with CT adrenal protocol as clinically  Indicated. Pt thereafter referred to ED for admission . He had  aspiration  of fluid done by IR & sent for culture , pathology on 1/11 . He was started on Vancomycin / Cefepime IV    Pt seen today . He feels a little better. No fever. Back pain is better. Pt s/p L/spermatocele surgery Oct 2018 , no post op complications. Pt is a long distance bike rider. Physically active . Has a [place] track that he works out on. Pt gives h/o being exposed to Histoplasmosis 7 years ago after mission trip to [place] . Symptoms resolved spontaneously .   Patient Active Problem List   Diagnosis Code    Intermittent atrial fibrillation (HCC) I48.0    Vitamin D deficiency E55.9    Personal history of colonic polyps Z86.010    Herniated lumbar intervertebral disc M51.26    S/P lumbar laminectomy Z98.890    Acute osteomyelitis of spine ([address] M46.20     Past Medical History:   Diagnosis Date    Asthma, mild intermittent     Family history of prostate cancer     Grandfather    Foot drop, left     Intermittent atrial fibrillation ([address] 1/16/2017    Left hydrocele     Lumbar herniated disc     Personal history of colonic polyps     Negative colonoscopy 2015    Vitamin D deficiency       Family History   Problem Relation Age of Onset    Cancer Mother         breast    Cancer Father         colon      Social History     Tobacco Use    Smoking status: Never Smoker    Smokeless tobacco: Never Used   Substance Use Topics    Alcohol use: Yes     Alcohol/week: 1.2 oz     Types: 2 Glasses of wine per week     Comment: 2 glasses aaron daily     Past Surgical History:   Procedure Laterality Date    COLONOSCOPY N/A 6/15/2018    COLONOSCOPY performed by Abad Day MD at [facility] ENDOSCOPY   [facility] SCRN; HI RISK IND  6/15/2018         HX COLONOSCOPY  06/11/2015    HX ORTHOPAEDIC      left knee surgery meniscectomy repair    HX UROLOGICAL  2018    hydrocele repair      Prior to Admission medications    Medication Sig Start Date End Date Taking? Authorizing Provider   meloxicam (MOBIC) 15 mg tablet Take 15 mg by mouth daily. Yes Provider, Historical   oxyCODONE-acetaminophen (PERCOCET) 5-325 mg per tablet 1-2 tabs po at bedtime. 12/27/18  Yes Janine Lemus PA-C   aspirin delayed-release 81 mg tablet Take 1 Tab by mouth daily. May restart on 12/01 12/1/18  Yes Cali Groves NP   omeprazole (PRILOSEC) 20 mg capsule Take 20 mg by mouth daily. Provider, Historical   senna-docusate (PERICOLACE) 8.6-50 mg per tablet Take 1 Tab by mouth daily. 11/29/18   Cali Groves NP   metoprolol succinate (TOPROL-XL) 100 mg tablet 1/2 to 1 tab daily as directed  Patient taking differently: Take 50 mg by mouth daily. 1/2 to 1 tab daily as directed 7/14/18   Pati Morales MD   cholecalciferol (VITAMIN D3) 1,000 unit tablet Take 2 Tabs by mouth daily. 5/10/17   Pati Morales MD   albuterol (PROVENTIL HFA, VENTOLIN HFA, PROAIR HFA) 90 mcg/actuation inhaler Take 1 Puff by inhalation every six (6) hours as needed for Wheezing. Indications: BRONCHOSPASM PREVENTION 5/10/17   Pati Morales MD     No Known Allergies     Review of Systems:  A comprehensive review of systems was negative except for that written in the History of Present Illness. 10 point review of systems obtained . All other systems negative    Objective:   Blood pressure 128/77, pulse 66, temperature 98.2 °F (36.8 °C), resp.  rate 16, height 6' (1.829 m), weight 198 lb 3.1 oz (89.9 kg), SpO2 96 %. Temp (24hrs), Av.6 °F (37 °C), Min:98.2 °F (36.8 °C), Max:99 °F (37.2 °C)    Current Facility-Administered Medications   Medication Dose Route Frequency    vancomycin (VANCOCIN) 1250 mg in  ml infusion  1,250 mg IntraVENous Q8H    albuterol (PROVENTIL HFA, VENTOLIN HFA, PROAIR HFA) inhaler 1 Puff  1 Puff Inhalation Q6H PRN    cholecalciferol (VITAMIN D3) tablet 2,000 Units  2,000 Units Oral DAILY    metoprolol succinate (TOPROL-XL) XL tablet 25 mg  25 mg Oral DAILY    pantoprazole (PROTONIX) tablet 40 mg  40 mg Oral ACB    oxyCODONE-acetaminophen (PERCOCET) 5-325 mg per tablet 1 Tab  1 Tab Oral Q8H PRN    senna-docusate (PERICOLACE) 8.6-50 mg per tablet 1 Tab  1 Tab Oral DAILY    sodium chloride (NS) flush 5-40 mL  5-40 mL IntraVENous Q8H    sodium chloride (NS) flush 5-40 mL  5-40 mL IntraVENous PRN    acetaminophen (TYLENOL) tablet 650 mg  650 mg Oral Q6H PRN    ondansetron (ZOFRAN) injection 4 mg  4 mg IntraVENous Q6H PRN    docusate sodium (COLACE) capsule 100 mg  100 mg Oral DAILY PRN    cefepime (MAXIPIME) 2 g in 0.9% sodium chloride (MBP/ADV) 100 mL  2 g IntraVENous Q8H        Exam:    General:  Alert, cooperative,    Eyes:  Sclera anicteric. Pupils equally round and reactive to light. Mouth/Throat: Mucous membranes normal, oral pharynx clear  Dentition - fair   Neck: Supple   Lungs:   Clear to auscultation bilaterallyt   CV:  Regular rate and rhythm,no murmur, click, rub or gallop   Abdomen:   Soft, non-tender.  bowel sounds normal. non-distended   Extremities: No  edema   Skin: Skin color, texture, turgor normal. no acute rash or lesions   Lymph nodes: Cervical and supraclavicular normal   Musculoskeletal: No swelling or deformity, lower back - healed linear scar , nontender    Lines/Devices:  Intact, no erythema, drainage or tenderness   Psych: Alert and oriented, normal mood affect        Data Review:   CBC:   Recent Labs 01/12/19  0420 01/11/19  1354   WBC 7.1 8.3   RBC 3.89* 4.39   HGB 12.4 13.8   HCT 36.2* 40.6    271   GRANS 63 75   LYMPH 25 15   EOS 1 0     CMP:   Recent Labs     01/12/19  0420 01/11/19  1354   GLU 94 107*    138   K 4.2 4.1    103   CO2 30 27   BUN 16 17   CREA 0.79 0.89   CA 8.0* 8.4*   AGAP 5 8   BUCR 20 19       Lab Results   Component Value Date/Time    Culture result: Culture performed on Fluid swab specimen 01/11/2019 04:40 PM    Culture result: NO GROWTH 1 DAY 01/11/2019 04:40 PM    Culture result: NO GROWTH 4 DAYS 01/08/2019 11:24 AM    Culture result: NO GROWTH 4 DAYS 01/08/2019 11:20 AM    Culture result: MRSA NOT PRESENT 11/27/2018 08:49 AM    Culture result:  11/27/2018 08:49 AM         Screening of patient nares for MRSA is for surveillance purposes and, if positive, to facilitate isolation considerations in high risk settings. It is not intended for automatic decolonization interventions per se as regimens are not sufficiently effective to warrant routine use. XR Results (most recent):  Results from Hospital Encounter encounter on 11/28/18   XR SPINE SNGL V (CROSS TABLE LAT)    Narrative EXAM:  XR SPINE SNGL V (CROSS TABLE LAT)    INDICATION:    L4-L5 Laminectomy Discectomy    Portable fluoroscopy was utilized during lumbar spine surgery with a single  lateral view indication lumbosacral junction obtained. Impression IMPRESSION:  Portable fluoroscopy utilized during surgery. REPORT PROVIDED FOR COMPLIANCE ONLY AT NO CHARGE                           ICD-10-CM ICD-9-CM    1. Osteomyelitis, unspecified site, unspecified type ([facility] M86.9 730.20            Antibiotic History  Vancomycin / Cefepime - 1/12  I have discussed the diagnosis with the patient and the intended plan as seen in the above orders. I have discussed medication side effects and warnings with the patient as well.     Reviewed test results at length with patient    Signed By: Amador Saavedra MD LESTER

## 2019-01-13 LAB
ANION GAP SERPL CALC-SCNC: 5 MMOL/L (ref 5–15)
BASOPHILS # BLD: 0 K/UL (ref 0–0.1)
BASOPHILS NFR BLD: 1 % (ref 0–1)
BUN SERPL-MCNC: 13 MG/DL (ref 6–20)
BUN/CREAT SERPL: 15 (ref 12–20)
CALCIUM SERPL-MCNC: 8.2 MG/DL (ref 8.5–10.1)
CHLORIDE SERPL-SCNC: 105 MMOL/L (ref 97–108)
CO2 SERPL-SCNC: 28 MMOL/L (ref 21–32)
CREAT SERPL-MCNC: 0.85 MG/DL (ref 0.7–1.3)
DIFFERENTIAL METHOD BLD: ABNORMAL
EOSINOPHIL # BLD: 0.1 K/UL (ref 0–0.4)
EOSINOPHIL NFR BLD: 2 % (ref 0–7)
ERYTHROCYTE [DISTWIDTH] IN BLOOD BY AUTOMATED COUNT: 11.5 % (ref 11.5–14.5)
GLUCOSE SERPL-MCNC: 100 MG/DL (ref 65–100)
HCT VFR BLD AUTO: 35.4 % (ref 36.6–50.3)
HGB BLD-MCNC: 11.7 G/DL (ref 12.1–17)
IMM GRANULOCYTES # BLD AUTO: 0 K/UL (ref 0–0.04)
IMM GRANULOCYTES NFR BLD AUTO: 1 % (ref 0–0.5)
LYMPHOCYTES # BLD: 1.7 K/UL (ref 0.8–3.5)
LYMPHOCYTES NFR BLD: 27 % (ref 12–49)
MCH RBC QN AUTO: 31 PG (ref 26–34)
MCHC RBC AUTO-ENTMCNC: 33.1 G/DL (ref 30–36.5)
MCV RBC AUTO: 93.9 FL (ref 80–99)
MONOCYTES # BLD: 0.7 K/UL (ref 0–1)
MONOCYTES NFR BLD: 11 % (ref 5–13)
NEUTS SEG # BLD: 3.7 K/UL (ref 1.8–8)
NEUTS SEG NFR BLD: 60 % (ref 32–75)
NRBC # BLD: 0 K/UL (ref 0–0.01)
NRBC BLD-RTO: 0 PER 100 WBC
PLATELET # BLD AUTO: 223 K/UL (ref 150–400)
PMV BLD AUTO: 8.7 FL (ref 8.9–12.9)
POTASSIUM SERPL-SCNC: 4.1 MMOL/L (ref 3.5–5.1)
RBC # BLD AUTO: 3.77 M/UL (ref 4.1–5.7)
SODIUM SERPL-SCNC: 138 MMOL/L (ref 136–145)
WBC # BLD AUTO: 6.2 K/UL (ref 4.1–11.1)

## 2019-01-13 PROCEDURE — 80048 BASIC METABOLIC PNL TOTAL CA: CPT

## 2019-01-13 PROCEDURE — 85025 COMPLETE CBC W/AUTO DIFF WBC: CPT

## 2019-01-13 PROCEDURE — 74011250636 HC RX REV CODE- 250/636: Performed by: INTERNAL MEDICINE

## 2019-01-13 PROCEDURE — 74011000258 HC RX REV CODE- 258: Performed by: INTERNAL MEDICINE

## 2019-01-13 PROCEDURE — 94760 N-INVAS EAR/PLS OXIMETRY 1: CPT

## 2019-01-13 PROCEDURE — 65270000029 HC RM PRIVATE

## 2019-01-13 PROCEDURE — 36415 COLL VENOUS BLD VENIPUNCTURE: CPT

## 2019-01-13 PROCEDURE — 74011250637 HC RX REV CODE- 250/637: Performed by: INTERNAL MEDICINE

## 2019-01-13 RX ORDER — OXYCODONE AND ACETAMINOPHEN 5; 325 MG/1; MG/1
1 TABLET ORAL
Status: DISCONTINUED | OUTPATIENT
Start: 2019-01-13 | End: 2019-01-14 | Stop reason: HOSPADM

## 2019-01-13 RX ADMIN — CEFEPIME HYDROCHLORIDE 2 G: 2 INJECTION, POWDER, FOR SOLUTION INTRAVENOUS at 12:59

## 2019-01-13 RX ADMIN — CEFEPIME HYDROCHLORIDE 2 G: 2 INJECTION, POWDER, FOR SOLUTION INTRAVENOUS at 05:02

## 2019-01-13 RX ADMIN — OXYCODONE AND ACETAMINOPHEN 1 TABLET: 5; 325 TABLET ORAL at 20:25

## 2019-01-13 RX ADMIN — METOPROLOL SUCCINATE 25 MG: 25 TABLET, EXTENDED RELEASE ORAL at 08:37

## 2019-01-13 RX ADMIN — OXYCODONE AND ACETAMINOPHEN 1 TABLET: 5; 325 TABLET ORAL at 14:06

## 2019-01-13 RX ADMIN — ACETAMINOPHEN 650 MG: 325 TABLET ORAL at 08:37

## 2019-01-13 RX ADMIN — OXYCODONE AND ACETAMINOPHEN 1 TABLET: 5; 325 TABLET ORAL at 02:53

## 2019-01-13 RX ADMIN — STANDARDIZED SENNA CONCENTRATE AND DOCUSATE SODIUM 1 TABLET: 8.6; 5 TABLET, FILM COATED ORAL at 08:36

## 2019-01-13 RX ADMIN — VANCOMYCIN HYDROCHLORIDE 1250 MG: 10 INJECTION, POWDER, LYOPHILIZED, FOR SOLUTION INTRAVENOUS at 06:31

## 2019-01-13 RX ADMIN — Medication 10 ML: at 13:04

## 2019-01-13 RX ADMIN — VITAMIN D, TAB 1000IU (100/BT) 2000 UNITS: 25 TAB at 08:37

## 2019-01-13 RX ADMIN — VANCOMYCIN HYDROCHLORIDE 1250 MG: 10 INJECTION, POWDER, LYOPHILIZED, FOR SOLUTION INTRAVENOUS at 14:07

## 2019-01-13 RX ADMIN — VANCOMYCIN HYDROCHLORIDE 1250 MG: 10 INJECTION, POWDER, LYOPHILIZED, FOR SOLUTION INTRAVENOUS at 20:32

## 2019-01-13 RX ADMIN — CEFEPIME HYDROCHLORIDE 2 G: 2 INJECTION, POWDER, FOR SOLUTION INTRAVENOUS at 20:25

## 2019-01-13 NOTE — PROGRESS NOTES
Hospitalist Progress Note    NAME: Royden Alpers   :  1960   MRN:  [id number]       Assessment / Plan:  Acute osteomyelitis of L4-L5 discitis in the setting of  Recent L4 laminectomy  S/p BMBx  We will start empiric vancomycin and cefepime after bone biopsy is complete  Send bone tissue and also synovial fluid for culture and Gram stain  Neurosurgery Dr. Hannah Harrell has been consulted  Consult infectious disease  Start Percocet for pain control, changed interval to q6hrs  Cont IV abx  Pt seen by NSGY  Pt reports steroid taper helped w prior back spasm, amenable to another course of steroids to avoid narcotic dependence, will discuss w ID    History of atrial fibrillation with controlled rate paroxysmal  Reports he has been sinus rhythm in the last 5 years  On metoprolol for rate control    History of asthma stable  On PRN albuterol        25.0 - 29.9 Overweight / Body mass index is 26.88 kg/m².     Code status: Full  Prophylaxis: SCD's  Recommended Disposition:  PT, OT, RN     Subjective:     Chief Complaint / Reason for Physician Visit  C/o difficulty sleeping d/t ongoing back pain, feels it may be due to bed in part. Denies fevers, chills, neausea. Pt notes symptom improvement on previous medrol dose pack and inquires as to whether tapering dose would be beneficial w noted concern of possible ongoing osteo. Review of Systems:  Symptom Y/N Comments  Symptom Y/N Comments   Fever/Chills n   Chest Pain n    Poor Appetite n   Edema n    Cough n   Abdominal Pain n    Sputum n   Joint Pain y Back pain   SOB/AGUILLON n   Pruritis/Rash     Nausea/vomit n   Tolerating PT/OT     Diarrhea    Tolerating Diet y    Constipation    Other       Could NOT obtain due to:      Objective:     VITALS:   Last 24hrs VS reviewed since prior progress note.  Most recent are:  Patient Vitals for the past 24 hrs:   Temp Pulse Resp BP SpO2   19 1216 98.4 °F (36.9 °C) 70 16 103/52 99 %   19 0749 98.2 °F (36.8 °C) 68 16 124/76 95 %   01/13/19 0501 98.5 °F (36.9 °C) 64 17 108/60 99 %   01/12/19 2300 98.2 °F (36.8 °C) 66 16 128/77 96 %   01/12/19 2041 98.6 °F (37 °C) 72 20 108/66 97 %     No intake or output data in the 24 hours ending 01/13/19 1344     PHYSICAL EXAM:  General: WD, WN. Alert, cooperative, no acute distress    EENT:  EOMI. Anicteric sclerae. MMM  Resp:  CTA bilaterally, no wheezing or rales. No accessory muscle use  CV:  Regular  rhythm,  No edema  GI:  Soft, Non distended, Non tender.  +Bowel sounds  Neurologic:  Alert and oriented X 3, normal speech,   Psych:   Good insight. Not anxious nor agitated  Skin:  No rashes. No jaundice    Reviewed most current lab test results and cultures  YES  Reviewed most current radiology test results   YES  Review and summation of old records today    NO  Reviewed patient's current orders and MAR    YES  PMH/SH reviewed - no change compared to H&P  ________________________________________________________________________  Care Plan discussed with:    Comments   Patient x    Family  x    RN x    Care Manager     Consultant                        Multidiciplinary team rounds were held today with , nursing, pharmacist and clinical coordinator. Patient's plan of care was discussed; medications were reviewed and discharge planning was addressed. ________________________________________________________________________  Total NON critical care TIME:  30  Minutes    Total CRITICAL CARE TIME Spent:   Minutes non procedure based      Comments   >50% of visit spent in counseling and coordination of care x    ________________________________________________________________________  Catie Daughertyt,      Procedures: see electronic medical records for all procedures/Xrays and details which were not copied into this note but were reviewed prior to creation of Plan. LABS:  I reviewed today's most current labs and imaging studies.   Pertinent labs include:  Recent Labs 01/13/19  0257 01/12/19  0420 01/11/19  1354   WBC 6.2 7.1 8.3   HGB 11.7* 12.4 13.8   HCT 35.4* 36.2* 40.6    251 271     Recent Labs     01/13/19  0257 01/12/19  0420 01/11/19  1354    138 138   K 4.1 4.2 4.1    103 103   CO2 28 30 27    94 107*   BUN 13 16 17   CREA 0.85 0.79 0.89   CA 8.2* 8.0* 8.4*       Signed: Aminta Heart,

## 2019-01-13 NOTE — PROGRESS NOTES
0700: Bedside shift change report given to Radha Silveira RN (oncoming nurse) by Bishop Carroll RN (offgoing nurse). Report included the following information SBAR, Kardex and ED Summary.

## 2019-01-13 NOTE — PROGRESS NOTES
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)     Current Regimen of Each Antimicrobial:  Vancomycin 1000 mg IV every 8hrs (Start Date 19; Day # 2)  Cefepime 2g IV every 8hr (start date: 19, Day #2)    Previous Antimicrobial Therapy:    Goal Level: VANCOMYCIN TROUGH GOAL RANGE  Vancomycin Trough: 15 - 20 mcg/mL    Date Dose & Interval Measured (mcg/mL) Extrapolated (mcg/mL)   2019 1000 mg IV Q 8h 15.9 11.23 (corrected for previous dose timing)                 Date & time of next level: prior to 19 0500 dose- need to order    Significant Cultures:   PTA 19 Paired Blood = NG x 4 days = pending  19 body fluid  NG x 1 d = pending  19 anaerobic  NG x 1 d = pending    Radiology / Imaging results: (X-ray, CT scan or MRI):   1/10/19 MRI   IMPRESSION:    1. Findings consistent with L4-L5 osteomyelitis-discitis. No organized epidural  or prevertebral abscess    Paralysis, amputations, malnutrition: none    Labs:  Recent Labs     19  0420 19  1354   CREA 0.79 0.89   BUN 16 17   WBC 7.1 8.3     Temp (24hrs), Av.7 °F (37.1 °C), Min:98.5 °F (36.9 °C), Max:99 °F (37.2 °C)    Creatinine Clearance (mL/min) or Dialysis:  >100    Impression/Plan:   · Will continue Cefepime 2 gm IV every 8hrs as ordered, as appropriate for current renal function and indication. · Per Dr. Wilda Angelucci, target trough of 15 mcg/ml, please do not exceed 20 mcg/ml. · Neurosurgery has noted that pt symptomatology is inconsistent with clinical MRI, awaiting bone biopsy results   · Current trough level (corrected for previous dose timing) from a dose of 1000 mg IV every 8 hrs = 11.23, which is lower then desired.   ·   · Will increase Vancomycin dose to 1250 mg IV every 8hrs for an estimated peak/trough of 29/15 mcg/ml respectively, and check trough value with the 4th dose, AUC of  377 (slightly lower than the 400 recommended, will honor Dr. Zaire Mccain wishes as above. · Antimicrobial stop date to be determined     Pharmacy will follow daily and adjust medications as appropriate for renal function and/or serum levels.

## 2019-01-14 ENCOUNTER — HOME HEALTH ADMISSION (OUTPATIENT)
Dept: HOME HEALTH SERVICES | Facility: HOME HEALTH | Age: 59
End: 2019-01-14
Payer: COMMERCIAL

## 2019-01-14 VITALS
SYSTOLIC BLOOD PRESSURE: 113 MMHG | HEART RATE: 62 BPM | HEIGHT: 72 IN | RESPIRATION RATE: 16 BRPM | BODY MASS INDEX: 26.84 KG/M2 | WEIGHT: 198.19 LBS | TEMPERATURE: 97.8 F | OXYGEN SATURATION: 99 % | DIASTOLIC BLOOD PRESSURE: 71 MMHG

## 2019-01-14 LAB
ANION GAP SERPL CALC-SCNC: 6 MMOL/L (ref 5–15)
BUN SERPL-MCNC: 12 MG/DL (ref 6–20)
BUN/CREAT SERPL: 15 (ref 12–20)
CALCIUM SERPL-MCNC: 8.3 MG/DL (ref 8.5–10.1)
CHLORIDE SERPL-SCNC: 106 MMOL/L (ref 97–108)
CO2 SERPL-SCNC: 27 MMOL/L (ref 21–32)
CREAT SERPL-MCNC: 0.78 MG/DL (ref 0.7–1.3)
DATE LAST DOSE: ABNORMAL
GLUCOSE SERPL-MCNC: 101 MG/DL (ref 65–100)
POTASSIUM SERPL-SCNC: 3.9 MMOL/L (ref 3.5–5.1)
REPORTED DOSE,DOSE: ABNORMAL UNITS
REPORTED DOSE/TIME,TMG: 2100
SODIUM SERPL-SCNC: 139 MMOL/L (ref 136–145)
VANCOMYCIN TROUGH SERPL-MCNC: 16.6 UG/ML (ref 5–10)

## 2019-01-14 PROCEDURE — 74011250637 HC RX REV CODE- 250/637: Performed by: INTERNAL MEDICINE

## 2019-01-14 PROCEDURE — 0S923ZX DRAINAGE OF LUMBAR VERTEBRAL DISC, PERCUTANEOUS APPROACH, DIAGNOSTIC: ICD-10-PCS | Performed by: STUDENT IN AN ORGANIZED HEALTH CARE EDUCATION/TRAINING PROGRAM

## 2019-01-14 PROCEDURE — 74011250636 HC RX REV CODE- 250/636: Performed by: INTERNAL MEDICINE

## 2019-01-14 PROCEDURE — 76937 US GUIDE VASCULAR ACCESS: CPT

## 2019-01-14 PROCEDURE — 74011000258 HC RX REV CODE- 258: Performed by: INTERNAL MEDICINE

## 2019-01-14 PROCEDURE — 02HV33Z INSERTION OF INFUSION DEVICE INTO SUPERIOR VENA CAVA, PERCUTANEOUS APPROACH: ICD-10-PCS | Performed by: INTERNAL MEDICINE

## 2019-01-14 PROCEDURE — 80048 BASIC METABOLIC PNL TOTAL CA: CPT

## 2019-01-14 PROCEDURE — 80202 ASSAY OF VANCOMYCIN: CPT

## 2019-01-14 PROCEDURE — 77030018719 HC DRSG PTCH ANTIMIC J&J -A

## 2019-01-14 PROCEDURE — C1751 CATH, INF, PER/CENT/MIDLINE: HCPCS

## 2019-01-14 PROCEDURE — 77030018786 HC NDL GD F/USND BARD -B

## 2019-01-14 PROCEDURE — 36569 INSJ PICC 5 YR+ W/O IMAGING: CPT | Performed by: INTERNAL MEDICINE

## 2019-01-14 PROCEDURE — 36415 COLL VENOUS BLD VENIPUNCTURE: CPT

## 2019-01-14 RX ORDER — OXYCODONE AND ACETAMINOPHEN 5; 325 MG/1; MG/1
1 TABLET ORAL
Qty: 15 TAB | Refills: 0 | Status: SHIPPED | OUTPATIENT
Start: 2019-01-14 | End: 2019-12-17

## 2019-01-14 RX ORDER — DEXAMETHASONE SODIUM PHOSPHATE 4 MG/ML
4 INJECTION, SOLUTION INTRA-ARTICULAR; INTRALESIONAL; INTRAMUSCULAR; INTRAVENOUS; SOFT TISSUE EVERY 12 HOURS
Status: DISCONTINUED | OUTPATIENT
Start: 2019-01-14 | End: 2019-01-14

## 2019-01-14 RX ORDER — HEPARIN 100 UNIT/ML
300 SYRINGE INTRAVENOUS AS NEEDED
Status: DISCONTINUED | OUTPATIENT
Start: 2019-01-14 | End: 2019-01-14 | Stop reason: HOSPADM

## 2019-01-14 RX ORDER — SODIUM CHLORIDE 0.9 % (FLUSH) 0.9 %
10-30 SYRINGE (ML) INJECTION AS NEEDED
Status: DISCONTINUED | OUTPATIENT
Start: 2019-01-14 | End: 2019-01-14 | Stop reason: HOSPADM

## 2019-01-14 RX ORDER — SODIUM CHLORIDE 0.9 % (FLUSH) 0.9 %
10 SYRINGE (ML) INJECTION AS NEEDED
Status: DISCONTINUED | OUTPATIENT
Start: 2019-01-14 | End: 2019-01-14 | Stop reason: HOSPADM

## 2019-01-14 RX ORDER — BACITRACIN 500 UNIT/G
1 PACKET (EA) TOPICAL AS NEEDED
Status: DISCONTINUED | OUTPATIENT
Start: 2019-01-14 | End: 2019-01-14 | Stop reason: HOSPADM

## 2019-01-14 RX ORDER — SODIUM CHLORIDE 0.9 % (FLUSH) 0.9 %
10-40 SYRINGE (ML) INJECTION EVERY 8 HOURS
Status: DISCONTINUED | OUTPATIENT
Start: 2019-01-14 | End: 2019-01-14 | Stop reason: HOSPADM

## 2019-01-14 RX ORDER — SODIUM CHLORIDE 0.9 % (FLUSH) 0.9 %
10 SYRINGE (ML) INJECTION EVERY 24 HOURS
Status: DISCONTINUED | OUTPATIENT
Start: 2019-01-14 | End: 2019-01-14 | Stop reason: HOSPADM

## 2019-01-14 RX ADMIN — ACETAMINOPHEN 650 MG: 325 TABLET ORAL at 09:29

## 2019-01-14 RX ADMIN — OXYCODONE AND ACETAMINOPHEN 1 TABLET: 5; 325 TABLET ORAL at 02:20

## 2019-01-14 RX ADMIN — Medication 10 ML: at 16:00

## 2019-01-14 RX ADMIN — Medication 10 ML: at 06:27

## 2019-01-14 RX ADMIN — VANCOMYCIN HYDROCHLORIDE 1250 MG: 10 INJECTION, POWDER, LYOPHILIZED, FOR SOLUTION INTRAVENOUS at 11:34

## 2019-01-14 RX ADMIN — VITAMIN D, TAB 1000IU (100/BT) 2000 UNITS: 25 TAB at 09:22

## 2019-01-14 RX ADMIN — VANCOMYCIN HYDROCHLORIDE 1250 MG: 10 INJECTION, POWDER, LYOPHILIZED, FOR SOLUTION INTRAVENOUS at 04:36

## 2019-01-14 RX ADMIN — CEFEPIME HYDROCHLORIDE 2 G: 2 INJECTION, POWDER, FOR SOLUTION INTRAVENOUS at 06:27

## 2019-01-14 RX ADMIN — METOPROLOL SUCCINATE 25 MG: 25 TABLET, EXTENDED RELEASE ORAL at 09:22

## 2019-01-14 RX ADMIN — Medication 10 ML: at 02:21

## 2019-01-14 RX ADMIN — STANDARDIZED SENNA CONCENTRATE AND DOCUSATE SODIUM 1 TABLET: 8.6; 5 TABLET, FILM COATED ORAL at 09:22

## 2019-01-14 RX ADMIN — CEFEPIME HYDROCHLORIDE 2 G: 2 INJECTION, POWDER, FOR SOLUTION INTRAVENOUS at 15:58

## 2019-01-14 RX ADMIN — Medication 10 ML: at 16:01

## 2019-01-14 NOTE — PROGRESS NOTES
Infectious Disease Progress Note    is a [age] [facility] y.o. male who is being seen for - Osteomyelitis / Discitis. IMPRESSION:   · L4/5 osteomyelitis /discitis on MRI lumbar spine 1/10. Small fluid collections in R/para spinal musculature at level of L4 & in s/cutaneous soft tissue overlying spinous processes extending from L4 to S1 not significantly changed in size from previous MRI . Thicker rind of peripheral enhancement L4- S1 compared to previous MRI raising suspicion for infectious process ,  but no erosive change ,no prevertebral or epidural  abscess formation, ? Post surgical vs infectious etiology . D/w Dr Namrata Mancuso  ·  No significant improvement in pain symptoms since starting antibiotics  · H/o Lumbar laminectomy/ discectomy on 11/28 /18 followed 3 weeks later by low grade temps, exacerbation of low back pain. · MRI done 12/28 non specific new edema L4/5 disc space with no obvious erosive change , no disc enhancement representing post surgical change rather than infection. 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 · Plan for 6 weeks of antimicrobial therapy IV, with biweekly follow up & re evauation at 4 week karen. . Pt would need  follow up imaging after completion of antibiotics.    · Weekly CBC , CMP on Wednesdays fax to [phone], call with abnormal results [phone]  ·  Out patient follow up in Clinic on 19 at 100 pm  · Discussed plan of care with pt again         Subjective:     Pt seen . Reports pain at night . Better during day. Pain in lower back     Review of Systems:  A comprehensive review of systems was negative except for that written in the History of Present Illness. 10 point ROS obtained . All other systems negative . Objective:     Blood pressure 136/76, pulse 85, temperature 97.9 °F (36.6 °C), resp. rate 16, height 6' (1.829 m), weight 198 lb 3.1 oz (89.9 kg), SpO2 99 %. Temp (24hrs), Av.2 °F (36.8 °C), Min:97.9 °F (36.6 °C), Max:98.4 °F (36.9 °C)      Patient Vitals for the past 24 hrs:   Temp Pulse Resp BP SpO2   19 0917 97.9 °F (36.6 °C) 85 16 136/76 99 %   19 0436 98.3 °F (36.8 °C) 63 16 106/58 95 %   19 2025 98.1 °F (36.7 °C) 75 16 125/83 99 %   19 1521 98.3 °F (36.8 °C) 72 16 104/69 95 %   19 1216 98.4 °F (36.9 °C) 70 16 103/52 99 %         Lines:  Peripheral IV:       Physical Exam:   General:  Alert, cooperative,    Eyes:  Sclera anicteric. Pupils equally round and reactive to light. Mouth/Throat: Mucous membranes normal, oral pharynx clear   Neck: Supple   Lungs:   Clear to auscultation bilaterally, good effort   CV:  Regular rate and rhythm,no murmur, click, rub or gallop   Abdomen:   Soft, non-tender. bowel sounds normal. non-distended   Extremities: No  edema   Skin: Skin color, texture, turgor normal. no acute rash or lesions   Lymph nodes: Cervical and supraclavicular normal   Musculoskeletal: No swelling or deformity, non tender lower back   Lines/Devices:  Intact, no erythema, drainage or tenderness   Psych: Alert and oriented.        Data Review:   CBC:   Recent Labs     19  0257 19  0420 19  1354   WBC 6.2 7.1 8.3   RBC 3.77* 3.89* 4.39   HGB 11.7* 12.4 13.8   HCT 35.4* 36.2* 40.6    251 271   GRANS 60 63 75   LYMPH 27 25 15   EOS 2 1 0     CMP:   Recent Labs     19  0624 19  0257 01/12/19  0420   * 100 94    138 138   K 3.9 4.1 4.2    105 103   CO2 27 28 30   BUN 12 13 16   CREA 0.78 0.85 0.79   CA 8.3* 8.2* 8.0*   AGAP 6 5 5   BUCR 15 15 20       Studies:      Lab Results   Component Value Date/Time    Culture result: NO GROWTH 3 DAYS 01/11/2019 04:40 PM    Culture result: Culture performed on Fluid swab specimen 01/11/2019 04:40 PM    Culture result: NO GROWTH 3 DAYS 01/11/2019 04:40 PM    Culture result: NO GROWTH 5 DAYS 01/08/2019 11:24 AM    Culture result: NO GROWTH 5 DAYS 01/08/2019 11:20 AM        XR Results (most recent):  Results from [place] encounter on 11/28/18   XR SPINE SNGL V (CROSS TABLE LAT)    Narrative EXAM:  XR SPINE SNGL V (CROSS TABLE LAT)    INDICATION:    L4-L5 Laminectomy Discectomy    Portable fluoroscopy was utilized during lumbar spine surgery with a single  lateral view indication lumbosacral junction obtained. Impression IMPRESSION:  Portable fluoroscopy utilized during surgery. REPORT PROVIDED FOR COMPLIANCE ONLY AT NO CHARGE                     Patient Active Problem List   Diagnosis Code    Intermittent atrial fibrillation (HCC) I48.0    Vitamin D deficiency E55.9    Personal history of colonic polyps Z86.010    Herniated lumbar intervertebral disc M51.26    S/P lumbar laminectomy Z98.890    Acute oste[facility] M46.20         ICD-10-CM ICD-9-CM    1. Osteomyelitis, unspecified site, unspecified type ([facility] M86.9 730.20        I have discussed the diagnosis with the patient and the intended plan as seen in the above orders. I have discussed medication side effects and warnings with the patient as well.     Reviewed test results at length with patient    Anti-infectives:      Vancomycin/ Cefepime- 1/12     Molly Corona MD FACP

## 2019-01-14 NOTE — PROGRESS NOTES
Saw pt yesterday and today and spoke with ID yesterday after speaking with pt and his wife who was in room with him  Today he is alone  Both days, he is awake, in NAD, sitting up in bed working on his computer and reading a newspaper. afeb vss  C/o of muscle spasms in back, no sciatica. ID MD also not certain he has an infection but would be willing to treat with a short course of antibiotics to start. I will defer to them.  He looks and feels well  Might try short course of steroids to help with the spasm pain  Good progress

## 2019-01-14 NOTE — PROGRESS NOTES
Problem: Falls - Risk of  Goal: *Absence of Falls  Document Jie Fall Risk and appropriate interventions in the flowsheet.   Outcome: Progressing Towards Goal  Fall Risk Interventions:  Mobility Interventions: Patient to call before getting OOB         Medication Interventions: Teach patient to arise slowly    Elimination Interventions: Call light in reach

## 2019-01-14 NOTE — PROGRESS NOTES
12:00 PM- CM met with pt in regards to d/c planning. Pt is requesting [address] for [facility] RN. Referral sent. Waiting on response. Referral sent to [facility] for IV ABX. Waiting on response. 3:00 PM- Pt has been accepted with both [facility] and [address]. AVS updated. Pt informed. Pt IV ABX will be delivered to the hospital prior to d/c at approx 4:00-5:00 PM due to the weather. Pt and RN informed. Pt received teaching from Alphonso Lopez with [facility] and is just waiting on IV ABX. Pt wife will drive pt home at time of discharge. No further CM needs identified. Care Management Interventions  PCP Verified by CM:  Yes  MyChart Signup: No  Discharge Durable Medical Equipment: No  Physical Therapy Consult: No  Occupational Therapy Consult: No  Speech Therapy Consult: No  Current Support Network: Lives with Spouse  Confirm Follow Up Transport: Family  Plan discussed with Pt/Family/Caregiver: Yes  Discharge Location  Discharge Placement: Home with home health(IV ABX)     HEMANTH Shah, [address]    [phone]

## 2019-01-14 NOTE — PROGRESS NOTES
PICC (Peripherally Inserted Central Catheter) line insertion  procedure note :     Procedure explained to patient along with risks and benefits  and patient agreed to proceed. Informed consent obtained from  patient. Patient teaching completed. Timeout completed. Pre-procedure assessment done. Maximum sterile barrier precautions observed throughout procedure. Lidocaine 1%  3.0    ml sq given prior to cannulation. Cannulated basilic  vein using ultrasound guidance and modified seldinger technique. Inserted 5  Sami double  lumen PICC to right arm using QBE Tip Location System and  [address]. Pt has    sinus   rhythm. PICC tip location was confirmed by 3 CG tip positioning system, indicating tall P wave and no negative deflection before P wave which would indicate that the PICC tip is properly placed in the distal SVC or at the [place]. PICC tip location was  confirmed by 2 PICC nurses and 3CG printout placed on patient's chart. Blood return verified and flushed with 20 ml normal saline in each port. Sterile dressing applied with biopatch, statLock and occlusive dressing as per protocol. Curos caps applied to each port. Patient tolerated procedure well with minimal blood loss ( less than 5 ml.)   Patient education material provided. PICC procedure performed by  :  Dany Rader RN. PICC nurse  Assisted by : Cherelle Costa RN  PICC nurse  Reason for access : reliable access / MD order /   Donnamae Lapping term IV medication administration / Discharge with PICC line   Complications related to insertion  : none  X-Ray : not applicable  Notified primary nurse    RN  that  PICC line can be used. Total Trimmed Length :  41   cm   External Length : 0  cm   PICC line site arm circumference:  36    cm   PICC catheter occupies  10   % of vein  Type of PICC: Bard Solo Power PICC   Ref # :   [id number]     Lot # :  [id number]   Expiration Date :    2020-04-30     Dany Rader RN. BSN. CRNI,CMSRN. Clinician IV .  PICC Nurse, Vascular Access Team.

## 2019-01-14 NOTE — PROGRESS NOTES
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)     Current Regimen of Each Antimicrobial:  Vancomycin 1250 mg IV every 8hrs (Start Date 19; Day #4)  Cefepime 2g IV every 8hr (start date: 19, Day #4)    Previous Antimicrobial Therapy:    Goal Level: VANCOMYCIN TROUGH GOAL RANGE  Vancomycin Trough: 15 - 20 mcg/mL    Date Dose & Interval Measured (mcg/mL) Extrapolated (mcg/mL)   2019 1000 mg IV Q 8h 15.9 11.23 (corrected for previous dose timing)   19 1250 mg IV Q 8H 16.6 15.7           Date & time of next level: prior to 19 0500 dose- ordered    Significant Cultures:   PTA 19 Paired Blood = NG x 5 days = pending  19 body fluid  NG x 2 d = pending  19 body fluid  (anaerobic) NG x 2 d = pending    Radiology / Imaging results: (X-ray, CT scan or MRI):   1/10/19 MRI   IMPRESSION:    1. Findings consistent with L4-L5 osteomyelitis-discitis. No organized epidural  or prevertebral abscess    Paralysis, amputations, malnutrition: none    Labs:  Recent Labs     19  0624 19  0257 19  0420 19  1354   CREA 0.78 0.85 0.79 0.89   BUN 12 13 16 17   WBC  --  6.2 7.1 8.3     Temp (24hrs), Av.2 °F (36.8 °C), Min:97.9 °F (36.6 °C), Max:98.4 °F (36.9 °C)    Creatinine Clearance (mL/min) or Dialysis:  >100    Impression/Plan:   · Will continue Cefepime 2 gm IV every 8hrs as ordered, as appropriate for current renal function and indication. · Per Dr. Mendoza Dasilva, target trough of 15 mcg/ml, please do not exceed 20 mcg/ml. · Neurosurgery has noted that pt symptomatology is inconsistent with clinical MRI, awaiting bone biopsy results   · Corrected/Extrapolated Vancomycin level today = 15.7 mcg/ml;  Will continue 1250 mg IV every 8hrs  as appropriate for current renal function and indication. · Antimicrobial stop date: To be determined     Pharmacy will follow daily and adjust medications as appropriate for renal function and/or serum levels.   Gloria Nelson [facility]

## 2019-01-14 NOTE — PROGRESS NOTES
I spoke with Dr Yandel Tejeda earlier, she would prefer that the pt not be started on steroids at this time but to leave on antibiotics alone Since he had gotten some good relief on steroids a short time after surgery, he was hoping to go home on them as well but I will defer to ID even though I do not believe this was a true case of osteomyelitis but a bit of an over-read by radiology. End plates of the vertebrae are intact. Note there has been no growth to date of blood or disc cultures and WBC remains low.  His incision is non tender well healed,  Nursing notes that he is ambulating in halls overnight and appears comfortable he can follow up in 2 weeks in the office

## 2019-01-14 NOTE — DISCHARGE SUMMARY
Hospitalist Discharge Summary     Patient ID:  Monico Aguirre  [id number]  54 y.o.  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 Jose Robb was in his usual state of health until about 3 months ago when he had a sudden episode of coughing and subsequently started having acute low back pain and also had foot drop and subsequently was noted to have acute disc herniation at the level of L4-L5 for which he underwent surgery by Dr. Apolinar Durbin and recovered well with resolution of foot drop.  Around [place] he started having some acute low back pain, chills and borderline features for which she underwent an MRI which was within normal limits and was prescribed steroids and pain medications.  After steroids were completed, he started having low-grade fever along with chills and acute back pain for which he saw his primary care physician and underwent a MRI today which showed evidence of osteomyelitis at the level of L4-L5 and was advised to come to the emergency department for further evaluation. Jose Robb currently reports pain is about 5 x 10, increased with activity and without any relieving factors.  He also reports borderline fever along with chills.  His pain is currently controlled with Percocet.  Does not report any other complaints at this time. ______________________________________________________________________  DISCHARGE SUMMARY/HOSPITAL COURSE:  for full details see H&P, daily progress notes, labs, consult notes. Acute osteomyelitis of L4-L5 discitis in the setting of  Recent L4 laminectomy  S/p BMBx  We will start empiric vancomycin and cefepime after bone biopsy is complete  Send bone tissue and also synovial fluid for culture and Gram stain  Neurosurgery Dr. Hannah Harrell has been consulted  Consult infectious disease  Start Percocet for pain control, changed interval to q6hrs  To complete IV vanc and cefepime via picc line as outpatient. Remove picc after completion of abx. Pt to follow outpatient with ID  Pt seen by JULIEN  Pt reports steroid taper helped w prior back spasm, amenable to another course of steroids to avoid narcotic dependence, will discuss w ID    History of atrial fibrillation with controlled rate paroxysmal  Reports he has been sinus rhythm in the last 5 years  On metoprolol for rate control    History of asthma stable  On PRN albuterol        25.0 - 29.9 Overweight / Body mass index is 26.88 kg/m².     Code status: Full  Prophylaxis: SCD's  Recommended Disposition:  PT, OT, RN    _______________________________________________________________________  Patient seen and examined by me on discharge day. Pertinent Findings:  Gen:    Not in distress  Chest: Clear lungs  CVS:   Regular rhythm. No edema  Abd:  Soft, not distended, not tender  Neuro:  Alert, oriented x 3e  _______________________________________________________________________  DISCHARGE MEDICATIONS:   Current Discharge Medication List      CONTINUE these medications which have CHANGED    Details   oxyCODONE-acetaminophen (PERCOCET) 5-325 mg per tablet Take 1 Tab by mouth every six (6) hours as needed. Max Daily Amount: 4 Tabs. Qty: 15 Tab, Refills: 0    Associated Diagnoses: Osteomyelitis, unspecified site, unspecified type ([address]         CONTINUE these medications which have NOT CHANGED    Details   meloxicam (MOBIC) 15 mg tablet Take 15 mg by mouth daily. aspirin delayed-release 81 mg tablet Take 1 Tab by mouth daily.  May restart on 12/01      omeprazole (PRILOSEC) 20 mg capsule Take 20 mg by mouth daily. senna-docusate (PERICOLACE) 8.6-50 mg per tablet Take 1 Tab by mouth daily. Qty: 30 Tab, Refills: 0      metoprolol succinate (TOPROL-XL) 100 mg tablet 1/2 to 1 tab daily as directed  Qty: 90 Tab, Refills: 1    Associated Diagnoses: Intermittent atrial fibrillation (HCC)      cholecalciferol (VITAMIN D3) 1,000 unit tablet Take 2 Tabs by mouth daily. albuterol (PROVENTIL HFA, VENTOLIN HFA, PROAIR HFA) 90 mcg/actuation inhaler Take 1 Puff by inhalation every six (6) hours as needed for Wheezing. Indications: BRONCHOSPASM PREVENTION  Qty: 1 Inhaler, Refills: 11             My Recommended Diet, Activity, Wound Care, and follow-up labs are listed in the patient's Discharge Insturctions which I have personally completed and reviewed.     ______________________________________________________________________    Risk of deterioration: Low    Condition at Discharge:  Stable  ______________________________________________________________________    Disposition  Home with family, no needs  ______________________________________________________________________    Care Plan discussed with:   Patient, Family, RN, Care Manager, Consultant    ______________________________________________________________________    Code Status: Full Code  ______________________________________________________________________      Follow up with:   PCP : Gulshan Gonsalves MD  Follow-up Information     Follow up With Specialties Details Why Contact Info    Gulshan Gonsalves MD Internal Medicine   [address] [place] [address]  [phone]                Total time in minutes spent coordinating this discharge (includes going over instructions, follow-up, prescriptions, and preparing report for sign off to her PCP) :  >30 minutes    Signed:  Olegario Azar DO

## 2019-01-14 NOTE — PROGRESS NOTES
Reviewed discharge instructions, prescriptions, and side effects with patient. Reviewed follow-up instructions, and medication instructions. Answered all questions and provided contact information for future questions. Took IV out per policy, Catheter tip intact. Going home in a car with home health.

## 2019-01-15 ENCOUNTER — PATIENT OUTREACH (OUTPATIENT)
Dept: OTHER | Age: 59
End: 2019-01-15

## 2019-01-15 ENCOUNTER — TELEPHONE (OUTPATIENT)
Dept: FAMILY MEDICINE CLINIC | Age: 59
End: 2019-01-15

## 2019-01-15 ENCOUNTER — HOME CARE VISIT (OUTPATIENT)
Dept: SCHEDULING | Facility: HOME HEALTH | Age: 59
End: 2019-01-15
Payer: COMMERCIAL

## 2019-01-15 VITALS
WEIGHT: 190 LBS | BODY MASS INDEX: 25.73 KG/M2 | HEIGHT: 72 IN | OXYGEN SATURATION: 97 % | SYSTOLIC BLOOD PRESSURE: 100 MMHG | RESPIRATION RATE: 18 BRPM | TEMPERATURE: 99.2 F | HEART RATE: 72 BPM | DIASTOLIC BLOOD PRESSURE: 62 MMHG

## 2019-01-15 LAB
BACTERIA SPEC CULT: NORMAL
BACTERIA SPEC CULT: NORMAL
GRAM STN SPEC: NORMAL
GRAM STN SPEC: NORMAL
SERVICE CMNT-IMP: NORMAL

## 2019-01-15 PROCEDURE — G0299 HHS/HOSPICE OF RN EA 15 MIN: HCPCS

## 2019-01-15 PROCEDURE — 400013 HH SOC

## 2019-01-15 NOTE — PROGRESS NOTES
Patient on [facility] discharge report dated 1/14. Left message on voicemail. Will attempt to contact again. Need to complete post-discharge assessment.

## 2019-01-15 NOTE — PROGRESS NOTES
Patient returned this care manager's call and left a VM declining care management needs. Patient does not identify any Care Management needs at this time and declines services. Patient did confirm that HHN was with him earlier today and that should he need assistance or care coordination in the future, he would reach out to this care manager for services.

## 2019-01-15 NOTE — TELEPHONE ENCOUNTER
Pls call Geraldo De Souza w/[facility]     Has questions about drawing labs and medication     Best number to reach her is [phone]

## 2019-01-16 ENCOUNTER — HOME CARE VISIT (OUTPATIENT)
Dept: SCHEDULING | Facility: HOME HEALTH | Age: 59
End: 2019-01-16
Payer: COMMERCIAL

## 2019-01-16 VITALS
TEMPERATURE: 99.1 F | RESPIRATION RATE: 18 BRPM | OXYGEN SATURATION: 98 % | DIASTOLIC BLOOD PRESSURE: 82 MMHG | SYSTOLIC BLOOD PRESSURE: 118 MMHG | HEART RATE: 71 BPM

## 2019-01-16 PROCEDURE — G0299 HHS/HOSPICE OF RN EA 15 MIN: HCPCS

## 2019-01-17 LAB
BACTERIA SPEC CULT: ABNORMAL
SERVICE CMNT-IMP: ABNORMAL

## 2019-01-18 ENCOUNTER — DOCUMENTATION ONLY (OUTPATIENT)
Dept: FAMILY MEDICINE CLINIC | Age: 59
End: 2019-01-18

## 2019-01-18 ENCOUNTER — TELEPHONE (OUTPATIENT)
Dept: FAMILY MEDICINE CLINIC | Age: 59
End: 2019-01-18

## 2019-01-18 NOTE — PROGRESS NOTES
GRAM STAIN NO ORGANISMS SEEN    Final   Culture result:    Final   Culture performed on Fluid swab specimen    Culture result:        Requests: DISC   Final   Culture result: LIGHT PROPIONIBACTERIUM ACNES Abnormal     Final   Result History         Spoke to patient . No fever, chills . Still having pain in lower back . Cultures + for Priopionibacterum acnes which could be a contaminant . Is also seen as a pathogen post surgery . D/w [facility] . Requested culture be sent out for further ID & sensitivities . They said they  Do not send cultures  Out as they  take many weeks to come back   They recommend  Following antibiotics for treatment -Piptazobactam/ Imipenem/ Ertapenem/ Clindadmycin, Moxifloxacin   D/w pt , will change to Ertapenem IV   Will inform HH     For Ertapenem 1 G IV daily x 6 weeks end date 3/1/19   Weekly CBC , CMP on Mondays , ESR , CRP on February 25, fax all reports to me at [phone], call with abnormal results at [phone]   DC Vancomycin , Cefepime.

## 2019-01-18 NOTE — TELEPHONE ENCOUNTER
Called nurse Lesa  22. the call on 1/15/19, nurse was calling for clarification on weekly blood draws CBC and CMP.  Nurse denies any further questions or concerns

## 2019-01-22 ENCOUNTER — DOCUMENTATION ONLY (OUTPATIENT)
Dept: FAMILY MEDICINE CLINIC | Age: 59
End: 2019-01-22

## 2019-01-22 ENCOUNTER — HOME CARE VISIT (OUTPATIENT)
Dept: HOME HEALTH SERVICES | Facility: HOME HEALTH | Age: 59
End: 2019-01-22
Payer: COMMERCIAL

## 2019-01-22 NOTE — PROGRESS NOTES
D/w pt today . No fever, chills    But pain in low back still + . D/w him that I would let Dr Ananth Gibbs know . Left Voicemail for Dr Ananth Gibbs regarding pt. Before this on 1/18 I communicated with pt regarding his culture results , change of antibiotic . Spoke with Mariela Jo at [facility] as well on 1/18 & co- ordinated care .  Pt started Ertapenem on 1/19, first dose at [facility] office  Hailey Pappas MD FACP

## 2019-01-23 ENCOUNTER — TELEPHONE (OUTPATIENT)
Dept: FAMILY MEDICINE CLINIC | Age: 59
End: 2019-01-23

## 2019-01-23 ENCOUNTER — HOME CARE VISIT (OUTPATIENT)
Dept: SCHEDULING | Facility: HOME HEALTH | Age: 59
End: 2019-01-23
Payer: COMMERCIAL

## 2019-01-23 VITALS
TEMPERATURE: 99.1 F | SYSTOLIC BLOOD PRESSURE: 122 MMHG | DIASTOLIC BLOOD PRESSURE: 70 MMHG | OXYGEN SATURATION: 98 % | HEART RATE: 69 BPM

## 2019-01-23 PROCEDURE — G0299 HHS/HOSPICE OF RN EA 15 MIN: HCPCS

## 2019-01-23 NOTE — TELEPHONE ENCOUNTER
----- Message from [address] sent at 1/23/2019 10:15 AM EST -----  Regarding: Dr. Kp Olivier with [facility] is requesting a callback from the nurse in regards to questions she has about the new antibiotic and lab work. Best contact number is [phone].

## 2019-01-24 NOTE — TELEPHONE ENCOUNTER
Spoke with home health nurse. Confirmed  the change in patient's IV antibiotics. Confirmed the patient is to be getting CBC and a CMP every Monday. Also informed nurse patient is to be getting ESR and CRP. Nurse denies any further questions or concerns.

## 2019-01-28 ENCOUNTER — HOME CARE VISIT (OUTPATIENT)
Dept: SCHEDULING | Facility: HOME HEALTH | Age: 59
End: 2019-01-28
Payer: COMMERCIAL

## 2019-01-28 ENCOUNTER — DOCUMENTATION ONLY (OUTPATIENT)
Dept: FAMILY MEDICINE CLINIC | Age: 59
End: 2019-01-28

## 2019-01-28 VITALS
HEART RATE: 64 BPM | OXYGEN SATURATION: 98 % | DIASTOLIC BLOOD PRESSURE: 76 MMHG | SYSTOLIC BLOOD PRESSURE: 118 MMHG | TEMPERATURE: 98.7 F

## 2019-01-28 PROCEDURE — G0299 HHS/HOSPICE OF RN EA 15 MIN: HCPCS

## 2019-01-28 NOTE — PROGRESS NOTES
ID    Called pt . He is doing the same . Pain in lower back . No fever, chills    Pt informed about cancellation of appt tomorrow due to my being away . He could  Follow up with ID at [address] [place] [address] or Dr Rox Gaffney . Pt voiced understanding . Does not need to at this time . But will see dr Rox Gaffney if something new were to come up .  Pt re scheduled for Monday 2/11 at 930 am

## 2019-01-29 ENCOUNTER — HOSPITAL ENCOUNTER (OUTPATIENT)
Dept: GENERAL RADIOLOGY | Age: 59
Discharge: HOME | End: 2019-01-29
Payer: COMMERCIAL

## 2019-01-29 DIAGNOSIS — M54.16 LUMBAR RADICULOPATHY: ICD-10-CM

## 2019-01-29 DIAGNOSIS — R52 PAIN: ICD-10-CM

## 2019-01-29 DIAGNOSIS — Z98.890 PERSONAL HISTORY OF SURGERY TO HEART AND GREAT VESSELS, PRESENTING HAZARDS TO HEALTH: ICD-10-CM

## 2019-01-29 PROCEDURE — 72100 X-RAY EXAM L-S SPINE 2/3 VWS: CPT

## 2019-02-04 ENCOUNTER — HOME CARE VISIT (OUTPATIENT)
Dept: SCHEDULING | Facility: HOME HEALTH | Age: 59
End: 2019-02-04
Payer: COMMERCIAL

## 2019-02-04 VITALS
OXYGEN SATURATION: 97 % | RESPIRATION RATE: 18 BRPM | DIASTOLIC BLOOD PRESSURE: 70 MMHG | HEART RATE: 78 BPM | TEMPERATURE: 99.1 F | SYSTOLIC BLOOD PRESSURE: 112 MMHG

## 2019-02-04 PROCEDURE — G0299 HHS/HOSPICE OF RN EA 15 MIN: HCPCS

## 2019-02-11 ENCOUNTER — OFFICE VISIT (OUTPATIENT)
Dept: FAMILY MEDICINE CLINIC | Age: 59
End: 2019-02-11

## 2019-02-11 ENCOUNTER — TELEPHONE (OUTPATIENT)
Dept: FAMILY MEDICINE CLINIC | Age: 59
End: 2019-02-11

## 2019-02-11 VITALS
WEIGHT: 197 LBS | DIASTOLIC BLOOD PRESSURE: 82 MMHG | HEIGHT: 72 IN | BODY MASS INDEX: 26.68 KG/M2 | HEART RATE: 71 BPM | RESPIRATION RATE: 16 BRPM | TEMPERATURE: 98.3 F | SYSTOLIC BLOOD PRESSURE: 126 MMHG | OXYGEN SATURATION: 97 %

## 2019-02-11 DIAGNOSIS — M46.46 DISCITIS OF LUMBAR REGION: ICD-10-CM

## 2019-02-11 DIAGNOSIS — A49.8 PROPIONIBACTERIUM INFECTION: ICD-10-CM

## 2019-02-11 DIAGNOSIS — E55.9 VITAMIN D DEFICIENCY: ICD-10-CM

## 2019-02-11 DIAGNOSIS — M46.26 OSTEOMYELITIS OF LUMBAR SPINE (HCC): ICD-10-CM

## 2019-02-11 DIAGNOSIS — M54.16 LUMBAR RADICULOPATHY: ICD-10-CM

## 2019-02-11 DIAGNOSIS — I48.0 PAROXYSMAL ATRIAL FIBRILLATION (HCC): ICD-10-CM

## 2019-02-11 DIAGNOSIS — M46.46 LUMBAR DISCITIS: ICD-10-CM

## 2019-02-11 DIAGNOSIS — M46.20 ACUTE OSTEOMYELITIS OF SPINE (HCC): Primary | ICD-10-CM

## 2019-02-11 DIAGNOSIS — R50.9 FEVER, UNSPECIFIED FEVER CAUSE: ICD-10-CM

## 2019-02-11 DIAGNOSIS — Z98.890 S/P LUMBAR LAMINECTOMY: ICD-10-CM

## 2019-02-11 DIAGNOSIS — J45.20 MILD INTERMITTENT ASTHMA WITHOUT COMPLICATION: ICD-10-CM

## 2019-02-11 NOTE — PROGRESS NOTES
Infectious Disease Progress Note    is a 59 y.o. male who is being seen for - L4/5 Osteomyelitis / Discitis. IMPRESSION:   · Pt feeling better overall,pain still present , localized,  less severe than before , no radiation down LE  · L4/5 osteomyelitis /discitis on MRI lumbar spine 1/10. Small fluid collections in R/para spinal musculature at level of L4 & in s/cutaneous soft tissue overlying spinous processes extending from L4 to S1 not significantly changed in size from previous MRI . Thicker rind of peripheral enhancement L4- S1 compared to previous MRI raising suspicion for infectious process ,  but no erosive change ,no prevertebral or epidural  abscess formation, ? Post surgical vs infectious etiology . D/w Dr Ananth Gibbs  ·  Some  improvement in pain symptoms since starting antibiotics, no significant improvement since restarting steroids po  · H/o Lumbar laminectomy/ discectomy on 11/28 /18 followed 3 weeks later by low grade temps, exacerbation of low back pain. · MRI done 12/28 non specific new edema L4/5 disc space with no obvious erosive change , no disc enhancement representing post surgical change rather than infection. · Initial ESR 60/CRP 73.22 on 12/28, ESR 55 on 1/8, 48 on 1/11  · Last ESR 29, CRP 5.55 on 1/30/19 done by Dr Ananth Gibbs , pt was not on prednisone at time  . · Prednisone 20 mg 1/30 to 2/7 ,now on 10 mg prednisone. 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 .       Pt would need  follow up imaging after completion of antibiotics.    · Weekly CBC , CMP on Mondays fax to [phone], call with abnormal results [phone]  · Out patient follow up in Clinic on  at 1230 pm, decision about continuation of therapy dependent on clinical picture , labs , ESR/ CRP  · Discussed plan of care with pt again, he is agreeable with plan of care         ICD-10-CM ICD-9-CM    1. Acute osteomyelitis of spine (HCC) M46.20 730.28    2. Lumbar radiculopathy M54.16 724.4    3. S/P lumbar laminectomy Z98.890 V45.89    4. Lumbar discitis M46.46 722.93    5. Osteomyelitis of lumbar spine (HCC) M46.26 730.28    6. Discitis of lumbar region M46.46 722.93    7. Propionibacterium infection A49.8 041.84    8. Paroxysmal atrial fibrillation (HCC) I48.0 427.31    9. Fever, unspecified fever cause R50.9 780.60    10. Vitamin D deficiency E55.9 268.9    11. Mild intermittent asthma without complication F54.32 862.76        I have discussed the diagnosis with the patient and the intended plan as seen in the above orders. The patient has received an after-visit summary and questions were answered concerning future plans. I have discussed medication side effects and warnings with the patient as well. Reviewed test results at length with patient          Subjective:     Pt seen on hospital follow up    Admitted- 19   Discharged - 19   . Reports pain at night . Better during day. Pain is now localized to lower back , no radiation anteriorly , no radiation down lower extremities    Review of Systems:  A comprehensive review of systems was negative except for that written in the History of Present Illness. 10 point ROS obtained . All other systems negative . Objective:     Blood pressure 126/82, pulse 71, temperature 98.3 °F (36.8 °C), temperature source Rectal, resp. rate 16, height 6' (1.829 m), weight 197 lb (89.4 kg), SpO2 97 %.   Temp (24hrs), Av.2 °F (36.8 °C), Min:97.9 °F (36.6 °C), Max:98.4 °F (36.9 °C)      Patient Vitals for the past 24 hrs:   Temp Pulse Resp BP SpO2   19 0917 97.9 °F (36.6 °C) 85 16 136/76 99 %   01/14/19 0436 98.3 °F (36.8 °C) 63 16 106/58 95 %   01/13/19 2025 98.1 °F (36.7 °C) 75 16 125/83 99 %   01/13/19 1521 98.3 °F (36.8 °C) 72 16 104/69 95 %   01/13/19 1216 98.4 °F (36.9 °C) 70 16 103/52 99 %         Lines:  Peripheral IV:       Physical Exam:   General:  Alert, cooperative,    Eyes:  Sclera anicteric. Pupils equally round and reactive to light. Mouth/Throat: Mucous membranes normal, oral pharynx clear   Neck: Supple   Lungs:   Clear to auscultation bilaterally, good effort   CV:  Regular rate and rhythm,no murmur, click, rub or gallop   Abdomen:   Soft, non-tender. bowel sounds normal. non-distended   Extremities: No  edema   Skin: Skin color, texture, turgor normal. no acute rash or lesions   Lymph nodes: Cervical and supraclavicular normal   Musculoskeletal: No swelling or deformity, non tender lower back on palpation , minimal erythema of skin ? Secondary to heating pad , no swelling , no warmth to touch, non tender   Lines/Devices:  Intact, no erythema, drainage or tenderness   Psych: Alert and oriented. Data Review:   Studies:      Lab Results   Component Value Date/Time    Culture result: LIGHT PROPIONIBACTERIUM ACNES (A) 01/11/2019 04:40 PM    Culture result: Culture performed on Fluid swab specimen 01/11/2019 04:40 PM    Culture result: NO GROWTH IN 4 DAYS, AEROBICALLY 01/11/2019 04:40 PM    Culture result: NO GROWTH 7 DAYS 01/08/2019 11:24 AM    Culture result: NO GROWTH 7 DAYS 01/08/2019 11:20 AM        XR Results (most recent):  Results from Hospital Encounter encounter on 01/29/19   XR SPINE LUMB 2 OR 3 V    Narrative INDICATION:  pain     Exam: AP, lateral and cone-down views of the lumbar spine. FINDINGS: There is no acute fracture or subluxation. The osseous structures are  diffusely demineralized. There is moderate narrowing of the L4-L5 intervertebral  disc. No lytic or sclerotic osseous lesion is visualized.       Impression IMPRESSION: No acute fracture or subluxation. Patient Active Problem List   Diagnosis Code    Intermittent atrial fibrillation (HCC) I48.0    Vitamin D deficiency E55.9    Personal history of colonic polyps Z86.010    Herniated lumbar intervertebral disc M51.26    S/P lumbar laminectomy Z98.890    Acute osteomyelitis of spine ([facility] [address] M46.20     I have discussed the diagnosis with the patient and the intended plan as seen in the above orders. I have discussed medication side effects and warnings with the patient as well.     Reviewed test results at length with patient    Anti-infectives:      Vancomycin/ Cefepime- 1/12   Ertapenem IV 1/19     Tg Branham MD FACP

## 2019-02-11 NOTE — TELEPHONE ENCOUNTER
Called patient's home health company.  for office to call about new orders for patient. Need to add ESR and CRP to patient's lab orders for tomorrows 2/12/19 lab draw.

## 2019-02-11 NOTE — TELEPHONE ENCOUNTER
Spoke with narendra from [facility] and informed her that Per Dr. Gilda Sherman. Patient is to have a ESR and a CRP added to labs for 2/12/19

## 2019-02-11 NOTE — PATIENT INSTRUCTIONS
[employer] Activation    Thank you for requesting access to [employer]. Please follow the instructions below to securely access and download your online medical record. [employer] allows you to send messages to your doctor, view your test results, renew your prescriptions, schedule appointments, and more. How Do I Sign Up? 1. In your internet browser, go to [email or web address]. 2. Click on the First Time User? Click Here link in the Sign In box. You will see the New Member Sign Up page. 3. Enter your [employer] Access Code exactly as it appears below. You will not need to use this code after youve completed the sign-up process. If you do not sign up before the expiration date, you must request a new code. [employer] Access Code: [email or web address]  Expires: 2019 12:25 PM (This is the date your [employer] access code will )    4. Enter the last four digits of your Social Security Number (xxxx) and Date of Birth (mm/dd/yyyy) as indicated and click Submit. You will be taken to the next sign-up page. 5. Create a [employer] ID. This will be your [employer] login ID and cannot be changed, so think of one that is secure and easy to remember. 6. Create a [employer] password. You can change your password at any time. 7. Enter your Password Reset Question and Answer. This can be used at a later time if you forget your password. 8. Enter your e-mail address. You will receive e-mail notification when new information is available in [address] 9. Click Sign Up. You can now view and download portions of your medical record. 10. Click the Download Summary menu link to download a portable copy of your medical information. Additional Information    If you have questions, please visit the Frequently Asked Questions section of the [employer] website at [email or web address] Remember, [employer] is NOT to be used for urgent needs. For medical emergencies, dial 911.     Follow up  at 1230 pm

## 2019-02-12 ENCOUNTER — HOME CARE VISIT (OUTPATIENT)
Dept: SCHEDULING | Facility: HOME HEALTH | Age: 59
End: 2019-02-12
Payer: COMMERCIAL

## 2019-02-12 VITALS
RESPIRATION RATE: 18 BRPM | SYSTOLIC BLOOD PRESSURE: 118 MMHG | TEMPERATURE: 98.1 F | DIASTOLIC BLOOD PRESSURE: 60 MMHG | OXYGEN SATURATION: 99 % | HEART RATE: 60 BPM

## 2019-02-12 PROCEDURE — G0299 HHS/HOSPICE OF RN EA 15 MIN: HCPCS

## 2019-02-18 ENCOUNTER — HOME CARE VISIT (OUTPATIENT)
Dept: SCHEDULING | Facility: HOME HEALTH | Age: 59
End: 2019-02-18
Payer: COMMERCIAL

## 2019-02-18 VITALS
RESPIRATION RATE: 18 BRPM | TEMPERATURE: 98.7 F | HEART RATE: 60 BPM | DIASTOLIC BLOOD PRESSURE: 72 MMHG | SYSTOLIC BLOOD PRESSURE: 118 MMHG | OXYGEN SATURATION: 96 %

## 2019-02-18 PROCEDURE — G0299 HHS/HOSPICE OF RN EA 15 MIN: HCPCS

## 2019-02-25 ENCOUNTER — TELEPHONE (OUTPATIENT)
Dept: FAMILY MEDICINE CLINIC | Age: 59
End: 2019-02-25

## 2019-02-25 ENCOUNTER — HOME CARE VISIT (OUTPATIENT)
Dept: SCHEDULING | Facility: HOME HEALTH | Age: 59
End: 2019-02-25
Payer: COMMERCIAL

## 2019-02-25 PROCEDURE — G0299 HHS/HOSPICE OF RN EA 15 MIN: HCPCS

## 2019-02-25 NOTE — TELEPHONE ENCOUNTER
Patient called about orders for MRI. Informed patient I would pass the message on to Dr. Rohan Muniz. If MRI is needed will call scheduling for an apmt hopefully on 2/26/19 when patient will be in our office. Patient states he lives a long distance from this location. Message given to Dr. Rohan Muniz.     Dr. Rohan Muniz states she will reach out to patient.

## 2019-02-26 ENCOUNTER — OFFICE VISIT (OUTPATIENT)
Dept: FAMILY MEDICINE CLINIC | Age: 59
End: 2019-02-26

## 2019-02-26 VITALS
OXYGEN SATURATION: 95 % | HEART RATE: 83 BPM | RESPIRATION RATE: 15 BRPM | SYSTOLIC BLOOD PRESSURE: 110 MMHG | HEIGHT: 72 IN | TEMPERATURE: 97.4 F | BODY MASS INDEX: 27.63 KG/M2 | WEIGHT: 204 LBS | DIASTOLIC BLOOD PRESSURE: 72 MMHG

## 2019-02-26 VITALS
TEMPERATURE: 98.7 F | DIASTOLIC BLOOD PRESSURE: 70 MMHG | SYSTOLIC BLOOD PRESSURE: 132 MMHG | RESPIRATION RATE: 18 BRPM | HEART RATE: 73 BPM | OXYGEN SATURATION: 98 %

## 2019-02-26 DIAGNOSIS — M46.46 LUMBAR DISCITIS: ICD-10-CM

## 2019-02-26 DIAGNOSIS — M46.20 ACUTE OSTEOMYELITIS OF SPINE (HCC): Primary | ICD-10-CM

## 2019-02-26 DIAGNOSIS — M46.46 DISCITIS OF LUMBAR REGION: ICD-10-CM

## 2019-02-26 DIAGNOSIS — A49.8 PROPIONIBACTERIUM INFECTION: ICD-10-CM

## 2019-02-26 DIAGNOSIS — Z98.890 S/P LUMBAR LAMINECTOMY: ICD-10-CM

## 2019-02-26 DIAGNOSIS — M46.26 OSTEOMYELITIS OF LUMBAR SPINE (HCC): ICD-10-CM

## 2019-02-26 DIAGNOSIS — M54.16 LUMBAR RADICULOPATHY: ICD-10-CM

## 2019-02-26 DIAGNOSIS — M54.16 LUMBAR RADICULOPATHY, ACUTE: ICD-10-CM

## 2019-02-26 NOTE — PATIENT INSTRUCTIONS
[employer] Activation    Thank you for requesting access to [employer]. Please follow the instructions below to securely access and download your online medical record. [employer] allows you to send messages to your doctor, view your test results, renew your prescriptions, schedule appointments, and more. How Do I Sign Up? 1. In your internet browser, go to [email or web address]. 2. Click on the First Time User? Click Here link in the Sign In box. You will see the New Member Sign Up page. 3. Enter your [employer] Access Code exactly as it appears below. You will not need to use this code after youve completed the sign-up process. If you do not sign up before the expiration date, you must request a new code. [employer] Access Code: [email or web address]  Expires: 2019 11:17 AM (This is the date your [employer] access code will )    4. Enter the last four digits of your Social Security Number (xxxx) and Date of Birth (mm/dd/yyyy) as indicated and click Submit. You will be taken to the next sign-up page. 5. Create a [employer] ID. This will be your [employer] login ID and cannot be changed, so think of one that is secure and easy to remember. 6. Create a [employer] password. You can change your password at any time. 7. Enter your Password Reset Question and Answer. This can be used at a later time if you forget your password. 8. Enter your e-mail address. You will receive e-mail notification when new information is available in [address] 9. Click Sign Up. You can now view and download portions of your medical record. 10. Click the Download Summary menu link to download a portable copy of your medical information. Additional Information    If you have questions, please visit the Frequently Asked Questions section of the [employer] website at [email or web address] Remember, [employer] is NOT to be used for urgent needs. For medical emergencies, dial 911.

## 2019-02-26 NOTE — PROGRESS NOTES
Infectious Disease Progress Note    is a 59 y.o. male who is being seen for - L4/5 Osteomyelitis / Discitis. IMPRESSION:   · Pt feeling better overall pain much less  than before, no radiation down LE  · L4/5 osteomyelitis /discitis on MRI lumbar spine 1/10. Small fluid collections in R/para spinal musculature at level of L4 & in s/cutaneous soft tissue overlying spinous processes extending from L4 to S1 not significantly changed in size from previous MRI . Thicker rind of peripheral enhancement L4- S1 compared to previous MRI raising suspicion for infectious process ,  but no erosive change ,no prevertebral or epidural  abscess formation, ? Post surgical vs infectious etiology . D/w Dr Travon Riley  ·  [address] culture + for  Light Propionibacterium acnes 1/11  · H/o Lumbar laminectomy/ discectomy on 11/28 /18 followed 3 weeks later by low grade temps, exacerbation of low back pain. · MRI done 12/28 non specific new edema L4/5 disc space with no obvious erosive change , no disc enhancement representing post surgical change rather than infection. ·  WBC 5.4, BUN/ CR 14/0.6  · Initial ESR 60/CRP 73.22 on 12/28, ESR 55 on 1/8, 48 on 1/11  · Last ESR  18, CRP 0.58 on 2/12, ESR 29, CRP 5.55 on 1/30/19 done by Dr Travon Riley , pt was not on prednisone at time  . · Prednisone 20 mg 1/30 to 2/7 ,now on 10 mg prednisone. ,   · H/o Paroxysmal afib now in normal sinus rhythm  · Asthma stable,past h/o exposure to histoplasmosis  · Spermatocele surgery in [facility],57 no post op complications ,well healed       PLAN:      · Continue  Ertapenem IV , first dose started 1/19              Ertapenem 1 G IV daily x  end date 3/2/19 , extend 1 more week to 3/9. Total 7 weeks  · Plan for7 weeks of antimicrobial therapy IV, with biweekly follow up & re evauation at 4 week karen. .       Pt would need  follow up imaging after completion of antibiotics.    · MRI to be planned for 3/19/19  · Weekly CBC , CMP on Mondays fax to [phone], call with abnormal results [phone]  · Out patient follow up in [facility]  · Discussed plan of care with pt again, he is agreeable with plan of care         ICD-10-CM ICD-9-CM    1. Acute osteomyelitis of spine (HCC) M46.20 730.28    2. Lumbar radiculopathy M54.16 724.4    3. Propionibacterium infection A49.8 041.84    4. Discitis of lumbar region M46.46 722.93    5. Lumbar discitis M46.46 722.93    6. S/P lumbar laminectomy Z98.890 V45.89    7. Osteomyelitis of lumbar spine (HCC) M46.26 730.28    8. Lumbar radiculopathy, acute M54.16 724.4        I have discussed the diagnosis with the patient and the intended plan as seen in the above orders. The patient has received an after-visit summary and questions were answered concerning future plans. I have discussed medication side effects and warnings with the patient as well. Reviewed test results at length with patient            Subjective:     Pt seen on hospital follow up    Admitted- 19   Discharged - 19   Pt here on follow up  . Reports pain on changing position getting out of bed, twisting . Feels better during day. Pain has ocalized to lower back more on right side , no radiation anteriorly , no radiation down lower extremities. Overall better , muscle spasm  Pt has soft stool  About 2 times a day for past 3 days. Other wise normal.  Review of Systems:  A comprehensive review of systems was negative except for that written in the History of Present Illness. 10 point ROS obtained . All other systems negative . Objective: There were no vitals taken for this visit.   Temp (24hrs), Av.2 °F (36.8 °C), Min:97.9 °F (36.6 °C), Max:98.4 °F (36.9 °C)      Patient Vitals for the past 24 hrs:   Temp Pulse Resp BP SpO2   19 0917 97.9 °F (36.6 °C) 85 16 136/76 99 %   19 0436 98.3 °F (36.8 °C) 63 16 106/58 95 %   19 98.1 °F (36.7 °C) 75 16 125/83 99 %   19 1521 98.3 °F (36.8 °C) 72 16 104/69 95 %   19 1216 98.4 °F (36.9 °C) 70 16 103/52 99 %         Lines:  Peripheral IV:       Physical Exam:   General:  Alert, cooperative,    Eyes:  Sclera anicteric. Pupils equally round and reactive to light. Mouth/Throat: Mucous membranes normal, oral pharynx clear   Neck: Supple   Lungs:   Clear to auscultation bilaterally, good effort   CV:  Regular rate and rhythm,no murmur, click, rub or gallop   Abdomen:   Soft, non-tender. bowel sounds normal. non-distended   Extremities: No  edema   Skin: Skin color, texture, turgor normal. no acute rash or lesions   Lymph nodes: Cervical and supraclavicular normal   Musculoskeletal: No swelling or deformity, non tender lower back on palpation , minimal erythema of skin ? Secondary to heating pad , no swelling , no warmth to touch, non tender   Lines/Devices:  Intact, no erythema, drainage or tenderness   Psych: Alert and oriented. Data Review:   Studies:      Lab Results   Component Value Date/Time    Culture result: LIGHT PROPIONIBACTERIUM ACNES (A) 01/11/2019 04:40 PM    Culture result: Culture performed on Fluid swab specimen 01/11/2019 04:40 PM    Culture result: NO GROWTH IN 4 DAYS, AEROBICALLY 01/11/2019 04:40 PM    Culture result: NO GROWTH 7 DAYS 01/08/2019 11:24 AM    Culture result: NO GROWTH 7 DAYS 01/08/2019 11:20 AM        XR Results (most recent):  Results from Hospital Encounter encounter on 01/29/19   XR SPINE LUMB 2 OR 3 V    Narrative INDICATION:  pain     Exam: AP, lateral and cone-down views of the lumbar spine. FINDINGS: There is no acute fracture or subluxation. The osseous structures are  diffusely demineralized. There is moderate narrowing of the L4-L5 intervertebral  disc. No lytic or sclerotic osseous lesion is visualized. Impression IMPRESSION: No acute fracture or subluxation.          Patient Active Problem List   Diagnosis Code    Intermittent atrial fibrillation (HCC) I48.0    Vitamin D deficiency E55.9    Personal history of colonic polyps Z86.010    Herniated lumbar intervertebral disc M51.26    S/P lumbar laminectomy Z98.890    Acute osteomyelitis of spine (HCC) M46.20    Propionibacterium infection A49.8     I have discussed the diagnosis with the patient and the intended plan as seen in the above orders. I have discussed medication side effects and warnings with the patient as well.     Reviewed test results at length with patient    Anti-infectives:      Vancomycin/ Cefepime- 1/12   Ertapenem IV 1/19     Kirby Auguste MD FACP

## 2019-03-04 ENCOUNTER — HOME CARE VISIT (OUTPATIENT)
Dept: SCHEDULING | Facility: HOME HEALTH | Age: 59
End: 2019-03-04
Payer: COMMERCIAL

## 2019-03-04 ENCOUNTER — TELEPHONE (OUTPATIENT)
Dept: FAMILY MEDICINE CLINIC | Age: 59
End: 2019-03-04

## 2019-03-04 VITALS
DIASTOLIC BLOOD PRESSURE: 62 MMHG | SYSTOLIC BLOOD PRESSURE: 100 MMHG | HEART RATE: 61 BPM | OXYGEN SATURATION: 99 % | TEMPERATURE: 99.4 F

## 2019-03-04 PROCEDURE — G0299 HHS/HOSPICE OF RN EA 15 MIN: HCPCS

## 2019-03-04 NOTE — TELEPHONE ENCOUNTER
----- Message from Sangeetha Haddad sent at 3/4/2019 11:55 AM EST -----  Regarding: Dr. Hilario Todd, home nurse with [facility] would like a call back to see if pts pick line can come out Monday, 03/11 at pts next home visit after antibiotic has been completed.   [address] best contact  [phone]

## 2019-03-05 ENCOUNTER — HOME CARE VISIT (OUTPATIENT)
Dept: HOME HEALTH SERVICES | Facility: HOME HEALTH | Age: 59
End: 2019-03-05
Payer: COMMERCIAL

## 2019-03-11 ENCOUNTER — HOME CARE VISIT (OUTPATIENT)
Dept: SCHEDULING | Facility: HOME HEALTH | Age: 59
End: 2019-03-11
Payer: COMMERCIAL

## 2019-03-11 ENCOUNTER — TELEPHONE (OUTPATIENT)
Dept: FAMILY MEDICINE CLINIC | Age: 59
End: 2019-03-11

## 2019-03-11 VITALS
TEMPERATURE: 98.8 F | DIASTOLIC BLOOD PRESSURE: 78 MMHG | RESPIRATION RATE: 18 BRPM | SYSTOLIC BLOOD PRESSURE: 120 MMHG | OXYGEN SATURATION: 97 % | HEART RATE: 61 BPM

## 2019-03-11 DIAGNOSIS — A49.8 PROPIONIBACTERIUM INFECTION: ICD-10-CM

## 2019-03-11 DIAGNOSIS — M46.20 ACUTE OSTEOMYELITIS OF SPINE (HCC): Primary | ICD-10-CM

## 2019-03-11 DIAGNOSIS — Z98.890 S/P LUMBAR LAMINECTOMY: ICD-10-CM

## 2019-03-11 PROCEDURE — G0299 HHS/HOSPICE OF RN EA 15 MIN: HCPCS

## 2019-03-11 NOTE — TELEPHONE ENCOUNTER
Lm for patient to call central scheduling. Patient stated he would like apmt around 11am, however, when calling [facility] there was not any apmts within his time frame.

## 2019-03-11 NOTE — TELEPHONE ENCOUNTER
Spoke with guerita informed patient I would get order from     Spoke with Dr. Yuni Colin, she states order will be placed for MRI, LP will be determined by NS.  Will call to schedule this apmt, Patient requested apmt to be around 11am due to an apmt with NS.

## 2019-03-11 NOTE — TELEPHONE ENCOUNTER
Pt is checking on MRI of lumbar spine   No one has contacted him     Best number to reach him is [phone]

## 2019-03-18 ENCOUNTER — TELEPHONE (OUTPATIENT)
Dept: CARDIOLOGY CLINIC | Age: 59
End: 2019-03-18

## 2019-03-22 ENCOUNTER — HOSPITAL ENCOUNTER (OUTPATIENT)
Dept: MRI IMAGING | Age: 59
Discharge: HOME | End: 2019-03-22
Attending: INTERNAL MEDICINE
Payer: COMMERCIAL

## 2019-03-22 DIAGNOSIS — Z98.890 S/P LUMBAR LAMINECTOMY: ICD-10-CM

## 2019-03-22 DIAGNOSIS — A49.8 PROPIONIBACTERIUM INFECTION: ICD-10-CM

## 2019-03-22 DIAGNOSIS — M46.20 ACUTE OSTEOMYELITIS OF SPINE (HCC): ICD-10-CM

## 2019-03-22 PROCEDURE — 74011636320 HC RX REV CODE- 636/320: Performed by: INTERNAL MEDICINE

## 2019-03-22 PROCEDURE — 72158 MRI LUMBAR SPINE W/O & W/DYE: CPT

## 2019-03-22 PROCEDURE — A9575 INJ GADOTERATE MEGLUMI 0.1ML: HCPCS | Performed by: INTERNAL MEDICINE

## 2019-03-22 RX ADMIN — GADOTERATE MEGLUMINE 17 ML: 376.9 INJECTION INTRAVENOUS at 16:00

## 2019-04-19 ENCOUNTER — OFFICE VISIT (OUTPATIENT)
Dept: CARDIOLOGY CLINIC | Age: 59
End: 2019-04-19

## 2019-04-19 VITALS
RESPIRATION RATE: 12 BRPM | HEART RATE: 59 BPM | DIASTOLIC BLOOD PRESSURE: 80 MMHG | OXYGEN SATURATION: 97 % | SYSTOLIC BLOOD PRESSURE: 114 MMHG | WEIGHT: 202 LBS | HEIGHT: 72 IN | BODY MASS INDEX: 27.36 KG/M2

## 2019-04-19 DIAGNOSIS — Z98.890 S/P LUMBAR LAMINECTOMY: ICD-10-CM

## 2019-04-19 DIAGNOSIS — M46.20 ACUTE OSTEOMYELITIS OF SPINE (HCC): ICD-10-CM

## 2019-04-19 DIAGNOSIS — I48.0 INTERMITTENT ATRIAL FIBRILLATION (HCC): ICD-10-CM

## 2019-04-19 DIAGNOSIS — I48.0 PAROXYSMAL ATRIAL FIBRILLATION (HCC): Primary | ICD-10-CM

## 2019-04-19 RX ORDER — METOPROLOL TARTRATE 50 MG/1
TABLET ORAL AS NEEDED
COMMUNITY
End: 2019-04-19 | Stop reason: SDUPTHER

## 2019-04-19 RX ORDER — METOPROLOL SUCCINATE 100 MG/1
TABLET, EXTENDED RELEASE ORAL
Qty: 90 TAB | Refills: 3 | Status: SHIPPED | OUTPATIENT
Start: 2019-04-19 | End: 2019-10-19 | Stop reason: SDUPTHER

## 2019-04-19 RX ORDER — FLECAINIDE ACETATE 100 MG/1
100 TABLET ORAL AS NEEDED
COMMUNITY
End: 2019-04-19 | Stop reason: ALTCHOICE

## 2019-04-19 RX ORDER — ASPIRIN 81 MG/1
TABLET ORAL DAILY
COMMUNITY
End: 2019-04-19

## 2019-04-19 RX ORDER — METOPROLOL TARTRATE 50 MG/1
50 TABLET ORAL AS NEEDED
Qty: 30 TAB | Refills: 2 | Status: SHIPPED | OUTPATIENT
Start: 2019-04-19 | End: 2021-11-11 | Stop reason: SDUPTHER

## 2019-04-19 NOTE — PROGRESS NOTES
Nany Vera is a 59 y.o. male is here for routine f/u. Hx paroxysmal afib/lone afib (structurally normal heart), onset 2011. Initial episode with IV cardizem, to NSR and on metoprolol XL 50. Had w/u with stress MPI and Echo/dopper--normal.  Recurrent episode, and given po flecainide with restoration of NSR. Previous Cardiologist opted for \"pill in pocket\" approach--takes Metoprolol XL 50mg every day, has metoprolol tartrate and flecainide to take if recurrent sustained episode. Had  episode(s) triggered by staying in cabin in [place] at [facility], no problems since as takes diamox prior. Last stress test 2 yrs ago. Takes ASA 81, CHADs2-VASC is 0. No hx hypertension, DM, CHF, valvular, other. Mild dyslipidemia, dietary rx. S/p lumbar sgy (foot drop), post-op developed osteomyelitis--ID, PICC, IV antibiotics--now completed and starting PT. Had episode of afib x 12 hrs a month ago--took metoprolol 50mg IR--resolved. The patient denies chest pain/ shortness of breath, orthopnea, PND, LE edema, syncope, presyncope or fatigue.        Patient Active Problem List    Diagnosis Date Noted    Propionibacterium infection 02/11/2019    Acute osteomyelitis of spine ([facility] 01/11/2019    Herniated lumbar intervertebral disc 11/28/2018    S/P lumbar laminectomy 11/28/2018    Vitamin D deficiency     Personal history of colonic polyps     Intermittent atrial fibrillation ([address] 01/16/2017      Mounika Montes MD  Past Medical History:   Diagnosis Date    Asthma, mild intermittent     Family history of prostate cancer     Grandfather    Foot drop, left     Intermittent atrial fibrillation ([address] 1/16/2017    Left hydrocele     Lumbar herniated disc     Personal history of colonic polyps     Negative colonoscopy 2015    Vitamin D deficiency       Past Surgical History:   Procedure Laterality Date    COLONOSCOPY N/A 6/15/2018    COLONOSCOPY performed by Aisha Borden MD at [facility]    COLORECTAL SCRN; HI RISK IND  6/15/2018         HX COLONOSCOPY  06/11/2015    HX ORTHOPAEDIC      left knee surgery meniscectomy repair    HX UROLOGICAL  2018    hydrocele repair    IR ASP DISK PERC  1/11/2019     No Known Allergies   Family History   Problem Relation Age of Onset    Cancer Mother         breast    Cancer Father         colon      Social History     Socioeconomic History    Marital status:      Spouse name: Not on file    Number of children: Not on file    Years of education: Not on file    Highest education level: Not on file   Occupational History    Not on file   Social Needs    Financial resource strain: Not on file    Food insecurity:     Worry: Not on file     Inability: Not on file    Transportation needs:     Medical: Not on file     Non-medical: Not on file   Tobacco Use    Smoking status: Never Smoker    Smokeless tobacco: Never Used   Substance and Sexual Activity    Alcohol use:  Yes     Alcohol/week: 1.2 oz     Types: 2 Glasses of wine per week     Comment: 2 glasses aaron daily    Drug use: No    Sexual activity: Not on file   Lifestyle    Physical activity:     Days per week: Not on file     Minutes per session: Not on file    Stress: Not on file   Relationships    Social connections:     Talks on phone: Not on file     Gets together: Not on file     Attends Yarsani service: Not on file     Active member of club or organization: Not on file     Attends meetings of clubs or organizations: Not on file     Relationship status: Not on file    Intimate partner violence:     Fear of current or ex partner: Not on file     Emotionally abused: Not on file     Physically abused: Not on file     Forced sexual activity: Not on file   Other Topics Concern    Not on file   Social History Narrative    Not on file      Current Outpatient Medications   Medication Sig    metoprolol succinate (TOPROL-XL) 100 mg tablet 1/2 to 1 tab daily as directed    metoprolol tartrate (LOPRESSOR) 50 mg tablet Take 1 Tab by mouth as needed (afib episode). Indications: as needed for atrial fibrillation    meloxicam (MOBIC) 15 mg tablet Take 1 Tab by mouth daily. (Patient taking differently: Take 15 mg by mouth daily as needed.)    omeprazole (PRILOSEC) 20 mg capsule Take 1 Cap by mouth daily. (Patient taking differently: Take 20 mg by mouth daily as needed.)    acetaminophen (TYLENOL) 500 mg tablet Take 500 mg by mouth every six (6) hours as needed for Pain. takes 1-2 tabs by mouth    cholecalciferol (VITAMIN D3) 1,000 unit tablet Take 2 Tabs by mouth daily.  albuterol (PROVENTIL HFA, VENTOLIN HFA, PROAIR HFA) 90 mcg/actuation inhaler Take 1 Puff by inhalation every six (6) hours as needed for Wheezing. Indications: BRONCHOSPASM PREVENTION    oxyCODONE-acetaminophen (PERCOCET) 5-325 mg per tablet Take 1 Tab by mouth every six (6) hours as needed. Max Daily Amount: 4 Tabs. (Patient taking differently: Take 1 Tab by mouth every six (6) hours as needed for Pain. prn for pain)     No current facility-administered medications for this visit. Review of Symptoms:    CONST  No weight change. No fever, chills, sweats    ENT No visual changes, URI sx, sore throat    CV  See HPI   RESP  No cough, or sputum, wheezing. Also see HPI   GI  No abdominal pain or change in bowel habits. No heartburn or dysphagia. No melena or rectal bleeding.   No dysuria, urgency, frequency, hematuria   MSKEL  No joint pain, swelling. No muscle pain. SKIN  No rash or lesions. NEURO  No headache, syncope, or seizure. No weakness, loss of sensation, or paresthesias. PSYCH  No low mood or depression  No anxiety. HE/LYMPH  No easy bruising, abnormal bleeding, or enlarged glands.         Physical ExamPhysical Exam:    Visit Vitals  /80 (BP 1 Location: Left arm, BP Patient Position: Sitting)   Pulse (!) 59   Resp 12   Ht 6' (1.829 m)   Wt 202 lb (91.6 kg)   SpO2 97%   BMI 27.40 kg/m²     Gen: NAD  HEENT:  PERRL, throat clear  Neck: no adenopathy, no thyromegaly, no JVD   Heart:  Regular,Nl S1S2,  no murmur, gallop or rub.   Lungs:  clear  Abdomen:   Soft, non-tender, bowel sounds are active.   Extremities:  No edema  Pulse: symmetric  Neuro: A&O times 3, No focal neuro deficits    Cardiographics    ECG: sinus sharonda 54, IRBBB/LAFB, LAE    Labs:   Lab Results   Component Value Date/Time    Sodium 141 03/11/2019 10:15 AM    Sodium 139 03/04/2019 12:45 PM    Sodium 148 (H) 02/25/2019 10:35 AM    Sodium 139 02/18/2019 11:10 AM    Sodium 139 02/12/2019 02:43 PM    Potassium 4.5 03/11/2019 10:15 AM    Potassium 4.4 03/04/2019 12:45 PM    Potassium 2.5 (LL) 02/25/2019 10:35 AM    Potassium 4.6 02/18/2019 11:10 AM    Potassium 5.0 02/12/2019 02:43 PM    Chloride 103 03/11/2019 10:15 AM    Chloride 103 03/04/2019 12:45 PM    Chloride 115 (H) 02/25/2019 10:35 AM    Chloride 104 02/18/2019 11:10 AM    Chloride 103 02/12/2019 02:43 PM    CO2 29 03/11/2019 10:15 AM    CO2 28 03/04/2019 12:45 PM    CO2 21 02/25/2019 10:35 AM    CO2 30 02/18/2019 11:10 AM    CO2 30 02/12/2019 02:43 PM    Anion gap 9 03/11/2019 10:15 AM    Anion gap 8 03/04/2019 12:45 PM    Anion gap 12 02/25/2019 10:35 AM    Anion gap 5 02/18/2019 11:10 AM    Anion gap 6 02/12/2019 02:43 PM    Glucose 94 03/11/2019 10:15 AM    Glucose 97 03/04/2019 12:45 PM    Glucose 85 02/25/2019 10:35 AM    Glucose 84 02/18/2019 11:10 AM    Glucose 96 02/12/2019 02:43 PM    BUN 19 03/11/2019 10:15 AM    BUN 14 03/04/2019 12:45 PM    BUN 14 02/25/2019 10:35 AM    BUN 17 02/18/2019 11:10 AM    BUN 23 (H) 02/12/2019 02:43 PM    Creatinine 0.99 03/11/2019 10:15 AM    Creatinine 0.82 03/04/2019 12:45 PM    Creatinine 0.60 (L) 02/25/2019 10:35 AM    Creatinine 0.92 02/18/2019 11:10 AM    Creatinine 0.77 02/12/2019 02:43 PM    BUN/Creatinine ratio 19 03/11/2019 10:15 AM    BUN/Creatinine ratio 17 03/04/2019 12:45 PM    BUN/Creatinine ratio 23 (H) 02/25/2019 10:35 AM    BUN/Creatinine ratio 18 02/18/2019 11:10 AM    BUN/Creatinine ratio 30 (H) 02/12/2019 02:43 PM    GFR est AA >60 03/11/2019 10:15 AM    GFR est AA >60 03/04/2019 12:45 PM    GFR est AA >60 02/25/2019 10:35 AM    GFR est AA >60 02/18/2019 11:10 AM    GFR est AA >60 02/12/2019 02:43 PM    GFR est non-AA >60 03/11/2019 10:15 AM    GFR est non-AA >60 03/04/2019 12:45 PM    GFR est non-AA >60 02/25/2019 10:35 AM    GFR est non-AA >60 02/18/2019 11:10 AM    GFR est non-AA >60 02/12/2019 02:43 PM    Calcium 8.7 03/11/2019 10:15 AM    Calcium 8.7 03/04/2019 12:45 PM    Calcium 5.4 (LL) 02/25/2019 10:35 AM    Calcium 8.5 02/18/2019 11:10 AM    Calcium 8.2 (L) 02/12/2019 02:43 PM    Bilirubin, total 0.8 03/11/2019 10:15 AM    Bilirubin, total 0.6 03/04/2019 12:45 PM    Bilirubin, total 0.3 02/25/2019 10:35 AM    Bilirubin, total 0.7 02/18/2019 11:10 AM    Bilirubin, total 0.6 02/12/2019 02:43 PM    AST (SGOT) 29 03/11/2019 10:15 AM    AST (SGOT) 26 03/04/2019 12:45 PM    AST (SGOT) 14 (L) 02/25/2019 10:35 AM    AST (SGOT) 21 02/18/2019 11:10 AM    AST (SGOT) 17 02/12/2019 02:43 PM    Alk. phosphatase 113 03/11/2019 10:15 AM    Alk. phosphatase 105 03/04/2019 12:45 PM    Alk. phosphatase 68 02/25/2019 10:35 AM    Alk. phosphatase 97 02/18/2019 11:10 AM    Alk.  phosphatase 89 02/12/2019 02:43 PM    Protein, total 7.1 03/11/2019 10:15 AM    Protein, total 7.1 03/04/2019 12:45 PM    Protein, total 4.3 (L) 02/25/2019 10:35 AM    Protein, total 6.8 02/18/2019 11:10 AM    Protein, total 6.8 02/12/2019 02:43 PM    Albumin 3.7 03/11/2019 10:15 AM    Albumin 3.5 03/04/2019 12:45 PM    Albumin 2.1 (L) 02/25/2019 10:35 AM    Albumin 3.3 (L) 02/18/2019 11:10 AM    Albumin 3.3 (L) 02/12/2019 02:43 PM    Globulin 3.4 03/11/2019 10:15 AM    Globulin 3.6 03/04/2019 12:45 PM    Globulin 2.2 02/25/2019 10:35 AM    Globulin 3.5 02/18/2019 11:10 AM    Globulin 3.5 02/12/2019 02:43 PM    A-G Ratio 1.1 03/11/2019 10:15 AM A-G Ratio 1.0 (L) 03/04/2019 12:45 PM    A-G Ratio 1.0 (L) 02/25/2019 10:35 AM    A-G Ratio 0.9 (L) 02/18/2019 11:10 AM    A-G Ratio 0.9 (L) 02/12/2019 02:43 PM    ALT (SGPT) 29 03/11/2019 10:15 AM    ALT (SGPT) 26 03/04/2019 12:45 PM    ALT (SGPT) 13 02/25/2019 10:35 AM    ALT (SGPT) 20 02/18/2019 11:10 AM    ALT (SGPT) 27 02/12/2019 02:43 PM     No results found for: CPK, CPKX, CPX  Lab Results   Component Value Date/Time    Cholesterol, total 208 (H) 10/11/2018 12:00 PM    Cholesterol, total 221 (H) 08/01/2017 11:21 AM    HDL Cholesterol 56 10/11/2018 12:00 PM    HDL Cholesterol 67 08/01/2017 11:21 AM    LDL, calculated 128 (H) 10/11/2018 12:00 PM    LDL, calculated 136 (H) 08/01/2017 11:21 AM    Triglyceride 118 10/11/2018 12:00 PM    Triglyceride 91 08/01/2017 11:21 AM     No results found for this or any previous visit. Assessment:         Patient Active Problem List    Diagnosis Date Noted    Propionibacterium infection 02/11/2019    Acute osteomyelitis of spine ([address] 01/11/2019    Herniated lumbar intervertebral disc 11/28/2018    S/P lumbar laminectomy 11/28/2018    Vitamin D deficiency     Personal history of colonic polyps     Intermittent atrial fibrillation ([address] 01/16/2017     Hx paroxysmal afib/lone afib (structurally normal heart), onset 2011. Initial episode with IV cardizem, to NSR and on metoprolol XL 50. Had w/u with stress MPI and Echo/dopper--normal.  Recurrent episode, and given po flecainide with restoration of NSR. Previous Cardiologist opted for \"pill in pocket\" approach--takes Metoprolol XL 50mg every day, has metoprolol tartrate and flecainide to take if recurrent sustained episode. Had  episode(s) triggered by staying in cabin in [place] at [facility], no problems since as takes diamox prior. Last stress test 2 yrs ago. Takes ASA 81, CHADs2-VASC is 0. No hx hypertension, DM, CHF, valvular, other. Mild dyslipidemia, dietary rx.  S/p lumbar sgy (foot drop), post-op developed osteomyelitis--ID, PICC, IV antibiotics--now completed and starting PT. Had episode of afib x 12 hrs a month ago--took metoprolol 50mg IR--resolved. Plan:     Stop ASA  Continue current meds/approach  Will avoid the flecainide, use daily Metoprolol XL 50 and prn IR   Continue current care and f/u in 12 months.     Marcus Keenan MD

## 2019-04-19 NOTE — PROGRESS NOTES
PATIENT ID VERIFIED WITH TWO PATIENT IDENTIFIERS. PATIENT MEDICATIONS REVIEWED AND APPROVED BY DR. Karishma Martell. MEDICATIONS THAT WERE REMOVED FROM THIS VISIT HAVE BEEN APPROVED BY DR. Karishma Martell.   Removed:  Kae CALDERON

## 2019-04-19 NOTE — PROGRESS NOTES
Verified patient with two patient identifiers. Chief Complaint   Patient presents with    Irregular Heart Beat     Follow up      1. Have you been to the ER, urgent care clinic since your last visit? Hospitalized since your last visit? Yes, 6/2018 endoscopy/colonscopy [facility] with Dr. Ruchi Hathaway, 10/2018 [facility] acquired hydrocele surgery, 11/2018 [facility] lumbar laminectomy, 1/2019 [facility] admission for Acute osteomyelitis of spine     2. Have you seen or consulted any other health care providers outside of the [address] [place] [address] since your last visit? Include any pap smears or colon screening.  no

## 2019-09-20 ENCOUNTER — HOSPITAL ENCOUNTER (OUTPATIENT)
Dept: MRI IMAGING | Age: 59
Discharge: HOME | End: 2019-09-20
Attending: NEUROLOGICAL SURGERY
Payer: COMMERCIAL

## 2019-09-20 DIAGNOSIS — M51.26 LUMBAR HERNIATED DISC: ICD-10-CM

## 2019-09-20 DIAGNOSIS — Z98.890 S/P LUMBAR LAMINECTOMY: ICD-10-CM

## 2019-09-20 PROCEDURE — 74011250636 HC RX REV CODE- 250/636: Performed by: NEUROLOGICAL SURGERY

## 2019-09-20 PROCEDURE — A9575 INJ GADOTERATE MEGLUMI 0.1ML: HCPCS | Performed by: NEUROLOGICAL SURGERY

## 2019-09-20 PROCEDURE — 72158 MRI LUMBAR SPINE W/O & W/DYE: CPT

## 2019-09-20 RX ORDER — GADOTERATE MEGLUMINE 376.9 MG/ML
20 INJECTION INTRAVENOUS
Status: COMPLETED | OUTPATIENT
Start: 2019-09-20 | End: 2019-09-20

## 2019-09-20 RX ADMIN — GADOTERATE MEGLUMINE 20 ML: 376.9 INJECTION INTRAVENOUS at 16:13

## 2019-10-19 DIAGNOSIS — I48.0 INTERMITTENT ATRIAL FIBRILLATION (HCC): ICD-10-CM

## 2019-10-21 RX ORDER — METOPROLOL SUCCINATE 100 MG/1
TABLET, EXTENDED RELEASE ORAL
Qty: 45 TAB | Refills: 1 | Status: SHIPPED | OUTPATIENT
Start: 2019-10-21 | End: 2020-03-31 | Stop reason: SDUPTHER

## 2019-11-01 PROBLEM — M51.26 HERNIATED LUMBAR INTERVERTEBRAL DISC: Status: RESOLVED | Noted: 2018-11-28 | Resolved: 2019-11-01

## 2019-11-01 PROBLEM — M46.20 ACUTE OSTEOMYELITIS OF SPINE (HCC): Status: RESOLVED | Noted: 2019-01-11 | Resolved: 2019-11-01

## 2019-11-01 PROBLEM — A49.8 PROPIONIBACTERIUM INFECTION: Status: RESOLVED | Noted: 2019-02-11 | Resolved: 2019-11-01

## 2020-07-16 LAB
CHOLEST SERPL-MCNC: 238 MG/DL
GLUCOSE SERPL-MCNC: 103 MG/DL (ref 65–100)
HDLC SERPL-MCNC: 57 MG/DL
LDLC SERPL CALC-MCNC: 159.4 MG/DL (ref 0–100)
TRIGL SERPL-MCNC: 108 MG/DL (ref ?–150)

## 2021-03-19 DIAGNOSIS — I48.0 INTERMITTENT ATRIAL FIBRILLATION (HCC): ICD-10-CM

## 2021-03-19 RX ORDER — METOPROLOL SUCCINATE 100 MG/1
TABLET, EXTENDED RELEASE ORAL
Qty: 45 TAB | Refills: 1 | Status: SHIPPED | OUTPATIENT
Start: 2021-03-19 | End: 2021-08-26 | Stop reason: SDUPTHER

## 2021-05-25 ENCOUNTER — OFFICE VISIT (OUTPATIENT)
Dept: CARDIOLOGY CLINIC | Age: 61
End: 2021-05-25
Payer: COMMERCIAL

## 2021-05-25 VITALS
WEIGHT: 207.4 LBS | HEIGHT: 72 IN | BODY MASS INDEX: 28.09 KG/M2 | OXYGEN SATURATION: 97 % | RESPIRATION RATE: 16 BRPM | TEMPERATURE: 98.2 F | SYSTOLIC BLOOD PRESSURE: 108 MMHG | DIASTOLIC BLOOD PRESSURE: 70 MMHG | HEART RATE: 52 BPM

## 2021-05-25 DIAGNOSIS — I48.0 PAROXYSMAL ATRIAL FIBRILLATION (HCC): Primary | ICD-10-CM

## 2021-05-25 PROCEDURE — 99214 OFFICE O/P EST MOD 30 MIN: CPT | Performed by: INTERNAL MEDICINE

## 2021-05-25 PROCEDURE — 93000 ELECTROCARDIOGRAM COMPLETE: CPT | Performed by: INTERNAL MEDICINE

## 2021-05-25 NOTE — PROGRESS NOTES
Identified pt with two pt identifiers(name and ). Reviewed record in preparation for visit and have obtained necessary documentation. Chief Complaint   Patient presents with    Irregular Heart Beat     f/u; pt denies new concerns since last visit      Vitals:    21 1025   BP: 108/70   Pulse: (!) 52   Resp: 16   Temp: 98.2 °F (36.8 °C)   TempSrc: Temporal   SpO2: 97%   Weight: 207 lb 6.4 oz (94.1 kg)   Height: 6' (1.829 m)   PainSc:   0 - No pain       Health Maintenance Review: Patient reminded of \"due or due soon\" health maintenance. I have asked the patient to contact his/her primary care provider (PCP) for follow-up on his/her health maintenance. Coordination of Care Questionnaire:  :   1) Have you been to an emergency room, urgent care, or hospitalized since your last visit? If yes, where when, and reason for visit? no       2. Have seen or consulted any other health care provider since your last visit? If yes, where when, and reason for visit? NO      Patient is accompanied by self I have received verbal consent from Pantera Garcia to discuss any/all medical information while they are present in the room.

## 2021-05-25 NOTE — PROGRESS NOTES
Radha Skelton is a 59 y.o. male is here for routine f/u. Hx paroxysmal afib/lone afib (structurally normal heart), onset 2011.  Initial episode with IV cardizem, to NSR and on metoprolol XL 50.  Had w/u with stress MPI and Echo/dopper--normal.  Recurrent episode, and given po flecainide with restoration of NSR.  Previous Cardiologist opted for \"pill in pocket\" approach--takes Metoprolol XL 50mg every day, has metoprolol tartrate and flecainide to take if recurrent sustained episode. Had  episode(s) triggered by staying in cabin in [place] at [facility], no problems since as takes diamox prior.  Last stress test 2 yrs ago. Rosina Mg ASA 81, CHADs2-VASC is 0.  No hx hypertension, DM, CHF, valvular, other.  Mild dyslipidemia, dietary rx. S/p lumbar sgy (foot drop), post-op developed osteomyelitis--ID, PICC, IV antibiotics, PT. Retired from [employer]. AFib ? flutter few weeks ago-took extra metoprolol, resolved. The patient denies chest pain/ shortness of breath, orthopnea, PND, LE edema, palpitations, syncope, presyncope or fatigue.        Patient Active Problem List    Diagnosis Date Noted    S/P lumbar laminectomy 11/28/2018    Vitamin D deficiency     Personal history of colonic polyps     Intermittent atrial fibrillation ([facility] [address] 01/16/2017      Lamine Marcus MD  Past Medical History:   Diagnosis Date    Asthma, mild intermittent     Family history of prostate cancer     Grandfather    Foot drop, left     Intermittent atrial fibrillation ([address] 1/16/2017    Left hydrocele     Lumbar herniated disc     Personal history of colonic polyps     Negative colonoscopy 2015    Vitamin D deficiency       Past Surgical History:   Procedure Laterality Date    COLONOSCOPY N/A 6/15/2018    COLONOSCOPY performed by Torrance Epley., MD at [facility] HI RISK IND  6/15/2018         HX COLONOSCOPY  06/11/2015    HX ORTHOPAEDIC      left knee surgery meniscectomy repair    HX UROLOGICAL  2018    hydrocele repair    IR ASP DISK PERC  1/11/2019     No Known Allergies   Family History   Problem Relation Age of Onset    Cancer Mother         breast    Cancer Father         colon      Social History     Socioeconomic History    Marital status:      Spouse name: Not on file    Number of children: Not on file    Years of education: Not on file    Highest education level: Not on file   Occupational History    Not on file   Tobacco Use    Smoking status: Never Smoker    Smokeless tobacco: Never Used   Substance and Sexual Activity    Alcohol use: Yes     Alcohol/week: 2.0 standard drinks     Types: 2 Glasses of wine per week     Comment: 2 glasses aaron daily    Drug use: No    Sexual activity: Not Currently   Other Topics Concern    Not on file   Social History Narrative    Not on file     Social Determinants of Health     Financial Resource Strain:     Difficulty of Paying Living Expenses:    Food Insecurity:     Worried About Running Out of Food in the Last Year:     Ran Out of Food in the Last Year:    Transportation Needs:     Lack of Transportation (Medical):  Lack of Transportation (Non-Medical):    Physical Activity:     Days of Exercise per Week:     Minutes of Exercise per Session:    Stress:     Feeling of Stress :    Social Connections:     Frequency of Communication with Friends and Family:     Frequency of Social Gatherings with Friends and Family:     Attends Church Services:     Active Member of Clubs or Organizations:     Attends Club or Organization Meetings:     Marital Status:    Intimate Partner Violence:     Fear of Current or Ex-Partner:     Emotionally Abused:     Physically Abused:     Sexually Abused:       Current Outpatient Medications   Medication Sig    metoprolol succinate (TOPROL-XL) 100 mg tablet 1/2 to 1 tab daily as directed    meloxicam (MOBIC) 15 mg tablet Take 1 Tab by mouth daily.     tadalafil (CIALIS) 20 mg tablet Take 1 Tab by mouth as needed (ED).  metoprolol tartrate (LOPRESSOR) 50 mg tablet Take 1 Tab by mouth as needed (afib episode). Indications: as needed for atrial fibrillation    cholecalciferol (VITAMIN D3) 1,000 unit tablet Take 2 Tabs by mouth daily.  albuterol (PROVENTIL HFA, VENTOLIN HFA, PROAIR HFA) 90 mcg/actuation inhaler Take 1 Puff by inhalation every six (6) hours as needed for Wheezing. Indications: BRONCHOSPASM PREVENTION     No current facility-administered medications for this visit. Review of Symptoms:    CONST  No weight change. No fever, chills, sweats    ENT No visual changes, URI sx, sore throat    CV  See HPI   RESP  No cough, or sputum, wheezing. Also see HPI   GI  No abdominal pain or change in bowel habits. No heartburn or dysphagia. No melena or rectal bleeding.   No dysuria, urgency, frequency, hematuria   MSKEL  No joint pain, swelling. No muscle pain. SKIN  No rash or lesions. NEURO  No headache, syncope, or seizure. No weakness, loss of sensation, or paresthesias. PSYCH  No low mood or depression  No anxiety. HE/LYMPH  No easy bruising, abnormal bleeding, or enlarged glands. Physical ExamPhysical Exam:    Visit Vitals  /70   Pulse (!) 52   Temp 98.2 °F (36.8 °C) (Temporal)   Resp 16   Ht 6' (1.829 m)   Wt 207 lb 6.4 oz (94.1 kg)   SpO2 97%   BMI 28.13 kg/m²     Gen: NAD  HEENT:  PERRL, throat clear  Neck: no adenopathy, no thyromegaly, no JVD   Heart:  Regular,Nl S1S2,  no murmur, gallop or rub. Lungs:  clear  Abdomen:   Soft, non-tender, bowel sounds are active.    Extremities:  No edema  Pulse: symmetric  Neuro: A&O times 3, No focal neuro deficits    Cardiographics    ECG: NSR, PRWP, IRBBB, LAFB, PVC;s    Labs:   Lab Results   Component Value Date/Time    Sodium 138 11/20/2020 02:28 PM    Sodium 141 11/05/2019 01:56 PM    Sodium 141 03/11/2019 10:15 AM    Sodium 139 03/04/2019 12:45 PM    Sodium 148 (H) 02/25/2019 10:35 AM    Potassium 4.6 11/20/2020 02:28 PM    Potassium 4.4 11/05/2019 01:56 PM    Potassium 4.5 03/11/2019 10:15 AM    Potassium 4.4 03/04/2019 12:45 PM    Potassium 2.5 (LL) 02/25/2019 10:35 AM    Chloride 100 11/20/2020 02:28 PM    Chloride 103 11/05/2019 01:56 PM    Chloride 103 03/11/2019 10:15 AM    Chloride 103 03/04/2019 12:45 PM    Chloride 115 (H) 02/25/2019 10:35 AM    CO2 22 11/20/2020 02:28 PM    CO2 25 11/05/2019 01:56 PM    CO2 29 03/11/2019 10:15 AM    CO2 28 03/04/2019 12:45 PM    CO2 21 02/25/2019 10:35 AM    Anion gap 9 03/11/2019 10:15 AM    Anion gap 8 03/04/2019 12:45 PM    Anion gap 12 02/25/2019 10:35 AM    Anion gap 5 02/18/2019 11:10 AM    Anion gap 6 02/12/2019 02:43 PM    Glucose 112 (H) 11/20/2020 02:28 PM    Glucose 103 (H) 07/16/2020 10:18 AM    Glucose 99 11/05/2019 01:56 PM    Glucose 94 03/11/2019 10:15 AM    Glucose 97 03/04/2019 12:45 PM    BUN 17 11/20/2020 02:28 PM    BUN 18 11/05/2019 01:56 PM    BUN 19 03/11/2019 10:15 AM    BUN 14 03/04/2019 12:45 PM    BUN 14 02/25/2019 10:35 AM    Creatinine 1.02 11/20/2020 02:28 PM    Creatinine 0.84 11/05/2019 01:56 PM    Creatinine 0.99 03/11/2019 10:15 AM    Creatinine 0.82 03/04/2019 12:45 PM    Creatinine 0.60 (L) 02/25/2019 10:35 AM    BUN/Creatinine ratio 17 11/20/2020 02:28 PM    BUN/Creatinine ratio 21 11/05/2019 01:56 PM    BUN/Creatinine ratio 19 03/11/2019 10:15 AM    BUN/Creatinine ratio 17 03/04/2019 12:45 PM    BUN/Creatinine ratio 23 (H) 02/25/2019 10:35 AM    GFR est AA 91 11/20/2020 02:28 PM    GFR est  11/05/2019 01:56 PM    GFR est AA >60 03/11/2019 10:15 AM    GFR est AA >60 03/04/2019 12:45 PM    GFR est AA >60 02/25/2019 10:35 AM    GFR est non-AA 78 11/20/2020 02:28 PM    GFR est non-AA 95 11/05/2019 01:56 PM    GFR est non-AA >60 03/11/2019 10:15 AM    GFR est non-AA >60 03/04/2019 12:45 PM    GFR est non-AA >60 02/25/2019 10:35 AM    Calcium 9.0 11/20/2020 02:28 PM    Calcium 9.0 11/05/2019 01:56 PM    Calcium 8.7 03/11/2019 10:15 AM    Calcium 8.7 03/04/2019 12:45 PM    Calcium 5.4 (LL) 02/25/2019 10:35 AM    Bilirubin, total 0.7 11/20/2020 02:28 PM    Bilirubin, total 0.5 11/05/2019 01:56 PM    Bilirubin, total 0.8 03/11/2019 10:15 AM    Bilirubin, total 0.6 03/04/2019 12:45 PM    Bilirubin, total 0.3 02/25/2019 10:35 AM    Alk. phosphatase 60 11/20/2020 02:28 PM    Alk. phosphatase 47 11/05/2019 01:56 PM    Alk. phosphatase 113 03/11/2019 10:15 AM    Alk. phosphatase 105 03/04/2019 12:45 PM    Alk.  phosphatase 68 02/25/2019 10:35 AM    Protein, total 7.0 11/20/2020 02:28 PM    Protein, total 6.2 11/05/2019 01:56 PM    Protein, total 7.1 03/11/2019 10:15 AM    Protein, total 7.1 03/04/2019 12:45 PM    Protein, total 4.3 (L) 02/25/2019 10:35 AM    Albumin 4.5 11/20/2020 02:28 PM    Albumin 4.1 11/05/2019 01:56 PM    Albumin 3.7 03/11/2019 10:15 AM    Albumin 3.5 03/04/2019 12:45 PM    Albumin 2.1 (L) 02/25/2019 10:35 AM    Globulin 3.4 03/11/2019 10:15 AM    Globulin 3.6 03/04/2019 12:45 PM    Globulin 2.2 02/25/2019 10:35 AM    Globulin 3.5 02/18/2019 11:10 AM    Globulin 3.5 02/12/2019 02:43 PM    A-G Ratio 1.8 11/20/2020 02:28 PM    A-G Ratio 2.0 11/05/2019 01:56 PM    A-G Ratio 1.1 03/11/2019 10:15 AM    A-G Ratio 1.0 (L) 03/04/2019 12:45 PM    A-G Ratio 1.0 (L) 02/25/2019 10:35 AM    ALT (SGPT) 21 11/20/2020 02:28 PM    ALT (SGPT) 14 11/05/2019 01:56 PM    ALT (SGPT) 29 03/11/2019 10:15 AM    ALT (SGPT) 26 03/04/2019 12:45 PM    ALT (SGPT) 13 02/25/2019 10:35 AM     No results found for: CPK, CPKX, CPX  Lab Results   Component Value Date/Time    Cholesterol, total 281 (H) 11/20/2020 02:28 PM    Cholesterol, total 238 (H) 07/16/2020 10:18 AM    Cholesterol, total 209 (H) 11/05/2019 01:56 PM    Cholesterol, total 208 (H) 10/11/2018 12:00 PM    Cholesterol, total 221 (H) 08/01/2017 11:21 AM    HDL Cholesterol 55 11/20/2020 02:28 PM    HDL Cholesterol 57 07/16/2020 10:18 AM    HDL Cholesterol 51 11/05/2019 01:56 PM    HDL Cholesterol 56 10/11/2018 12:00 PM    HDL Cholesterol 67 08/01/2017 11:21 AM    LDL, calculated 177 (H) 11/20/2020 02:28 PM    LDL, calculated 159.4 (H) 07/16/2020 10:18 AM    LDL, calculated 116 (H) 11/05/2019 01:56 PM    LDL, calculated 128 (H) 10/11/2018 12:00 PM    LDL, calculated 136 (H) 08/01/2017 11:21 AM    Triglyceride 259 (H) 11/20/2020 02:28 PM    Triglyceride 108 07/16/2020 10:18 AM    Triglyceride 211 (H) 11/05/2019 01:56 PM    Triglyceride 118 10/11/2018 12:00 PM    Triglyceride 91 08/01/2017 11:21 AM     No results found for this or any previous visit. Assessment:         Patient Active Problem List    Diagnosis Date Noted    S/P lumbar laminectomy 11/28/2018    Vitamin D deficiency     Personal history of colonic polyps     Intermittent atrial fibrillation ([facility] [address] 01/16/2017     Hx paroxysmal afib/lone afib (structurally normal heart), onset 2011.  Initial episode with IV cardizem, to NSR and on metoprolol XL 50.  Had w/u with stress MPI and Echo/dopper--normal.  Recurrent episode, and given po flecainide with restoration of NSR.  Previous Cardiologist opted for \"pill in pocket\" approach--takes Metoprolol XL 50mg every day, has metoprolol tartrate and flecainide to take if recurrent sustained episode. Had  episode(s) triggered by staying in cabin in [place] at [facility], no problems since as takes diamox prior.  Last stress test 2 yrs ago. Celena Christian ASA 81, CHADs2-VASC is 0.  No hx hypertension, DM, CHF, valvular, other.  Mild dyslipidemia, dietary rx. S/p lumbar sgy (foot drop), post-op developed osteomyelitis--ID, PICC, IV antibiotics, PT. Retired from [employer]. AFib ? flutter few weeks ago-took extra metoprolol, resolved. Plan:     Doing well with no adverse cardiac symptoms--infrequent PAF episodes as before  Echo/doppler--call w/ results   Lipids and labs followed by PCP. Continue current care and f/u in 12months.     Sonu Grimm MD

## 2021-06-02 ENCOUNTER — TELEPHONE (OUTPATIENT)
Dept: CARDIOLOGY CLINIC | Age: 61
End: 2021-06-02

## 2021-06-02 ENCOUNTER — HOSPITAL ENCOUNTER (OUTPATIENT)
Dept: ULTRASOUND IMAGING | Age: 61
Discharge: HOME | End: 2021-06-02
Attending: INTERNAL MEDICINE
Payer: COMMERCIAL

## 2021-06-02 DIAGNOSIS — I48.0 PAROXYSMAL ATRIAL FIBRILLATION (HCC): ICD-10-CM

## 2021-06-02 LAB
ECHO AO ROOT DIAM: 3.29 CM
ECHO AV PEAK GRADIENT: 6.58 MMHG
ECHO AV PEAK VELOCITY: 128.24 CM/S
ECHO EST RA PRESSURE: 10 MMHG
ECHO LA AREA 4C: 20.58 CM2
ECHO LA MAJOR AXIS: 3.53 CM
ECHO LA VOL 2C: 78.2 ML (ref 18–58)
ECHO LA VOL 4C: 52.6 ML (ref 18–58)
ECHO LA VOL BP: 70.99 ML (ref 18–58)
ECHO LV E' SEPTAL VELOCITY: 9.64 CM/S
ECHO LV INTERNAL DIMENSION DIASTOLIC: 6.29 CM (ref 4.2–5.9)
ECHO LV INTERNAL DIMENSION SYSTOLIC: 4.1 CM
ECHO LV IVSD: 0.88 CM (ref 0.6–1)
ECHO LV MASS 2D: 217.9 G (ref 88–224)
ECHO LV POSTERIOR WALL DIASTOLIC: 0.82 CM (ref 0.6–1)
ECHO LVOT DIAM: 2.11 CM
ECHO MV A VELOCITY: 66.9 CM/S
ECHO MV E DECELERATION TIME (DT): 297.03 MS
ECHO MV E VELOCITY: 52.88 CM/S
ECHO MV E/A RATIO: 0.79
ECHO MV E/E' SEPTAL: 5.49
ECHO RIGHT VENTRICULAR SYSTOLIC PRESSURE (RVSP): 26.77 MMHG
ECHO TV REGURGITANT MAX VELOCITY: 204.77 CM/S
ECHO TV REGURGITANT PEAK GRADIENT: 16.77 MMHG

## 2021-06-02 PROCEDURE — 93306 TTE W/DOPPLER COMPLETE: CPT | Performed by: INTERNAL MEDICINE

## 2021-06-02 PROCEDURE — 93306 TTE W/DOPPLER COMPLETE: CPT

## 2021-06-02 NOTE — TELEPHONE ENCOUNTER
Spoke w/ patient; two patient identifiers confirmed. Informed per Dr. Ashlyn Koenig Regarding: echo  Advise echo looks good--normal LV pumping function, valves ok, mildly dilated left atrium (goes along with his afib), no concerns. Patient verbalized understanding.     Gricelda Vazquez LPN

## 2021-06-02 NOTE — TELEPHONE ENCOUNTER
----- Message from Maciej Ward MD sent at 6/2/2021  4:25 PM EDT -----  Regarding: echo  Advise echo looks good--normal LV pumping function, valves ok, mildly dilated left atrium (goes along with his afib), no concerns.   Thanks [facility]

## 2021-06-22 ENCOUNTER — OFFICE VISIT (OUTPATIENT)
Dept: FAMILY MEDICINE CLINIC | Age: 61
End: 2021-06-22
Payer: COMMERCIAL

## 2021-06-22 VITALS
BODY MASS INDEX: 27.22 KG/M2 | OXYGEN SATURATION: 98 % | WEIGHT: 201 LBS | TEMPERATURE: 97 F | DIASTOLIC BLOOD PRESSURE: 66 MMHG | RESPIRATION RATE: 18 BRPM | HEIGHT: 72 IN | HEART RATE: 70 BPM | SYSTOLIC BLOOD PRESSURE: 110 MMHG

## 2021-06-22 DIAGNOSIS — Z00.8 ENCOUNTER FOR BIOMETRIC SCREENING: Primary | ICD-10-CM

## 2021-06-22 DIAGNOSIS — E78.2 MIXED HYPERLIPIDEMIA: ICD-10-CM

## 2021-06-22 PROCEDURE — 99396 PREV VISIT EST AGE 40-64: CPT | Performed by: PHYSICIAN ASSISTANT

## 2021-06-22 PROCEDURE — 36415 COLL VENOUS BLD VENIPUNCTURE: CPT | Performed by: PHYSICIAN ASSISTANT

## 2021-06-22 NOTE — PROGRESS NOTES
Charles Costa is a 59 y.o. male who presents to the office today with the following:  Chief Complaint   Patient presents with    Employment Physical     Be Well assesment       HPI  Here for be well assessment for insurance. Is fasting today. Reports feeling well with no complaints. Has his annual f/u with other labs in the Fall with PCP Dr. Gita Gil. ROS  See HPI. Past Medical History:   Diagnosis Date    Asthma, mild intermittent     Family history of prostate cancer     Grandfather    Foot drop, left     Intermittent atrial fibrillation ([address] 1/16/2017    Left hydrocele     Lumbar herniated disc     Personal history of colonic polyps     Negative colonoscopy 2015    Vitamin D deficiency        Past Surgical History:   Procedure Laterality Date    COLONOSCOPY N/A 6/15/2018    COLONOSCOPY performed by Winston Andino MD at [facility] ENDOSCOPY    COLORECTAL SCRN; HI RISK IND  6/15/2018         HX COLONOSCOPY  06/11/2015    HX ORTHOPAEDIC      left knee surgery meniscectomy repair    HX UROLOGICAL  2018    hydrocele repair    IR ASP DISK PERC  1/11/2019       No Known Allergies    Current Outpatient Medications   Medication Sig    metoprolol succinate (TOPROL-XL) 100 mg tablet 1/2 to 1 tab daily as directed    meloxicam (MOBIC) 15 mg tablet Take 1 Tab by mouth daily.  cholecalciferol (VITAMIN D3) 1,000 unit tablet Take 2 Tabs by mouth daily.  albuterol (PROVENTIL HFA, VENTOLIN HFA, PROAIR HFA) 90 mcg/actuation inhaler Take 1 Puff by inhalation every six (6) hours as needed for Wheezing. Indications: BRONCHOSPASM PREVENTION    tadalafil (CIALIS) 20 mg tablet Take 1 Tab by mouth as needed (ED). (Patient not taking: Reported on 6/22/2021)    metoprolol tartrate (LOPRESSOR) 50 mg tablet Take 1 Tab by mouth as needed (afib episode).  Indications: as needed for atrial fibrillation (Patient not taking: Reported on 6/22/2021)     No current facility-administered medications for this visit. Social History     Socioeconomic History    Marital status:      Spouse name: Not on file    Number of children: Not on file    Years of education: Not on file    Highest education level: Not on file   Tobacco Use    Smoking status: Never Smoker    Smokeless tobacco: Never Used   Substance and Sexual Activity    Alcohol use: Yes     Alcohol/week: 2.0 standard drinks     Types: 2 Glasses of wine per week     Comment: 2 glasses aaron daily    Drug use: No    Sexual activity: Not Currently     Social Determinants of Health     Financial Resource Strain:     Difficulty of Paying Living Expenses:    Food Insecurity:     Worried About Running Out of Food in the Last Year:     Ran Out of Food in the Last Year:    Transportation Needs:     Lack of Transportation (Medical):  Lack of Transportation (Non-Medical):    Physical Activity:     Days of Exercise per Week:     Minutes of Exercise per Session:    Stress:     Feeling of Stress :    Social Connections:     Frequency of Communication with Friends and Family:     Frequency of Social Gatherings with Friends and Family:     Attends Gnosticist Services:     Active Member of Clubs or Organizations:     Attends Club or Organization Meetings:     Marital Status:        Family History   Problem Relation Age of Onset    Cancer Mother         breast    Cancer Father         colon         Physical Exam:  Visit Vitals  /66 (BP 1 Location: Left upper arm, BP Patient Position: At rest, BP Cuff Size: Adult)   Pulse 70   Temp 97 °F (36.1 °C)   Resp 18   Ht 6' (1.829 m)   Wt 201 lb (91.2 kg)   SpO2 98%   BMI 27.26 kg/m²     Physical Exam  Vitals and nursing note reviewed. HENT:      Head: Normocephalic and atraumatic. Eyes:      Conjunctiva/sclera: Conjunctivae normal.   Cardiovascular:      Rate and Rhythm: Normal rate and regular rhythm. Heart sounds: Normal heart sounds.    Pulmonary:      Effort: Pulmonary effort is normal.      Breath sounds: Normal breath sounds. Abdominal:      Comments: Waist circumference = 38\"   Skin:     General: Skin is warm and dry. Neurological:      Mental Status: He is alert and oriented to person, place, and time. Gait: Gait is intact. Psychiatric:         Mood and Affect: Mood and affect normal.         Assessment/Plan:    ICD-10-CM ICD-9-CM    1. Well adult health check  Z00.00 V70.0 LIPID PANEL      GLUCOSE, RANDOM      GLUCOSE, RANDOM      LIPID PANEL   2.  Mixed hyperlipidemia  E78.2 272.2 LIPID PANEL      LIPID PANEL      ME HANDLG&/OR CONVEY OF SPEC FOR TR OFFICE TO LAB      COLLECTION VENOUS Huang Osman PA-C

## 2021-06-23 LAB
CHOLEST SERPL-MCNC: 250 MG/DL
GLUCOSE SERPL-MCNC: 95 MG/DL (ref 65–100)
HDLC SERPL-MCNC: 74 MG/DL
HDLC SERPL: 3.4 {RATIO} (ref 0–5)
LDLC SERPL CALC-MCNC: 156.4 MG/DL (ref 0–100)
TRIGL SERPL-MCNC: 98 MG/DL (ref ?–150)
VLDLC SERPL CALC-MCNC: 19.6 MG/DL

## 2021-08-26 DIAGNOSIS — I48.0 INTERMITTENT ATRIAL FIBRILLATION (HCC): ICD-10-CM

## 2021-08-26 RX ORDER — METOPROLOL SUCCINATE 100 MG/1
TABLET, EXTENDED RELEASE ORAL
Qty: 45 TABLET | Refills: 1 | Status: SHIPPED | OUTPATIENT
Start: 2021-08-26 | End: 2021-11-11 | Stop reason: SDUPTHER

## 2021-11-09 NOTE — PROGRESS NOTES
Mr. Presley Giron is a 59 y.o. male who is here for evaluation of   Chief Complaint   Patient presents with    Back Pain     x1 month. (B) pain  with radiation (B). L>R. Pain present in hip area. C/O \"doing something stupid\"    Knee Pain     (L) knee pain x 1 month - no swelling currently   . ASSESSMENT AND PLAN: There are 2 primary concerns today. The back seems to be stable and the left foot numbness is likely due to a sensory nerve root compressed due to recent activity while working in [employer]. The left knee is also activity related and appears stable at this time. We have advised that he slowly escalate his cycling to 20 miles at a time and assess for swelling. If he can get 20 miles in without any swelling I think he will be able to move forward to higher mileage rides without difficulty. In the event that he does develop swelling though he will probably need arthroscopy and we should try to make this determination within the next 3 or 4 months in order to make the August milestone. 1. Annual physical exam  2. Mixed hyperlipidemia  Based on his numbers from his wellness check in June, his 10-year risk for cardiovascular event is about 9%. Optimized, it would be about 7.5%. 3. Nocturia  We will arrange for testing  4. Intermittent atrial fibrillation (HCC)  Presently not an issue. Continue metoprolol. CHADS2 score is 0.  5. Need for hepatitis C screening test  We will arrange for testing today.       Orders Placed This Encounter    HEPATITIS C AB - Future Default     Standing Status:   Future     Standing Expiration Date:   12/10/2021    CBC WITH AUTOMATED DIFF     Standing Status:   Future     Standing Expiration Date:   12/10/2021    LIPID PANEL     Standing Status:   Future     Standing Expiration Date:   18/53/8218    METABOLIC PANEL, COMPREHENSIVE     Standing Status:   Future     Standing Expiration Date:   12/10/2021    PROSTATE SPECIFIC AG     Standing Status:   Future     Standing Expiration Date:   12/10/2021    TSH 3RD GENERATION     Standing Status:   Future     Standing Expiration Date:   12/10/2021           HPI 75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 He and his wife are planning a ride [place] [facility] starting in August.  They will start in [facility] and end up in [address] [place] [address]. Physically he is concerned about his back, left foot numbness and left knee discomfort. He has seen Dr. Cristhian Covarrubias previously but has not had arthroscopy. ROS:  Denies  fever, chills, cough, chest pain, SOB,  nausea, vomiting, or diarrhea. Denies wt loss, wt gain, hemoptysis, hematochezia or melena. Physical Examination:    Visit Vitals  /70 (BP 1 Location: Left upper arm, BP Patient Position: Sitting, BP Cuff Size: Adult long)   Pulse 60   Temp 98.2 °F (36.8 °C) (Temporal)   Resp 18   Ht 6' (1.829 m)   Wt 197 lb 3.2 oz (89.4 kg)   SpO2 98%   BMI 26.75 kg/m²      General:  Alert, cooperative, no distress. Head:  Normocephalic, without obvious abnormality, atraumatic. Eyes:  Conjunctivae/corneas clear. Pupils equal, round, reactive to light. Extraocular movements intact. Lungs:   Clear to auscultation bilaterally. Chest wall:  No tenderness or deformity. Cardiac:  RRR   Abdomen:   Soft, non-tender. Bowel sounds normal. No masses. No organomegaly. Extremities: Extremities normal, atraumatic, no cyanosis or edema. Pulses: 2+ and symmetric all extremities. Skin: Skin color, texture, turgor normal. No rashes or lesions. Lymph nodes: Cervical, supraclavicular, and axillary nodes normal.   Neurologic: CNII-XII intact. Normal strength, sensation, and reflexes throughout.      On this date 11/11/2021 I have spent 30 minutes reviewing previous notes, test results and face to face with the patient discussing the diagnosis and importance of compliance with the treatment plan as well as documenting on the day of the visit.     Olena Garcia MD FACP    (signed electronically) on 11/11/2021 at 8:36 AM

## 2021-11-11 ENCOUNTER — OFFICE VISIT (OUTPATIENT)
Dept: FAMILY MEDICINE CLINIC | Age: 61
End: 2021-11-11
Payer: COMMERCIAL

## 2021-11-11 VITALS
TEMPERATURE: 98.2 F | BODY MASS INDEX: 26.71 KG/M2 | SYSTOLIC BLOOD PRESSURE: 108 MMHG | WEIGHT: 197.2 LBS | HEIGHT: 72 IN | RESPIRATION RATE: 18 BRPM | DIASTOLIC BLOOD PRESSURE: 70 MMHG | OXYGEN SATURATION: 98 % | HEART RATE: 60 BPM

## 2021-11-11 DIAGNOSIS — E78.2 MIXED HYPERLIPIDEMIA: ICD-10-CM

## 2021-11-11 DIAGNOSIS — R35.1 NOCTURIA: ICD-10-CM

## 2021-11-11 DIAGNOSIS — M54.32 BILATERAL SCIATICA: ICD-10-CM

## 2021-11-11 DIAGNOSIS — M25.561 CHRONIC PAIN OF BOTH KNEES: ICD-10-CM

## 2021-11-11 DIAGNOSIS — M25.562 CHRONIC PAIN OF BOTH KNEES: ICD-10-CM

## 2021-11-11 DIAGNOSIS — G89.29 CHRONIC PAIN OF BOTH KNEES: ICD-10-CM

## 2021-11-11 DIAGNOSIS — I48.0 INTERMITTENT ATRIAL FIBRILLATION (HCC): ICD-10-CM

## 2021-11-11 DIAGNOSIS — M54.31 BILATERAL SCIATICA: ICD-10-CM

## 2021-11-11 DIAGNOSIS — Z00.00 ANNUAL PHYSICAL EXAM: Primary | ICD-10-CM

## 2021-11-11 DIAGNOSIS — Z11.59 NEED FOR HEPATITIS C SCREENING TEST: ICD-10-CM

## 2021-11-11 PROCEDURE — 99396 PREV VISIT EST AGE 40-64: CPT | Performed by: INTERNAL MEDICINE

## 2021-11-11 NOTE — PROGRESS NOTES
Kia Haynes is a 59 y.o. male presenting for/with:    Chief Complaint   Patient presents with    Back Pain     x1 month. (B) pain  with radiation (B). L>R. Pain present in hip area. C/O \"doing something stupid\"    Knee Pain     (L) knee pain x 1 month - no swelling currently       Visit Vitals  /70 (BP 1 Location: Left upper arm, BP Patient Position: Sitting, BP Cuff Size: Adult long)   Pulse 60   Temp 98.2 °F (36.8 °C) (Temporal)   Resp 18   Ht 6' (1.829 m)   Wt 197 lb 3.2 oz (89.4 kg)   SpO2 98%   BMI 26.75 kg/m²     Pain Scale: 1/10  Pain Location: Hip    1. Have you been to the ER, urgent care clinic since your last visit? Hospitalized since your last visit? NO    2. Have you seen or consulted any other health care providers outside of the [address] [place] [address] since your last visit? Include any pap smears or colon screening.  NO    Symptom review:  NO  Fever   NO  Shaking chills  NO  Cough  NO  Body aches  NO  Coughing up blood  NO  Chest congestion  NO  Chest pain  NO  Shortness of breath  NO  Profound Loss of smell/taste  NO  Nausea/Vomiting   NO  Loose stool/Diarrhea  NO  any skin issues    Patient Risk Factors Reviewed as follows:  NO  have you been in Close contact with confirmed COVID19 patient   NO  History of recent travel to affected geographical areas within the past 14 days  NO  COPD  NO  Active Cancer/Leukemia/Lymphoma/Chemotherapy  NO  Oral steroid use  NO  Pregnant  NO  Diabetes Mellitus  YES  Heart disease  NO  Asthma  NO Health care worker at home  [address] care worker  NO Is there a Pregnant Woman in the home  NO Dialysis pt in the home   NO a large number of people living in the home    Learning Assessment 11/11/2021   PRIMARY LEARNER Patient   HIGHEST LEVEL OF EDUCATION - PRIMARY LEARNER  -   BARRIERS PRIMARY LEARNER -   [place]   ANSWERED BY patient   RELATIONSHIP SELF Fall Risk Assessment, last 12 mths 11/11/2021   Able to walk? Yes   Fall in past 12 months? 0   Do you feel unsteady? 0   Are you worried about falling 0       3 most recent PHQ Screens 11/11/2021   Little interest or pleasure in doing things Not at all   Feeling down, depressed, irritable, or hopeless Not at all   Total Score PHQ 2 0     Abuse Screening Questionnaire 11/11/2021   Do you ever feel afraid of your partner? N   Are you in a relationship with someone who physically or mentally threatens you? N   Is it safe for you to go home?  Y       ADL Assessment 11/11/2021   Feeding yourself No Help Needed   Getting from bed to chair No Help Needed   Getting dressed No Help Needed   Bathing or showering No Help Needed   Walk across the room (includes cane/walker) No Help Needed   Using the telphone No Help Needed   Taking your medications No Help Needed   Preparing meals No Help Needed   Managing money (expenses/bills) No Help Needed   Moderately strenuous housework (laundry) No Help Needed   Shopping for personal items (toiletries/medicines) No Help Needed   Shopping for groceries No Help Needed   Driving No Help Needed   Climbing a flight of stairs No Help Needed   Getting to places beyond walking distances No Help Needed      Advance Care Planning 11/11/2021   Patient's Healthcare Decision Maker is: Legal Next of enoch 296 Directive Yes, not on file   Patient Would Like to Complete Advance Directive Yes

## 2021-11-12 LAB
ALBUMIN SERPL-MCNC: 4.2 G/DL (ref 3.5–5)
ALBUMIN/GLOB SERPL: 1.5 {RATIO} (ref 1.1–2.2)
ALP SERPL-CCNC: 53 U/L (ref 45–117)
ALT SERPL-CCNC: 27 U/L (ref 12–78)
ANION GAP SERPL CALC-SCNC: 4 MMOL/L (ref 5–15)
AST SERPL-CCNC: 20 U/L (ref 15–37)
BASOPHILS # BLD: 0.1 K/UL (ref 0–0.1)
BASOPHILS NFR BLD: 1 % (ref 0–1)
BILIRUB SERPL-MCNC: 0.7 MG/DL (ref 0.2–1)
BUN SERPL-MCNC: 22 MG/DL (ref 6–20)
BUN/CREAT SERPL: 25 (ref 12–20)
CALCIUM SERPL-MCNC: 8.9 MG/DL (ref 8.5–10.1)
CHLORIDE SERPL-SCNC: 105 MMOL/L (ref 97–108)
CHOLEST SERPL-MCNC: 233 MG/DL
CO2 SERPL-SCNC: 28 MMOL/L (ref 21–32)
COMMENT, HOLDF: NORMAL
CREAT SERPL-MCNC: 0.87 MG/DL (ref 0.7–1.3)
DIFFERENTIAL METHOD BLD: ABNORMAL
EOSINOPHIL # BLD: 0.1 K/UL (ref 0–0.4)
EOSINOPHIL NFR BLD: 1 % (ref 0–7)
ERYTHROCYTE [DISTWIDTH] IN BLOOD BY AUTOMATED COUNT: 11.5 % (ref 11.5–14.5)
GLOBULIN SER CALC-MCNC: 2.8 G/DL (ref 2–4)
GLUCOSE SERPL-MCNC: 94 MG/DL (ref 65–100)
HCT VFR BLD AUTO: 42 % (ref 36.6–50.3)
HCV AB SERPL QL IA: NONREACTIVE
HDLC SERPL-MCNC: 60 MG/DL
HDLC SERPL: 3.9 {RATIO} (ref 0–5)
HGB BLD-MCNC: 15.1 G/DL (ref 12.1–17)
IMM GRANULOCYTES # BLD AUTO: 0 K/UL (ref 0–0.04)
IMM GRANULOCYTES NFR BLD AUTO: 0 % (ref 0–0.5)
LDLC SERPL CALC-MCNC: 146.2 MG/DL (ref 0–100)
LYMPHOCYTES # BLD: 1.3 K/UL (ref 0.8–3.5)
LYMPHOCYTES NFR BLD: 26 % (ref 12–49)
MCH RBC QN AUTO: 34.3 PG (ref 26–34)
MCHC RBC AUTO-ENTMCNC: 36 G/DL (ref 30–36.5)
MCV RBC AUTO: 95.5 FL (ref 80–99)
MONOCYTES # BLD: 0.5 K/UL (ref 0–1)
MONOCYTES NFR BLD: 10 % (ref 5–13)
NEUTS SEG # BLD: 3 K/UL (ref 1.8–8)
NEUTS SEG NFR BLD: 62 % (ref 32–75)
NRBC # BLD: 0 K/UL (ref 0–0.01)
NRBC BLD-RTO: 0 PER 100 WBC
PLATELET # BLD AUTO: 151 K/UL (ref 150–400)
PMV BLD AUTO: 10.8 FL (ref 8.9–12.9)
POTASSIUM SERPL-SCNC: 4.1 MMOL/L (ref 3.5–5.1)
PROT SERPL-MCNC: 7 G/DL (ref 6.4–8.2)
PSA SERPL-MCNC: 3.4 NG/ML (ref 0.01–4)
RBC # BLD AUTO: 4.4 M/UL (ref 4.1–5.7)
SAMPLES BEING HELD,HOLD: NORMAL
SODIUM SERPL-SCNC: 137 MMOL/L (ref 136–145)
TRIGL SERPL-MCNC: 134 MG/DL (ref ?–150)
TSH SERPL DL<=0.05 MIU/L-ACNC: 3.32 UIU/ML (ref 0.36–3.74)
VLDLC SERPL CALC-MCNC: 26.8 MG/DL
WBC # BLD AUTO: 4.8 K/UL (ref 4.1–11.1)

## 2021-12-02 ENCOUNTER — PATIENT MESSAGE (OUTPATIENT)
Dept: FAMILY MEDICINE CLINIC | Age: 61
End: 2021-12-02

## 2021-12-07 RX ORDER — MELOXICAM 15 MG/1
TABLET ORAL
Qty: 90 TABLET | Refills: 4 | Status: SHIPPED | OUTPATIENT
Start: 2021-12-07 | End: 2022-06-13 | Stop reason: SDUPTHER

## 2021-12-14 ENCOUNTER — TRANSCRIBE ORDER (OUTPATIENT)
Dept: SCHEDULING | Age: 61
End: 2021-12-14

## 2021-12-14 DIAGNOSIS — M54.10 BACK PAIN WITH LEFT-SIDED RADICULOPATHY: Primary | ICD-10-CM

## 2021-12-16 ENCOUNTER — HOSPITAL ENCOUNTER (OUTPATIENT)
Dept: PHYSICAL THERAPY | Age: 61
Discharge: HOME | End: 2021-12-16
Payer: COMMERCIAL

## 2021-12-16 PROCEDURE — 97161 PT EVAL LOW COMPLEX 20 MIN: CPT

## 2021-12-16 PROCEDURE — 97110 THERAPEUTIC EXERCISES: CPT

## 2021-12-16 NOTE — THERAPY EVALUATION
PT INITIAL EVALUATION NOTE - [facility] 2-15    Patient Name: Wicho Molina  Date:2021  : 1960  [x]  Patient  Verified  Payor: MEDICAL Delarosa Richy / Plan: [facility] MEDICAL [employer] [address] / Product Type: Commerical /    In time: 507  Out time: 1000  Total Treatment Time (min): 45  Total Timed Codes (min): 45  1:1 Treatment Time (MC only):  45  Visit #: 1     Treatment Area: Knee pain, bilateral [M25.561, M25.562]  Sciatic pain, right [M54.31]  Left sciatic nerve pain [M54.32]    SUBJECTIVE  Pain Level (0-10 scale): 210  Any medication changes, allergies to medications, adverse drug reactions, diagnosis change, or new procedure performed?: [] No    [x] Yes (see summary sheet for update)  Subjective:    \"I feel like I have intermittent numbness around the ankle and some L sided knee pain that comes on after I cycle for longer than 10 miles at a time. \"  Mechanism of Injury:overuse  PMHx/Surgical Hx: see medical history  Work Hx: retired physician  Living Situation: alone at home with wife  Pt Goals: 'I want be able to bike longer distances without my knee pain. \"        OBJECTIVE/EXAMINATION*  WFL ROM:          MMT: WFL except L side DF: 4/5    Special Tests:    + SLR on L with variations on common peroneal n.  As well    23 min Therapeutic Exercise:  [x] See flow sheet :           With   [x] TE   [] TA   [] neuro   [] other: Patient Education: [x] Review HEP    [x] Progressed/Changed HEP based on:   [] positioning   [] body mechanics   [] transfers   [] heat/ice application    [] other:      Other Objective/Functional Measures: FOTO    Pain Level (0-10 scale) post treatment: 2/10    ASSESSMENT/Changes in Function: See POC    [x]  See Plan of Lazaro 68, PT, DPT, EP-C 2021

## 2021-12-16 NOTE — PROGRESS NOTES
[facility]  [address] [facility], [facility]:  [address]: [phone]      Plan of Care/ Statement of Necessity for Physical Therapy Services    Patient name: Carlitos Escamilla Start of Care: 2021   Referral source: Selina Salazar MD : 1960    Medical Diagnosis: Knee pain, bilateral [M25.561, M25.562]  Sciatic pain, right [M54.31]  Left sciatic nerve pain [M54.32]   Onset Date: 21    Treatment Diagnosis: L sided weakness/sciatic pain   Prior Hospitalization: see medical history Provider#: [id number]   Medications: Verified on Patient summary List    Comorbidities: see medical history   Prior Level of Function: independent with all functional activities     The Plan of Care and following information is based on the information from the initial evaluation. Assessment/ key information: Pt. Presents to therapy with reported L sided numbness/tingling related to lower lumbar/sciatic symptoms as well as residual weakness of the LLE compared to the right. Pt. Demonstrates decreased frontal plan hip strength in abd/adductors as well as decreased dynamic LE knee eccentric strength with valgus moments throughout assessment and exercises in initial encounter.  Future session will focus on increasing LE flexibility and dynamic strengthening of bilateral lower extremities with an endurance-based treatment program.    Evaluation Complexity History LOW Complexity : Zero comorbidities / personal factors that will impact the outcome / POC; Examination LOW Complexity : 1-2 Standardized tests and measures addressing body structure, function, activity limitation and / or participation in recreation  ;Presentation LOW Complexity : Stable, uncomplicated  ;Clinical Decision Making LOW Complexity : FOTO score of   Overall Complexity Rating: LOW   Problem List: pain affecting function, decrease strength and decrease activity tolerance   Treatment Plan may include any combination of the following: Therapeutic exercise, Therapeutic activities, Neuromuscular re-education, Physical agent/modality, Gait/balance training and Stair training  Patient / Family readiness to learn indicated by: asking questions, trying to perform skills and interest  Persons(s) to be included in education: patient (P)  Barriers to Learning/Limitations: None  Patient Goal (s): I want to be able to bike longer than 10 miles with decreased pain  Patient Self Reported Health Status: good  Rehabilitation Potential: good    Short Term Goals: To be accomplished in 8 treatments:  Pt. Will adhere and be independent with given HEP  Pt. Will demonstrate decreased valgus moments at knee during eccentric closed chain exercises on 80% of instances  Long Term Goals: To be accomplished in 16 treatments:  Pt. Will report a decrease in pain after biking 10 miles to <2/10 in LLE  Pt. Will be WFL on BLE MMT  Pt. Will score >72 on FOTO outcome measure    Frequency / Duration: Patient to be seen 1-2 times per week for 16 treatments. Patient/ Caregiver education and instruction: Diagnosis, prognosis, self care, activity modification and exercises   []  Plan of care has been reviewed with SHMUEL Crandall, PT, DPT, EP-C  12/16/2021     ________________________________________________________________________    I certify that the above Therapy Services are being furnished while the patient is under my care. I agree with the treatment plan and certify that this therapy is necessary. [de-identified] Signature:____________________  Date:____________Time: _________           Roxana Lawler MD    Please sign and return to:  [facility]  [address] [place], [place], [address] [facility]:  [address] [place] [address] Street: [phone]

## 2021-12-17 ENCOUNTER — HOSPITAL ENCOUNTER (OUTPATIENT)
Dept: MRI IMAGING | Age: 61
Discharge: HOME | End: 2021-12-17
Attending: NEUROLOGICAL SURGERY
Payer: COMMERCIAL

## 2021-12-17 DIAGNOSIS — M54.10 BACK PAIN WITH LEFT-SIDED RADICULOPATHY: ICD-10-CM

## 2021-12-17 PROCEDURE — 74011250636 HC RX REV CODE- 250/636: Performed by: NEUROLOGICAL SURGERY

## 2021-12-17 PROCEDURE — 72158 MRI LUMBAR SPINE W/O & W/DYE: CPT

## 2021-12-17 PROCEDURE — A9576 INJ PROHANCE MULTIPACK: HCPCS | Performed by: NEUROLOGICAL SURGERY

## 2021-12-17 RX ADMIN — GADOTERIDOL 20 ML: 279.3 INJECTION, SOLUTION INTRAVENOUS at 11:48

## 2021-12-21 ENCOUNTER — HOSPITAL ENCOUNTER (OUTPATIENT)
Dept: PHYSICAL THERAPY | Age: 61
Discharge: HOME | End: 2021-12-21
Payer: COMMERCIAL

## 2021-12-21 PROCEDURE — 97110 THERAPEUTIC EXERCISES: CPT

## 2021-12-21 PROCEDURE — 97014 ELECTRIC STIMULATION THERAPY: CPT

## 2021-12-21 NOTE — PROGRESS NOTES
PT DAILY TREATMENT NOTE     Patient Name: Cary Gomez  Date:2021  : 1960  [x]  Patient  Verified  Payor: [employer] [place] / Plan: [facility] MEDICAL [place] [address] / Product Type: Commerical /    In time: 1000 Out time: 1045  Total Treatment Time (min): 45  Visit #: 2    Treatment Area: Knee pain, bilateral [M25.561, M25.562]  Sciatic pain, right [M54.31]  Left sciatic nerve pain [M54.32]     SUBJECTIVE  Pain Level (0-10 scale): 0/10  Any medication changes, allergies to medications, adverse drug reactions, diagnosis change, or new procedure performed?: [x] No    [] Yes (see summary sheet for update)  Subjective functional status/changes:   [] No changes reported  \"My sciatic pain has been more evident in the past couple of days. \"    OBJECTIVE    15 min Heat with IFC, E-stim setting on lower lumbar spine, skin checks done before and after (redness with no adverse affects)    30 min Therapeutic Exercise:  [x] See flow sheet :   Rationale: increase strength, improve coordination and improve balance to improve the patients ability to perform in the community and home more effectively          With   [x] TE   [] TA   [] neuro   [] other: Patient Education: [x] Review HEP    [x] Progressed/Changed HEP based on:   [] positioning   [] body mechanics   [] transfers   [] heat/ice application    [] other:      Other Objective/Functional Measures: FOTO     Pain Level (0-10 scale) post treatment: 0/10    ASSESSMENT/Changes in Function: Pt. Responded well to treatment session today focusing on dynamic balance activities as well as closed chain eccentric strengthening. Pt. Struggled with dynamic balance activities and these will be address in future sessions. Patient will continue to benefit from skilled PT services to modify and progress therapeutic interventions, address strength deficits and analyze and cue movement patterns to attain remaining goals.      [x]  See Plan of Care  []  See progress note/recertification  []  See Discharge Summary         Progress towards goals / Updated goals:  Progressing    PLAN  [x]  Upgrade activities as tolerated     [x]  Continue plan of care  []  Update interventions per flow sheet       []  Discharge due to:_  []  Other:_      Alfonso uHmphrey, PT, DPT, EP-C 12/21/2021

## 2021-12-28 ENCOUNTER — APPOINTMENT (OUTPATIENT)
Dept: PHYSICAL THERAPY | Age: 61
End: 2021-12-28
Payer: COMMERCIAL

## 2021-12-30 ENCOUNTER — HOSPITAL ENCOUNTER (OUTPATIENT)
Dept: PHYSICAL THERAPY | Age: 61
Discharge: HOME | End: 2021-12-30
Payer: COMMERCIAL

## 2021-12-30 PROCEDURE — 97110 THERAPEUTIC EXERCISES: CPT

## 2021-12-30 PROCEDURE — 97014 ELECTRIC STIMULATION THERAPY: CPT

## 2021-12-30 NOTE — PROGRESS NOTES
PT DAILY TREATMENT NOTE     Patient Name: Fabian Maria  Date:2021  : 1960  [x]  Patient  Verified  Payor: MEDICAL [facility] / Plan: [facility] MEDICAL [place] [address] / Product Type: Commerical /    In time:10  Out time:1045  Total Treatment Time (min): 45  Visit #: 3 of 16    Treatment Area: Knee pain, bilateral [M25.561, M25.562]  Sciatic pain, right [M54.31]  Left sciatic nerve pain [M54.32]    SUBJECTIVE  Pain Level (0-10 scale): 1-2/10  Any medication changes, allergies to medications, adverse drug reactions, diagnosis change, or new procedure performed?: [x] No    [] Yes (see summary sheet for update)  Subjective functional status/changes:   [] No changes reported  Numbness in L foot continues, back is not bad, sciatica and numbness.   Pt swam 30 min this am    OBJECTIVE    Modality rationale: decrease inflammation and decrease pain to improve the patients ability to perform functional activities   Min Type Additional Details   15 [x] Estim:  [x]Unatt       []IFC  []Premod                        []Other:  []w/ice   [x]w/heat  Position:prone 2 pillows under pelvis  Location:lumbar    [] Estim: []Att    []TENS instruct  []NMES                    []Other:  []w/US   []w/ice   []w/heat  Position:  Location:    []  Traction: [] Cervical       []Lumbar                       [] Prone          []Supine                       []Intermittent   []Continuous Lbs:  [] before manual  [] after manual    []  Ultrasound: []Continuous   [] Pulsed                           []1MHz   []3MHz W/cm2:  Location:    []  Iontophoresis with dexamethasone         Location: [] Take home patch   [] In clinic    []  Ice     []  heat  []  Ice massage  []  Laser   []  Anodyne Position:  Location:    []  Laser with stim  []  Other:  Position:  Location:    []  Vasopneumatic Device Pressure:       [] lo [] med [] hi   Temperature: [] lo [] med [] hi   [x] Skin assessment post-treatment:  [x]intact []redness- no adverse reaction    []redness - adverse reaction:       30 min Therapeutic Exercise:  [x] See flow sheet :   Rationale: increase ROM, increase strength, improve coordination, improve balance and increase proprioception to improve the patients ability to perform functional activities              With   [] TE   [] TA   [] neuro   [] other: Patient Education: [x] Review HEP    [] Progressed/Changed HEP based on:   [] positioning   [] body mechanics   [] transfers   [] heat/ice application    [] other:           Pain Level (0-10 scale) post treatment: 0    ASSESSMENT/Changes in Function: decreased pain and radicular symptoms after treatment    Patient will continue to benefit from skilled PT services to modify and progress therapeutic interventions and address functional mobility deficits to attain remaining goals. [x]  See Plan of Care  []  See progress note/recertification  []  See Discharge Summary         Progress towards goals / Updated goals:   Working towards goals    PLAN  [x]  Upgrade activities as tolerated     [x]  Continue plan of care  []  Update interventions per flow sheet       []  Discharge due to:_  []  Other:_      Antony Palmer, PT 12/30/2021

## 2022-01-04 ENCOUNTER — HOSPITAL ENCOUNTER (OUTPATIENT)
Dept: PHYSICAL THERAPY | Age: 62
Discharge: HOME | End: 2022-01-04
Payer: COMMERCIAL

## 2022-01-04 PROCEDURE — 97110 THERAPEUTIC EXERCISES: CPT

## 2022-01-04 PROCEDURE — 97014 ELECTRIC STIMULATION THERAPY: CPT

## 2022-01-04 NOTE — PROGRESS NOTES
PT DAILY TREATMENT NOTE     Patient Name: Kalli Anne  Date:2022  : 1960  [x]  Patient  Verified  Payor: MEDICAL Del[employer]osa Richy / Plan: [facility] MEDICAL [employer] [address] [place] [address] / Product Type: Commerical /    In time:250  Out time:350  Total Treatment Time (min): 60  Visit #: 4 of 16    Treatment Area: Knee pain, bilateral [M25.561, M25.562]  Sciatic pain, right [M54.31]  Left sciatic nerve pain [M54.32]    SUBJECTIVE  Pain Level (0-10 scale): L LE 0/10 currently, increases to 2/10 intermittently  Any medication changes, allergies to medications, adverse drug reactions, diagnosis change, or new procedure performed?: [x] No    [] Yes (see summary sheet for update)  Subjective functional status/changes:   [] No changes reported  Patient reports his radicular symptoms are less frequent during the day- patient reports that it helped to have 2 pillows under his stomach for the estim treatment    OBJECTIVE              Modality rationale: decrease inflammation and decrease pain to improve the patients ability to perform functional activities   Min Type Additional Details    15 [x]? Estim:  [x]? Unatt       []? IFC  []? Premod                        []?Other:  []?w/ice   [x]?w/heat  Position:prone 2 pillows under pelvis  Location:lumbar      []? Estim: []? Att    []? TENS instruct  []? NMES                    []?Other:  []?w/US   []?w/ice   []?w/heat  Position:  Location:      []? Traction: []? Cervical       []? Lumbar                       []? Prone          []? Supine                       []?Intermittent   []? Continuous Lbs:  []? before manual  []? after manual      []? Ultrasound: []? Continuous   []? Pulsed                           []? 1MHz   []? 3MHz W/cm2:  Location:      []? Iontophoresis with dexamethasone         Location: []? Take home patch   []? In clinic      []? Ice     []?  heat  []? Ice massage  []? Laser   []? Anodyne Position:  Location:      []? Laser with stim  []?   Other: Position:  Location:      []? Vasopneumatic Device Pressure:       []? lo []? med []? hi   Temperature: []? lo []? med []? hi    [x]? Skin assessment post-treatment:  [x]? intact []? redness- no adverse reaction    []? redness - adverse reaction:         40 min Therapeutic Exercise:  [x]? See flow sheet :   Rationale: increase ROM, increase strength, improve coordination, improve balance and increase proprioception to improve the patients ability to perform functional activities       With   [] TE   [] TA   [] neuro   [] other: Patient Education: [x] Review HEP    [] Progressed/Changed HEP based on:   [] positioning   [] body mechanics   [] transfers   [] heat/ice application    [] other:           Pain Level (0-10 scale) post treatment: 0    ASSESSMENT/Changes in Function: Patient demonstrates improved trunk and hip strength and control with high level activities, decreased radicular symptoms    Patient will continue to benefit from skilled PT services to modify and progress therapeutic interventions and address functional mobility deficits to attain remaining goals. [x]  See Plan of Care  []  See progress note/recertification  []  See Discharge Summary         Progress towards goals / Updated goals:   Working towards goals    PLAN  []  Upgrade activities as tolerated     [x]  Continue plan of care  []  Update interventions per flow sheet       []  Discharge due to:_  []  Other:_      Joann Vidales, PT 1/4/2022

## 2022-01-06 ENCOUNTER — HOSPITAL ENCOUNTER (OUTPATIENT)
Dept: PHYSICAL THERAPY | Age: 62
Discharge: HOME | End: 2022-01-06
Payer: COMMERCIAL

## 2022-01-06 PROCEDURE — 97014 ELECTRIC STIMULATION THERAPY: CPT

## 2022-01-06 PROCEDURE — 97110 THERAPEUTIC EXERCISES: CPT

## 2022-01-06 NOTE — PROGRESS NOTES
PT DAILY TREATMENT NOTE     Patient Name: Danyel Grace  Date:2022  : 1960  [x]  Patient  Verified  Payor: Alicia Florencia / Plan: [employer] [address] / Product Type: PPO /    In time:250  Out time:340  Total Treatment Time (min): 50  Visit #: 5 of 16    Treatment Area: Knee pain, bilateral [M25.561, M25.562]  Sciatic pain, right [M54.31]  Left sciatic nerve pain [M54.32]    SUBJECTIVE  Pain Level (0-10 scale): 1  Any medication changes, allergies to medications, adverse drug reactions, diagnosis change, or new procedure performed?: [x] No    [] Yes (see summary sheet for update)  Subjective functional status/changes:   [] No changes reported  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 ? Estim:  [x]? ? Unatt       []? ?IFC  []? ?Premod                        []? ? Other:  []??w/ice   [x]? ?w/heat  Position:prone 2 pillows under pelvis  Location:lumbar       []? ? Estim: []??Att    []? ?TENS instruct  []? ?NMES                    []? ? Other:  []??w/US   []? ?w/ice   []? ?w/heat  Position:  Location:       []? ?  Traction: []?? Cervical       []? ?Lumbar                       []? ? Prone          []? ?Supine                       []? ?Intermittent   []? ? Continuous Lbs:  []?? before manual  []? ? after manual       []? ?  Ultrasound: []??Continuous   []? ? Pulsed                           []? ?1MHz   []? ? 3MHz W/cm2:  Location:       []? ?  Iontophoresis with dexamethasone         Location: []?? Take home patch   []? ? In clinic       []? ?  Ice     []? ?  heat  []? ?  Ice massage  []? ?  Laser   []? ?  Anodyne Position:  Location:       []? ?  Laser with stim  []? ?  Other:  Position:  Location:       []? ?  Vasopneumatic Device Pressure:       []? ? lo []? ? med []?? hi   Temperature: []?? lo []? ? med []?? hi     [x]? ? Skin assessment post-treatment:  [x]? ? intact []? ?redness- no adverse reaction    []? ?redness - adverse reaction:         35 min Therapeutic Exercise:  [x]? ? See flow sheet :added scissoring and prone on exercise ball walking it out to HEP   Rationale: increase ROM, increase strength, improve coordination, improve balance and increase proprioception to improve the patients ability to perform functional activities         With   [] TE   [] TA   [] neuro   [] other: Patient Education: [x] Review HEP    [] Progressed/Changed HEP based on:   [] positioning   [] body mechanics   [] transfers   [] heat/ice application    [] other:           Pain Level (0-10 scale) post treatment: 0-1    ASSESSMENT/Changes in Function: Patient is working towards goals, improved ability to stand and perform activities with less radicular symptoms    Patient will continue to benefit from skilled PT services to modify and progress therapeutic interventions and address functional mobility deficits to attain remaining goals. [x]  See Plan of Care  []  See progress note/recertification  []  See Discharge Summary         Progress towards goals / Updated goals:   Working towards goals    PLAN  [x]  Upgrade activities as tolerated     [x]  Continue plan of care  [x]  Update interventions per flow sheet       []  Discharge due to:_  []  Other:_      Patricio Avery, PT 1/6/2022

## 2022-01-11 ENCOUNTER — HOSPITAL ENCOUNTER (OUTPATIENT)
Dept: PHYSICAL THERAPY | Age: 62
Discharge: HOME | End: 2022-01-11
Payer: COMMERCIAL

## 2022-01-11 PROCEDURE — 97140 MANUAL THERAPY 1/> REGIONS: CPT

## 2022-01-11 PROCEDURE — 97014 ELECTRIC STIMULATION THERAPY: CPT

## 2022-01-11 PROCEDURE — 97110 THERAPEUTIC EXERCISES: CPT

## 2022-01-11 NOTE — PROGRESS NOTES
PT DAILY TREATMENT NOTE     Patient Name: Genesis Walters  Date:2022  : 1960  [x]  Patient  Verified  Payor: Rafa Yin / Plan: [facility] LEERO[facility] [address] / Product Type: PPO /    In time:1005  Out time:11  Total Treatment Time (min): 55  Visit #: 6 of 16    Treatment Area: Knee pain, bilateral [M25.561, M25.562]  Sciatic pain, right [M54.31]  Left sciatic nerve pain [M54.32]    SUBJECTIVE  Pain Level (0-10 scale): 1-4/10  Any medication changes, allergies to medications, adverse drug reactions, diagnosis change, or new procedure performed?: [x] No    [] Yes (see summary sheet for update)  Subjective functional status/changes:   [] No changes reported  Patient reports he is having increased radicular symptoms past couple of days, will stop swimming and the scissoring ex, increased foot numbness on L    OBJECTIVE    Modality rationale: decrease edema, decrease inflammation and decrease pain to improve the patients ability to perform functional activity   Min Type Additional Details   15 [x] Estim:  [x]Unatt       [x]IFC  []Premod                        []Other:  []w/ice   [x]w/heat  Position: prone  Location:L lumbar    [] Estim: []Att    []TENS instruct  []NMES                    []Other:  []w/US   []w/ice   []w/heat  Position:  Location:    []  Traction: [] Cervical       []Lumbar                       [] Prone          []Supine                       []Intermittent   []Continuous Lbs:  [] before manual  [] after manual   5 [x]  Ultrasound: []Continuous   [x] Pulsed                           [x]1MHz   []3MHz W/cm2:1.2  Location:L lumbar, piriformis    []  Iontophoresis with dexamethasone         Location: [] Take home patch   [] In clinic    []  Ice     []  heat  []  Ice massage  []  Laser   []  Anodyne Position:  Location:    []  Laser with stim  []  Other:  Position:  Location:    []  Vasopneumatic Device Pressure:       [] lo [] med [] hi   Temperature: [] lo [] med [] hi   [] Skin assessment post-treatment:  []intact []redness- no adverse reaction    []redness - adverse reaction:       15 min Therapeutic Exercise:  [x] See flow sheet :modified HEP due to increase of radicular symptoms   Rationale: increase ROM and increase strength to improve the patients ability to perform functional activities    8 min Manual Therapy:  MFR piriformis   Rationale: decrease pain, increase ROM, increase tissue extensibility and decrease trigger points to improve functional mobility            With   [] TE   [] TA   [] neuro   [] other: Patient Education: [x] Review HEP    [] Progressed/Changed HEP based on:   [] positioning   [] body mechanics   [] transfers   [] heat/ice application    [] other:         Pain Level (0-10 scale) post treatment: 1    ASSESSMENT/Changes in Function: increased radicular symptoms, patient to focus on less aggressive core strengthening and flexibility exercises over the next week    Patient will continue to benefit from skilled PT services to modify and progress therapeutic interventions and address functional mobility deficits to attain remaining goals. [x]  See Plan of Care  [x]  See progress note/recertification  []  See Discharge Summary         Progress towards goals / Updated goals:patient is progressing towards goals    Short Term Goals: To be accomplished in 8 treatments:  Pt. Will adhere and be independent with given HEP  Pt. Will demonstrate decreased valgus moments at knee during eccentric closed chain exercises on 80% of instances  Long Term Goals: To be accomplished in 16 treatments:  Pt. Will report a decrease in pain after biking 10 miles to <2/10 in LLE  Pt. Will be WFL on BLE MMT  Pt.  Will score >72 on FOTO outcome measure    PLAN  [x]  Upgrade activities as tolerated     [x]  Continue plan of care  [x]  Update interventions per flow sheet       []  Discharge due to:_  []  Other:_      Luciana Aase, PT 1/11/2022

## 2022-01-11 NOTE — PROGRESS NOTES
[address], [address] [place] [address]  Phone: [phone]      Fax:  [phone]    Progress Note    Name: Markus Nobles   : 1960   MD: Addy Morales MD       Treatment Diagnosis: Knee pain, bilateral [M25.561, M25.562]  Sciatic pain, right [M54.31]  Left sciatic nerve pain [M54.32]  Start of Care: 21    Visits from Start of Care: 6      Summary of Care:Overall, patient has progressed well. He was experiencing decreased pain and radicular symptoms until 5 days ago. As of now, he will stop swimming and will focus on core strengthening and flexibility exercise. His pain is usually a 1/10, however, it can flare up to a 5/ with certain activities. He is working towards goals. Assessment / Recommendations: Continue skilled PT 1-2x/wk up to 16 visits to work towards goals. Short Term Goals: To be accomplished in 8 treatments:  Pt. Will adhere and be independent with given HEP  Pt. Will demonstrate decreased valgus moments at knee during eccentric closed chain exercises on 80% of instances  Long Term Goals: To be accomplished in 16 treatments:  Pt. Will report a decrease in pain after biking 10 miles to <2/10 in LLE  Pt. Will be WFL on BLE MMT  Pt. Will score >72 on FOTO outcome measure         Te Martinez, PT 2022 11:48 AM    ________________________________________________________________________  NOTE TO PHYSICIAN:  Please complete the following and fax to: [facility]: Fax: [phone]. Gita Baires Retain this original for your records. If you are unable to process this request in 24 hours, please contact our office.        ____ I have read the above report and request that my patient continue therapy with the following changes/special instructions:  ____ I have read the above report and request that my patient be discharged from therapy    Physician's Signature:_________________ Date:___________Time:__________

## 2022-01-13 ENCOUNTER — APPOINTMENT (OUTPATIENT)
Dept: PHYSICAL THERAPY | Age: 62
End: 2022-01-13
Payer: COMMERCIAL

## 2022-01-18 ENCOUNTER — HOSPITAL ENCOUNTER (OUTPATIENT)
Dept: PHYSICAL THERAPY | Age: 62
Discharge: HOME | End: 2022-01-18
Payer: COMMERCIAL

## 2022-01-18 PROCEDURE — 97014 ELECTRIC STIMULATION THERAPY: CPT

## 2022-01-18 PROCEDURE — 97110 THERAPEUTIC EXERCISES: CPT

## 2022-01-18 NOTE — PROGRESS NOTES
PT DAILY TREATMENT NOTE     Patient Name: Jb Wall  Date:2022  : 1960  [x]  Patient  Verified  Payor: BLUE [employer]OSS / Plan: [facility] [address] / Product Type: PPO /    In time: 1000  Out time: 6840  Total Treatment Time (min): 52  Visit #: 7    Treatment Area: Knee pain, bilateral [M25.561, M25.562]  Sciatic pain, right [M54.31]  Left sciatic nerve pain [M54.32]    SUBJECTIVE  Pain Level (0-10 scale): 3/10  Any medication changes, allergies to medications, adverse drug reactions, diagnosis change, or new procedure performed?: [x] No    [] Yes (see summary sheet for update)  Subjective functional status/changes:   [x] No changes reported    OBJECTIVE    Modality rationale: decrease edema and decrease inflammation to improve the patients ability to perform in the community and home   Min Type Additional Details   15 [x] Estim:  [x]Unatt       [x]IFC  []Premod                        []Other:  [x]w/ice   []w/heat  Position: prone  Location: lumbar spine    [] Estim: []Att    []TENS instruct  []NMES                    []Other:  []w/US   []w/ice   []w/heat  Position:  Location:    []  Traction: [] Cervical       []Lumbar                       [] Prone          []Supine                       []Intermittent   []Continuous Lbs:  [] before manual  [] after manual    []  Ultrasound: []Continuous   [] Pulsed                           []1MHz   []3MHz W/cm2:  Location:    []  Iontophoresis with dexamethasone         Location: [] Take home patch   [] In clinic    []  Ice     []  heat  []  Ice massage  []  Laser   []  Anodyne Position:  Location:    []  Laser with stim  []  Other:  Position:  Location:    []  Vasopneumatic Device Pressure:       [] lo [] med [] hi   Temperature: [] lo [] med [] hi   [x] Skin assessment post-treatment:  [x]intact [x]redness- no adverse reaction    []redness - adverse reaction:     32 min Therapeutic Exercise:  [x] See flow sheet :   Rationale: increase ROM, increase strength and improve balance to improve the patients ability to perform in the community and home more effectively          With   [x] TE   [] TA   [] neuro   [] other: Patient Education: [x] Review HEP    [] Progressed/Changed HEP based on:   [] positioning   [] body mechanics   [] transfers   [] heat/ice application    [] other:      Other Objective/Functional Measures: FOTO    Pain Level (0-10 scale) post treatment: 3/10    ASSESSMENT/Changes in Function: Pt. Reporting increased radicular symptoms and treatment focused on extension movements and functional flexibility of t-spine and lumbar spine  Patient will continue to benefit from skilled PT services to modify and progress therapeutic interventions, address ROM deficits, address strength deficits and analyze and cue movement patterns to attain remaining goals.      [x]  See Plan of Care  []  See progress note/recertification  []  See Discharge Summary         Progress towards goals / Updated goals:  Progressing    PLAN  []  Upgrade activities as tolerated     []  Continue plan of care  []  Update interventions per flow sheet       []  Discharge due to:_  []  Other:_      Madelin Davis, PT, DPT, EP-C 1/18/2022

## 2022-01-20 ENCOUNTER — HOSPITAL ENCOUNTER (OUTPATIENT)
Dept: PHYSICAL THERAPY | Age: 62
Discharge: HOME | End: 2022-01-20
Payer: COMMERCIAL

## 2022-01-20 PROCEDURE — 97140 MANUAL THERAPY 1/> REGIONS: CPT

## 2022-01-20 PROCEDURE — 97110 THERAPEUTIC EXERCISES: CPT

## 2022-01-20 NOTE — PROGRESS NOTES
PT DAILY TREATMENT NOTE     Patient Name: Guanaco Oas  Date:2022  : 1960  [x]  Patient  Verified  Payor: CHARLY YU / Plan: [facility] UMA[facility] [address] / Product Type: PPO /    In time:1000  Out time:1043  Total Treatment Time (min): 43  Visit #: 8    Treatment Area: Knee pain, bilateral [M25.561, M25.562]  Sciatic pain, right [M54.31]  Left sciatic nerve pain [M54.32]    SUBJECTIVE  Pain Level (0-10 scale): 310  Any medication changes, allergies to medications, adverse drug reactions, diagnosis change, or new procedure performed?: [x] No    [] Yes (see summary sheet for update)  Subjective functional status/changes:   [x] No changes reported    OBJECTIVE  33 min Therapeutic Exercise:  [x] See flow sheet :   Rationale: increase strength and improve coordination to improve the patients ability to perform in the community and home effectively     10 min Manual Therapy:  CFM of L lumbar spine   Rationale: decrease pain and increase tissue extensibility to perform better in the home and the community          With   [x] TE   [] TA   [] neuro   [] other: Patient Education: [x] Review HEP    [] Progressed/Changed HEP based on:   [] positioning   [] body mechanics   [] transfers   [] heat/ice application    [] other:      Other Objective/Functional Measures: FOTO    Pain Level (0-10 scale) post treatment: 2/10    ASSESSMENT/Changes in Function: Pt. Progressing well in therapy session and introduced to new lateral monster walk exercise with good performance. Pt. Given manual therapy CFM to L lumbar spine for pain reduction and tissue extensibility with reported stiffness at onset of session. Patient will continue to benefit from skilled PT services to modify and progress therapeutic interventions, address strength deficits and analyze and cue movement patterns to attain remaining goals.      [x]  See Plan of Care  []  See progress note/recertification  []  See Discharge Summary Progress towards goals / Updated goals:  Progressing  PLAN  [x]  Upgrade activities as tolerated     []  Continue plan of care  []  Update interventions per flow sheet       []  Discharge due to:_  []  Other:_      Eriberto Quarles PT, DPT, EP-C 1/20/2022

## 2022-01-25 ENCOUNTER — HOSPITAL ENCOUNTER (OUTPATIENT)
Dept: PHYSICAL THERAPY | Age: 62
Discharge: HOME | End: 2022-01-25
Payer: COMMERCIAL

## 2022-01-25 PROCEDURE — 97140 MANUAL THERAPY 1/> REGIONS: CPT

## 2022-01-25 PROCEDURE — 97032 APPL MODALITY 1+ESTIM EA 15: CPT

## 2022-01-25 NOTE — PROGRESS NOTES
PT DAILY TREATMENT NOTE     Patient Name: Irasema Shaikh  Date:2022  : 1960  [x]  Patient  Verified  Payor: CHARLY YU / Plan: [facility]NA UMA[facility] [address] / Product Type: PPO /    In time:1000  Out time:1046  Total Treatment Time (min): 46  Visit #: 9    Treatment Area: Knee pain, bilateral [M25.561, M25.562]  Sciatic pain, right [M54.31]  Left sciatic nerve pain [M54.32]    SUBJECTIVE  Pain Level (0-10 scale): 5/10  Any medication changes, allergies to medications, adverse drug reactions, diagnosis change, or new procedure performed?: [x] No    [] Yes (see summary sheet for update)  Subjective functional status/changes:   [] No changes reported  \"My radicular symptoms got worse over the weekend after walking in the snow and have been pretty rough since. \"    OBJECTIVE     Modality rationale: decrease inflammation, decrease pain and increase tissue extensibility to improve the patients ability to perform in the community and home more effectively   Min Type Additional Details   30 [x] Estim:  []Unatt       [x]IFC  []Premod                        []Other: with manual compressions on PSIS (L), with A/P spinous process compressions [x]w/ice (15 min)   [x]w/heat (15 min)  Position: Prone  Location: Lumbar spine    [] Estim: []Att    []TENS instruct  []NMES                    []Other:  []w/US   []w/ice   []w/heat  Position:  Location:    []  Traction: [] Cervical       []Lumbar                       [] Prone          []Supine                       []Intermittent   []Continuous Lbs:  [] before manual  [] after manual    []  Ultrasound: []Continuous   [] Pulsed                           []1MHz   []3MHz W/cm2:  Location:    []  Iontophoresis with dexamethasone         Location: [] Take home patch   [] In clinic    []  Ice     []  heat  []  Ice massage  []  Laser   []  Anodyne Position:  Location:    []  Laser with stim  []  Other:  Position:  Location:    []  Vasopneumatic Device Pressure: [] lo [] med [] hi   Temperature: [] lo [] med [] hi   [x] Skin assessment post-treatment:  [x]intact [x]redness- no adverse reaction    []redness - adverse reaction:     16 min Manual Therapy:  CFM of Lumbar spine with kneading method   Rationale: increase ROM and increase tissue extensibility to perform optimally in the community and home          With   [] TE   [] TA   [] neuro   [x] other: Patient Education: [x] Review HEP    [] Progressed/Changed HEP based on:   [x] positioning   [x] body mechanics   [] transfers   [] heat/ice application    [] other:      Other Objective/Functional Measures: FOTO    Pain Level (0-10 scale) post treatment: 4/10    ASSESSMENT/Changes in Function: Pt. Responded well to today's treatment focusing on alleviation of radicular pain and symptoms arising from increase in pain over the past weekend. Manual therapy was implemented to increase tissue extensibility as well as joint mobility as well as various modalities to help alleviate his pain and symptoms in combination with manual methods. Patient will continue to benefit from skilled PT services to modify and progress therapeutic interventions, analyze and address soft tissue restrictions and analyze and modify body mechanics/ergonomics to attain remaining goals.      [x]  See Plan of Care  []  See progress note/recertification  []  See Discharge Summary         Progress towards goals / Updated goals:  Progressing    PLAN  []  Upgrade activities as tolerated     [x]  Continue plan of care  []  Update interventions per flow sheet       []  Discharge due to:_  []  Other:_      Jayshree Chawla, PT, DPT, EP-C 1/25/2022

## 2022-01-27 ENCOUNTER — HOSPITAL ENCOUNTER (OUTPATIENT)
Dept: PHYSICAL THERAPY | Age: 62
Discharge: HOME | End: 2022-01-27
Payer: COMMERCIAL

## 2022-01-27 PROCEDURE — 97140 MANUAL THERAPY 1/> REGIONS: CPT

## 2022-01-27 PROCEDURE — 97014 ELECTRIC STIMULATION THERAPY: CPT

## 2022-01-27 PROCEDURE — 97110 THERAPEUTIC EXERCISES: CPT

## 2022-01-27 NOTE — PROGRESS NOTES
PT DAILY TREATMENT NOTE     Patient Name: Izzy Carpio  Date:2022  : 1960  [x]  Patient  Verified  Payor: CHARLY YU / Plan: [facility]MA[facility] [address] / Product Type: PPO /    In time:10  Out time:1045  Total Treatment Time (min): 45  Visit #: 10 of 10    Treatment Area: Knee pain, bilateral [M25.561, M25.562]  Sciatic pain, right [M54.31]  Left sciatic nerve pain [M54.32]    SUBJECTIVE  Pain Level (0-10 scale): 0  Any medication changes, allergies to medications, adverse drug reactions, diagnosis change, or new procedure performed?: [x] No    [] Yes (see summary sheet for update)  Subjective functional status/changes:   [] No changes reported  Backed off on heavy core work, had flare up of pain, able to stationary bike, or [employer], works [employer] 4 hours, and that did not flare up. Numbness bilateral feet, left worse than R.   Patient feels ready for D/C    OBJECTIVE    Modality rationale: decrease inflammation to improve the patients ability to perform functional activities   Min Type Additional Details   15 [x] Estim:  [x]Unatt       [x]IFC  []Premod                        []Other:  [x]w/ice   []w/heat  Position: prone  Location:L lumbar/sacral    [] Estim: []Att    []TENS instruct  []NMES                    []Other:  []w/US   []w/ice   []w/heat  Position:  Location:    []  Traction: [] Cervical       []Lumbar                       [] Prone          []Supine                       []Intermittent   []Continuous Lbs:  [] before manual  [] after manual    []  Ultrasound: []Continuous   [] Pulsed                           []1MHz   []3MHz W/cm2:  Location:    []  Iontophoresis with dexamethasone         Location: [] Take home patch   [] In clinic    []  Ice     []  heat  []  Ice massage  []  Laser   []  Anodyne Position:  Location:    []  Laser with stim  []  Other:  Position:  Location:    []  Vasopneumatic Device Pressure:       [] lo [] med [] hi   Temperature: [] lo [] med [] hi   [x] Skin assessment post-treatment:  [x]intact []redness- no adverse reaction    []redness - adverse reaction:     15 min Therapeutic Exercise:  [x] See flow sheet :reviewed and updated HEP   Rationale: increase strength to improve the patients ability to perform functional activities      15 min Manual Therapy:  MFR piriformis, lumbar, PA lumbar   Rationale: decrease pain and decrease trigger points to improve functional mobility and decrease radicular symptoms              With   [] TE   [] TA   [] neuro   [] other: Patient Education: [x] Review HEP    [] Progressed/Changed HEP based on:   [] positioning   [] body mechanics   [] transfers   [] heat/ice application    [] other:         Pain Level (0-10 scale) post treatment: 0    ASSESSMENT/Changes in Function: Patient has intermittent knee pain and back pain which flares up with some activities. Patient is independent in an updated HEP to continue to improve his overall mobility and decrease his radicular symptoms. Discussed bike trip across the country starting in July, pt is starting to train for that and is considering purchasing an electric bike that he can use as needed. Patient will continue to benefit from skilled PT services to modify and progress therapeutic interventions, address functional mobility deficits and address strength deficits to attain remaining goals. [x]  See Plan of Care  []  See progress note/recertification  []  See Discharge Summary         Progress towards goals / Updated goals: Working towards [address] be accomplished in 8 treatments:  Pt. Will adhere and be independent with given HEP  Pt. Will demonstrate decreased valgus moments at knee during eccentric closed chain exercises on 80% of instances  Long Term Goals: To be accomplished in 16 treatments:  Pt. Will report a decrease in pain after biking 10 miles to <2/10 in LLE  Pt. Will be WFL on BLE MMT  Pt.  Will score >72 on FOTO outcome measure  PLAN  []  Upgrade activities as tolerated     []  Continue plan of care  []  Update interventions per flow sheet       [x]  Discharge due to:working towards goals, pt feels ready for D/C_  []  Other:_      Juan Quinones, PT 1/27/2022

## 2022-01-27 NOTE — PROGRESS NOTES
[facility]  [address]  Phone: [phone] Fax: [phone]      Discharge Summary 2-15    Patient name: Danyel Grace  : 1960  Provider#: [id number]  Referral source: Isaac De La O MD      Medical/Treatment Diagnosis: Knee pain, bilateral [M25.561, M25.562]  Sciatic pain, right [M54.31]  Left sciatic nerve pain [M54.32]     Prior Hospitalization: see medical history     Comorbidities: See Plan of Care  Prior Level of Function: See Plan of Care  Medications: Verified on Patient Summary List    Start of Care: 21      Onset Date:21   Visits from Start of Care: 10      Reporting Period : 21 to 22    Assessment/Summary of care:  Patient has intermittent knee pain and back pain which flares up with some activities. Patient is independent in an updated HEP to continue to improve his overall mobility and decrease his radicular symptoms. Discussed bike trip across the country starting in July, pt is starting to train for that and is considering purchasing an electric bike that he can use as needed. He has MET/PROGRESSED towards goals. Short Term Goals: To be accomplished in 8 treatments:  Pt. Will adhere and be independent with given HEP  Pt. Will demonstrate decreased valgus moments at knee during eccentric closed chain exercises on 80% of instances  Long Term Goals: To be accomplished in 16 treatments:  Pt. Will report a decrease in pain after biking 10 miles to <2/10 in LLE  Pt. Will be WFL on BLE MMT  Pt.  Will score >72 on FOTO outcome measure      RECOMMENDATIONS:  [x]Discontinue therapy: [x]Patient has reached or is progressing toward set goals     []Patient is non-compliant or has abdicated     []Due to lack of appreciable progress towards set goals     []Other    Tavia Hernandez, PT 2022

## 2022-03-19 PROBLEM — Z98.890 S/P LUMBAR LAMINECTOMY: Status: ACTIVE | Noted: 2018-11-28

## 2022-03-19 PROBLEM — I48.0 INTERMITTENT ATRIAL FIBRILLATION (HCC): Status: ACTIVE | Noted: 2017-01-16

## 2022-06-13 RX ORDER — MELOXICAM 15 MG/1
15 TABLET ORAL DAILY
Qty: 90 TABLET | Refills: 4 | Status: SHIPPED | OUTPATIENT
Start: 2022-06-13 | End: 2022-06-16 | Stop reason: SDUPTHER

## 2022-06-16 ENCOUNTER — PATIENT MESSAGE (OUTPATIENT)
Dept: FAMILY MEDICINE CLINIC | Age: 62
End: 2022-06-16

## 2022-06-16 RX ORDER — MELOXICAM 15 MG/1
15 TABLET ORAL DAILY
Qty: 90 TABLET | Refills: 4 | Status: SHIPPED | OUTPATIENT
Start: 2022-06-16 | End: 2022-10-21 | Stop reason: ALTCHOICE

## 2022-10-04 DIAGNOSIS — I48.0 INTERMITTENT ATRIAL FIBRILLATION (HCC): ICD-10-CM

## 2022-10-04 RX ORDER — METOPROLOL SUCCINATE 100 MG/1
TABLET, EXTENDED RELEASE ORAL
Qty: 45 TABLET | Refills: 5 | Status: CANCELLED | OUTPATIENT
Start: 2022-10-04

## 2022-10-05 DIAGNOSIS — I48.0 INTERMITTENT ATRIAL FIBRILLATION (HCC): ICD-10-CM

## 2022-10-05 RX ORDER — METOPROLOL SUCCINATE 100 MG/1
TABLET, EXTENDED RELEASE ORAL
Qty: 45 TABLET | Refills: 5 | Status: SHIPPED | OUTPATIENT
Start: 2022-10-05 | End: 2022-10-05 | Stop reason: SDUPTHER

## 2022-10-05 RX ORDER — METOPROLOL SUCCINATE 100 MG/1
TABLET, EXTENDED RELEASE ORAL
Qty: 45 TABLET | Refills: 5 | Status: SHIPPED | OUTPATIENT
Start: 2022-10-05

## 2022-10-18 NOTE — PROGRESS NOTES
Mr. Joann Fraire is a 57 y.o. male who is here for evaluation of   Chief Complaint   Patient presents with    Physical     Routine yearly F/U    Advice Only     Wants to dicuss family HX of prostate CA and Colon CA    Back Pain    Immunization/Injection     Flu vaccine provided   . ASSESSMENT AND PLAN:    1. Annual physical exam  2. Mixed hyperlipidemia  3. Intermittent atrial fibrillation (HCC)  4. Bilateral sciatica  5. Nocturia  6. 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    He is interested in age-appropriate screening and to assess interval follow-up for lipids, paroxysmal atrial fibrillation, lower back pain and sciatica. He underwent laminectomy with Dr. Mayito Cuadra in 2019. He developed acute discitis and was hospitalized briefly at [facility] and later treated with several weeks of IV antibiotics with resolution. He has since seen Dr. Angela Francis regarding left foot numbness and left knee pain. Since our last visit, he has ridden his bicycle from [facility] to [address] [place] [address]. His back and knee did not really give him any trouble at all during the trip. Since his wife is no longer working he does not have high-quality health insurance outside the market. He currently pays $70 a month and has an $8000 deductible. He would like to defer all testing until he acquires Medicare sometime in May next year. ROS:  Denies fever, chills, cough, chest pain, SOB,  nausea, vomiting, or diarrhea. Denies wt loss, wt gain, hemoptysis, hematochezia or melena.     Physical Examination:    Visit Vitals  /60 (BP 1 Location: Left upper arm, BP Patient Position: Sitting, BP Cuff Size: Adult long)   Pulse 62 Temp 98.4 °F (36.9 °C) (Temporal)   Resp 16   Ht 6' (1.829 m)   Wt 190 lb 9.6 oz (86.5 kg)   SpO2 98%   BMI 25.85 kg/m²      General:  Alert, cooperative, no distress. Head:  Normocephalic, without obvious abnormality, atraumatic. Eyes:  Conjunctivae/corneas clear. Pupils equal, round, reactive to light. Extraocular movements intact. Lungs:   Clear to auscultation bilaterally. Chest wall:  No tenderness or deformity. Cardiac:  RRR   Abdomen:   Soft, non-tender. Bowel sounds normal. No masses. No organomegaly. Extremities: Extremities normal, atraumatic, no cyanosis or edema. Pulses: 2+ and symmetric all extremities. Skin: Skin color, texture, turgor normal. No rashes or lesions. Lymph nodes: Cervical, supraclavicular, and axillary nodes normal.   Neurologic: CNII-XII intact. Normal strength, sensation, and reflexes throughout. On this date 10/21/2022 I have spent 30 minutes reviewing previous notes, test results and face to face with the patient discussing the diagnosis and importance of compliance with the treatment plan as well as documenting on the day of the visit.     Lance Smith MD FACP    (signed electronically) on 10/21/2022 at 3:38 PM

## 2022-10-21 ENCOUNTER — OFFICE VISIT (OUTPATIENT)
Dept: FAMILY MEDICINE CLINIC | Age: 62
End: 2022-10-21
Payer: COMMERCIAL

## 2022-10-21 VITALS
TEMPERATURE: 98.4 F | BODY MASS INDEX: 25.81 KG/M2 | OXYGEN SATURATION: 98 % | HEART RATE: 62 BPM | SYSTOLIC BLOOD PRESSURE: 112 MMHG | DIASTOLIC BLOOD PRESSURE: 60 MMHG | HEIGHT: 72 IN | WEIGHT: 190.6 LBS | RESPIRATION RATE: 16 BRPM

## 2022-10-21 DIAGNOSIS — M54.31 BILATERAL SCIATICA: ICD-10-CM

## 2022-10-21 DIAGNOSIS — Z23 NEEDS FLU SHOT: ICD-10-CM

## 2022-10-21 DIAGNOSIS — R35.1 NOCTURIA: ICD-10-CM

## 2022-10-21 DIAGNOSIS — E78.2 MIXED HYPERLIPIDEMIA: ICD-10-CM

## 2022-10-21 DIAGNOSIS — M54.32 BILATERAL SCIATICA: ICD-10-CM

## 2022-10-21 DIAGNOSIS — Z00.00 ANNUAL PHYSICAL EXAM: Primary | ICD-10-CM

## 2022-10-21 DIAGNOSIS — I48.0 INTERMITTENT ATRIAL FIBRILLATION (HCC): ICD-10-CM

## 2022-10-21 PROCEDURE — 99396 PREV VISIT EST AGE 40-64: CPT | Performed by: INTERNAL MEDICINE

## 2022-10-21 PROCEDURE — 90471 IMMUNIZATION ADMIN: CPT | Performed by: INTERNAL MEDICINE

## 2022-10-21 PROCEDURE — 90686 IIV4 VACC NO PRSV 0.5 ML IM: CPT | Performed by: INTERNAL MEDICINE

## 2022-10-21 NOTE — PROGRESS NOTES
Campos Garcia is a 57 y.o. male presenting for/with:    Chief Complaint   Patient presents with    Physical     Routine yearly F/U    Advice Only     Wants to dicuss family HX of prostate CA and Colon CA    Back Pain    Immunization/Injection     Flu vaccine provided       Visit Vitals  /60 (BP 1 Location: Left upper arm, BP Patient Position: Sitting, BP Cuff Size: Adult long)   Pulse 62   Temp 98.4 °F (36.9 °C) (Temporal)   Resp 16   Ht 6' (1.829 m)   Wt 190 lb 9.6 oz (86.5 kg)   SpO2 98%   BMI 25.85 kg/m²     Pain Scale: 4/10  Pain Location: Generalized    1. \"Have you been to the ER, urgent care clinic since your last visit? Hospitalized since your last visit? \" No    2. \"Have you seen or consulted any other health care providers outside of the [address] [place] [address] since your last visit? \" No     3. For patients aged 39-70: Has the patient had a colonoscopy / FIT/ Cologuard? Yes - no Care Gap present      If the patient is female:    4. For patients aged 41-77: Has the patient had a mammogram within the past 2 years? NA - based on age or sex      11. For patients aged 21-65: Has the patient had a pap smear? NA - based on age or sex      Learning Assessment 11/11/2021   PRIMARY LEARNER Patient   HIGHEST LEVEL OF EDUCATION - PRIMARY LEARNER  -   BARRIERS PRIMARY LEARNER -   [address] [place] [address] NAME -   PRIMARY LANGUAGE ENGLISH   LEARNER PREFERENCE PRIMARY READING   ANSWERED BY patient   RELATIONSHIP SELF     Fall Risk Assessment, last 12 mths 10/21/2022   Able to walk? Yes   Fall in past 12 months? 0   Do you feel unsteady? 0   Are you worried about falling 0       3 most recent PHQ Screens 10/21/2022   Little interest or pleasure in doing things Not at all   Feeling down, depressed, irritable, or hopeless Not at all   Total Score PHQ 2 0     Abuse Screening Questionnaire 10/21/2022   Do you ever feel afraid of your partner?  N   Are you in a relationship with someone who physically or mentally threatens you? N   Is it safe for you to go home? Y       ADL Assessment 10/21/2022   Feeding yourself No Help Needed   Getting from bed to chair No Help Needed   Getting dressed No Help Needed   Bathing or showering No Help Needed   Walk across the room (includes cane/walker) No Help Needed   Using the telphone No Help Needed   Taking your medications No Help Needed   Preparing meals No Help Needed   Managing money (expenses/bills) No Help Needed   Moderately strenuous housework (laundry) No Help Needed   Shopping for personal items (toiletries/medicines) No Help Needed   Shopping for groceries No Help Needed   Driving No Help Needed   Climbing a flight of stairs No Help Needed   Getting to places beyond walking distances No Help Needed      Advance Care Planning 11/11/2021   Patient's Healthcare Decision Maker is: Legal Next of Kin   Confirm Advance Directive Yes, not on file   Patient Would Like to Complete Advance Directive Yes      After obtaining consent, and per orders of Dr. Gladys Booth, injection of Fluad to (L) deltoid given by [address] [place] [address]. Patient instructed to remain in clinic for 20 minutes afterwards, and to report any adverse reaction to me immediately.

## 2022-10-21 NOTE — PATIENT INSTRUCTIONS
Vaccine Information Statement    Influenza (Flu) Vaccine (Inactivated or Recombinant): What You Need to Know    Many vaccine information statements are available in [place] and other languages. See www.immunize.org/vis. Hojas de información sobre vacunas están disponibles en español y en muchos otros idiomas. Visite www.immunize.org/vis. 1. Why get vaccinated? Influenza vaccine can prevent influenza (flu). Flu is a contagious disease that spreads around the [facility] every year, usually between October and May. Anyone can get the flu, but it is more dangerous for some people. Infants and young children, people 72 years and older, pregnant people, and people with certain health conditions or a weakened immune system are at greatest risk of flu complications. Pneumonia, bronchitis, sinus infections, and ear infections are examples of flu-related complications. If you have a medical condition, such as heart disease, cancer, or diabetes, flu can make it worse. Flu can cause fever and chills, sore throat, muscle aches, fatigue, cough, headache, and runny or stuffy nose. Some people may have vomiting and diarrhea, though this is more common in children than adults. In an average year, thousands of people in the [facility] die from flu, and many more are hospitalized. Flu vaccine prevents millions of illnesses and flu-related visits to the doctor each year. 2. Influenza vaccines     CDC recommends everyone 6 months and older get vaccinated every flu season. Children 6 months through 6years of age may need 2 doses during a single flu season. Everyone else needs only 1 dose each flu season. It takes about 2 weeks for protection to develop after vaccination. There are many flu viruses, and they are always changing. Each year a new flu vaccine is made to protect against the influenza viruses believed to be likely to cause disease in the upcoming flu season.  Even when the vaccine doesnt exactly match these viruses, it may still provide some protection. Influenza vaccine does not cause flu. Influenza vaccine may be given at the same time as other vaccines. 3. Talk with your health care provider    Tell your vaccination provider if the person getting the vaccine:  Has had an allergic reaction after a previous dose of influenza vaccine, or has any severe, life-threatening allergies   Has ever had Guillain-Barré Syndrome (also called GBS)    In some cases, your health care provider may decide to postpone influenza vaccination until a future visit. Influenza vaccine can be administered at any time during pregnancy. People who are or will be pregnant during influenza season should receive inactivated influenza vaccine. People with minor illnesses, such as a cold, may be vaccinated. People who are moderately or severely ill should usually wait until they recover before getting influenza vaccine. Your health care provider can give you more information. 4. Risks of a vaccine reaction    Soreness, redness, and swelling where the shot is given, fever, muscle aches, and headache can happen after influenza vaccination. There may be a very small increased risk of Guillain-Barré Syndrome (GBS) after inactivated influenza vaccine (the flu shot). Serena Brandon children who get the flu shot along with pneumococcal vaccine (PCV13) and/or DTaP vaccine at the same time might be slightly more likely to have a seizure caused by fever. Tell your health care provider if a child who is getting flu vaccine has ever had a seizure. People sometimes faint after medical procedures, including vaccination. Tell your provider if you feel dizzy or have vision changes or ringing in the ears. As with any medicine, there is a very remote chance of a vaccine causing a severe allergic reaction, other serious injury, or death. 5. What if there is a serious problem?     An allergic reaction could occur after the vaccinated person leaves the clinic. If you see signs of a severe allergic reaction (hives, swelling of the face and throat, difficulty breathing, a fast heartbeat, dizziness, or weakness), call 9-1-1 and get the person to the nearest hospital.    For other signs that concern you, call your health care provider. Adverse reactions should be reported to the Vaccine Adverse Event Reporting System (VAERS). Your health care provider will usually file this report, or you can do it yourself. Visit the VAERS website at www.vaers. [facility].gov or call 1-224.933.8805. VAERS is only for reporting reactions, and VAERS staff members do not give medical advice. 6. The National Vaccine Injury Compensation Program    The [facility] Vaccine Injury Compensation Program (VICP) is a federal program that was created to compensate people who may have been injured by certain vaccines. Claims regarding alleged injury or death due to vaccination have a time limit for filing, which may be as short as two years. Visit the VICP website at www.[facility].gov/vaccinecompensation or call 0-149.417.2996 to learn about the program and about filing a claim. 7. How can I learn more? Ask your health care provider. Call your local or state health department. Visit the website of the Food and Drug Administration (FDA) for vaccine package inserts and additional information at www.fda.gov/vaccines-blood-biologics/vaccines. Contact the Centers for Disease Control and Prevention (CDC): Call 1-417.699.3780 (1-800-CDC-INFO) or  Visit CDCs influenza website at www.cdc.gov/flu. Vaccine Information Statement   Inactivated Influenza Vaccine   8/6/2021  [address] [id number]   Department of Health and Human Services  Centers for Disease Control and Prevention    Office Use Only

## 2023-06-26 ENCOUNTER — OFFICE VISIT (OUTPATIENT)
Age: 63
End: 2023-06-26

## 2023-06-26 VITALS — BODY MASS INDEX: 27.63 KG/M2 | WEIGHT: 204 LBS | HEIGHT: 72 IN

## 2023-06-26 DIAGNOSIS — Z86.010 HX OF COLONIC POLYPS: ICD-10-CM

## 2023-06-26 DIAGNOSIS — Z80.0 FAMILY HISTORY OF COLON CANCER IN FATHER: Primary | ICD-10-CM

## 2023-06-26 ASSESSMENT — PATIENT HEALTH QUESTIONNAIRE - PHQ9
SUM OF ALL RESPONSES TO PHQ9 QUESTIONS 1 & 2: 0
SUM OF ALL RESPONSES TO PHQ QUESTIONS 1-9: 0
1. LITTLE INTEREST OR PLEASURE IN DOING THINGS: 0
SUM OF ALL RESPONSES TO PHQ QUESTIONS 1-9: 0
2. FEELING DOWN, DEPRESSED OR HOPELESS: 0
SUM OF ALL RESPONSES TO PHQ QUESTIONS 1-9: 0
SUM OF ALL RESPONSES TO PHQ QUESTIONS 1-9: 0

## 2023-07-14 PROBLEM — I48.0 PAF (PAROXYSMAL ATRIAL FIBRILLATION) (HCC): Status: ACTIVE | Noted: 2017-01-16

## 2023-07-18 ENCOUNTER — OFFICE VISIT (OUTPATIENT)
Age: 63
End: 2023-07-18
Payer: MEDICARE

## 2023-07-18 VITALS
BODY MASS INDEX: 27.5 KG/M2 | HEART RATE: 55 BPM | RESPIRATION RATE: 20 BRPM | DIASTOLIC BLOOD PRESSURE: 82 MMHG | HEIGHT: 72 IN | TEMPERATURE: 97.7 F | OXYGEN SATURATION: 96 % | WEIGHT: 203 LBS | SYSTOLIC BLOOD PRESSURE: 110 MMHG

## 2023-07-18 DIAGNOSIS — I48.0 PAF (PAROXYSMAL ATRIAL FIBRILLATION) (HCC): Primary | ICD-10-CM

## 2023-07-18 PROCEDURE — 93000 ELECTROCARDIOGRAM COMPLETE: CPT | Performed by: INTERNAL MEDICINE

## 2023-07-18 PROCEDURE — 99213 OFFICE O/P EST LOW 20 MIN: CPT | Performed by: INTERNAL MEDICINE

## 2023-07-18 PROCEDURE — 1123F ACP DISCUSS/DSCN MKR DOCD: CPT | Performed by: INTERNAL MEDICINE

## 2023-07-18 ASSESSMENT — PATIENT HEALTH QUESTIONNAIRE - PHQ9
SUM OF ALL RESPONSES TO PHQ QUESTIONS 1-9: 0
SUM OF ALL RESPONSES TO PHQ QUESTIONS 1-9: 0
SUM OF ALL RESPONSES TO PHQ9 QUESTIONS 1 & 2: 0
1. LITTLE INTEREST OR PLEASURE IN DOING THINGS: 0
2. FEELING DOWN, DEPRESSED OR HOPELESS: 0
SUM OF ALL RESPONSES TO PHQ QUESTIONS 1-9: 0
SUM OF ALL RESPONSES TO PHQ QUESTIONS 1-9: 0

## 2023-07-27 ENCOUNTER — ANESTHESIA EVENT (OUTPATIENT)
Facility: HOSPITAL | Age: 63
End: 2023-07-27
Payer: MEDICARE

## 2023-08-09 ENCOUNTER — PREP FOR PROCEDURE (OUTPATIENT)
Age: 63
End: 2023-08-09

## 2023-08-09 RX ORDER — SODIUM CHLORIDE 0.9 % (FLUSH) 0.9 %
5-40 SYRINGE (ML) INJECTION PRN
Status: CANCELLED | OUTPATIENT
Start: 2023-08-09

## 2023-08-09 RX ORDER — SODIUM CHLORIDE 0.9 % (FLUSH) 0.9 %
5-40 SYRINGE (ML) INJECTION EVERY 12 HOURS SCHEDULED
Status: CANCELLED | OUTPATIENT
Start: 2023-08-09

## 2023-08-09 RX ORDER — SODIUM CHLORIDE, SODIUM LACTATE, POTASSIUM CHLORIDE, CALCIUM CHLORIDE 600; 310; 30; 20 MG/100ML; MG/100ML; MG/100ML; MG/100ML
INJECTION, SOLUTION INTRAVENOUS CONTINUOUS
Status: CANCELLED | OUTPATIENT
Start: 2023-08-09

## 2023-08-09 RX ORDER — SODIUM CHLORIDE 9 MG/ML
INJECTION, SOLUTION INTRAVENOUS PRN
Status: CANCELLED | OUTPATIENT
Start: 2023-08-09

## 2023-08-09 NOTE — H&P
HPI    68-year-old patient of Dr. Faisal Beasley who presents to me for screening colonoscopy for family history of colon cancer. His father had colon cancer when he was in his late 62s. He has had at least 4 colonoscopies with the last one being 5 years ago. Last 3 had no polyps and the first 1 had a few colon polyps. He denies any personal history of melena or hematochezia or diarrhea or constipation. He has had no abdominal pain or unexpected weight loss. He has had no change in the caliber of his stool and no recent stool testing. He has had a history of a lumbar laminectomy at L5 as well as a hydrocele and a right knee arthroscopy. He has a history of paroxysmal A-fib that happens about once a year and last for about 15 hours. He is not on aspirin but does take meloxicam.    He has been vaccinated for COVID. He does not smoke and he may drink 2 glasses of wine a day.   Past Medical History:   Diagnosis Date    Asthma, mild intermittent     Atrial fibrillation ([address]) 2010    Intermittent    Cancer ([address]) 1995    Bcc    Chronic back pain 2017    Post laminectomy    Chronic pain 2018    Family history of prostate cancer     Grandfather    Foot drop, left     Intermittent atrial fibrillation ([address]) 01/16/2017    Left hydrocele     Lumbar herniated disc     Personal history of colonic polyps     Negative colonoscopy 2015    Vitamin D deficiency        Past Surgical History:   Procedure Laterality Date    COLONOSCOPY  06/11/2015    COLONOSCOPY N/A 6/15/2018    COLONOSCOPY performed by Kary Kelly MD at [address]; HI RISK IND  6/15/2018         IR PERC ASPIRATION INTERVERT [facility]  01/11/2019    IR PERC ASPIRATION INTERVERT [facility] 1/11/2019 [facility] RAD ANGIO IR    IR PERC ASPIRATION INTERVERT DISC  1/11/2019    KNEE ARTHROPLASTY Right 2009    UROLOGICAL SURGERY  2018    hydrocele repair       Social History     Socioeconomic History    Marital status:      Spouse name:

## 2023-08-09 NOTE — H&P (VIEW-ONLY)
HPI    68-year-old patient of Dr. Thea Kingston who presents to me for screening colonoscopy for family history of colon cancer. His father had colon cancer when he was in his late 62s. He has had at least 4 colonoscopies with the last one being 5 years ago. Last 3 had no polyps and the first 1 had a few colon polyps. He denies any personal history of melena or hematochezia or diarrhea or constipation. He has had no abdominal pain or unexpected weight loss. He has had no change in the caliber of his stool and no recent stool testing. He has had a history of a lumbar laminectomy at L5 as well as a hydrocele and a right knee arthroscopy. He has a history of paroxysmal A-fib that happens about once a year and last for about 15 hours. He is not on aspirin but does take meloxicam.    He has been vaccinated for COVID. He does not smoke and he may drink 2 glasses of wine a day.   Past Medical History:   Diagnosis Date    Asthma, mild intermittent     Atrial fibrillation ([address]) 2010    Intermittent    Cancer ([address]) 1995    Bcc    Chronic back pain 2017    Post laminectomy    Chronic pain 2018    Family history of prostate cancer     Grandfather    Foot drop, left     Intermittent atrial fibrillation ([address]) 01/16/2017    Left hydrocele     Lumbar herniated disc     Personal history of colonic polyps     Negative colonoscopy 2015    Vitamin D deficiency        Past Surgical History:   Procedure Laterality Date    COLONOSCOPY  06/11/2015    COLONOSCOPY N/A 6/15/2018    COLONOSCOPY performed by Elijah Schneider MD at [address]; HI RISK IND  6/15/2018         IR PERC ASPIRATION INTERVERT [facility]  01/11/2019    IR PERC ASPIRATION INTERVERT [facility] 1/11/2019 [facility] RAD ANGIO IR    IR PERC ASPIRATION INTERVERT DISC  1/11/2019    KNEE ARTHROPLASTY Right 2009    UROLOGICAL SURGERY  2018    hydrocele repair       Social History     Socioeconomic History    Marital status:      Spouse name:

## 2023-08-10 ENCOUNTER — ANESTHESIA (OUTPATIENT)
Facility: HOSPITAL | Age: 63
End: 2023-08-10
Payer: MEDICARE

## 2023-08-10 ENCOUNTER — HOSPITAL ENCOUNTER (OUTPATIENT)
Facility: HOSPITAL | Age: 63
Setting detail: OUTPATIENT SURGERY
Discharge: HOME | End: 2023-08-10
Attending: SURGERY | Admitting: SURGERY
Payer: MEDICARE

## 2023-08-10 PROBLEM — Z80.0 FAMILY HISTORY OF COLON CANCER IN FATHER: Status: ACTIVE | Noted: 2023-08-10

## 2023-08-10 PROCEDURE — 3700000000 HC ANESTHESIA ATTENDED CARE: Performed by: SURGERY

## 2023-08-10 PROCEDURE — 3600000002 HC SURGERY LEVEL 2 BASE: Performed by: SURGERY

## 2023-08-10 PROCEDURE — 88305 TISSUE EXAM BY PATHOLOGIST: CPT

## 2023-08-10 PROCEDURE — 7100000000 HC PACU RECOVERY - FIRST 15 MIN: Performed by: SURGERY

## 2023-08-10 PROCEDURE — 6360000002 HC RX W HCPCS: Performed by: ANESTHESIOLOGY

## 2023-08-10 PROCEDURE — 3600000012 HC SURGERY LEVEL 2 ADDTL 15MIN: Performed by: SURGERY

## 2023-08-10 PROCEDURE — 2500000003 HC RX 250 WO HCPCS: Performed by: ANESTHESIOLOGY

## 2023-08-10 PROCEDURE — 3700000001 HC ADD 15 MINUTES (ANESTHESIA): Performed by: SURGERY

## 2023-08-10 PROCEDURE — 7100000001 HC PACU RECOVERY - ADDTL 15 MIN: Performed by: SURGERY

## 2023-08-10 PROCEDURE — 2580000003 HC RX 258: Performed by: SURGERY

## 2023-08-10 PROCEDURE — 2709999900 HC NON-CHARGEABLE SUPPLY: Performed by: SURGERY

## 2023-08-10 RX ORDER — SODIUM CHLORIDE, SODIUM LACTATE, POTASSIUM CHLORIDE, CALCIUM CHLORIDE 600; 310; 30; 20 MG/100ML; MG/100ML; MG/100ML; MG/100ML
INJECTION, SOLUTION INTRAVENOUS CONTINUOUS
Status: DISCONTINUED | OUTPATIENT
Start: 2023-08-10 | End: 2023-08-10 | Stop reason: HOSPADM

## 2023-08-10 RX ORDER — LIDOCAINE HYDROCHLORIDE 20 MG/ML
INJECTION, SOLUTION EPIDURAL; INFILTRATION; INTRACAUDAL; PERINEURAL PRN
Status: DISCONTINUED | OUTPATIENT
Start: 2023-08-10 | End: 2023-08-10 | Stop reason: SDUPTHER

## 2023-08-10 RX ORDER — PROPOFOL 10 MG/ML
INJECTION, EMULSION INTRAVENOUS PRN
Status: DISCONTINUED | OUTPATIENT
Start: 2023-08-10 | End: 2023-08-10 | Stop reason: SDUPTHER

## 2023-08-10 RX ORDER — SODIUM CHLORIDE 9 MG/ML
INJECTION, SOLUTION INTRAVENOUS PRN
Status: DISCONTINUED | OUTPATIENT
Start: 2023-08-10 | End: 2023-08-10 | Stop reason: HOSPADM

## 2023-08-10 RX ORDER — MIDAZOLAM HYDROCHLORIDE 1 MG/ML
INJECTION INTRAMUSCULAR; INTRAVENOUS PRN
Status: DISCONTINUED | OUTPATIENT
Start: 2023-08-10 | End: 2023-08-10 | Stop reason: SDUPTHER

## 2023-08-10 RX ORDER — SODIUM CHLORIDE 0.9 % (FLUSH) 0.9 %
5-40 SYRINGE (ML) INJECTION PRN
Status: DISCONTINUED | OUTPATIENT
Start: 2023-08-10 | End: 2023-08-10 | Stop reason: HOSPADM

## 2023-08-10 RX ORDER — PROPOFOL 10 MG/ML
INJECTION, EMULSION INTRAVENOUS CONTINUOUS PRN
Status: DISCONTINUED | OUTPATIENT
Start: 2023-08-10 | End: 2023-08-10 | Stop reason: SDUPTHER

## 2023-08-10 RX ORDER — SODIUM CHLORIDE 0.9 % (FLUSH) 0.9 %
5-40 SYRINGE (ML) INJECTION EVERY 12 HOURS SCHEDULED
Status: DISCONTINUED | OUTPATIENT
Start: 2023-08-10 | End: 2023-08-10 | Stop reason: HOSPADM

## 2023-08-10 RX ADMIN — MIDAZOLAM 2 MG: 1 INJECTION INTRAMUSCULAR; INTRAVENOUS at 10:22

## 2023-08-10 RX ADMIN — PROPOFOL 100 MCG/KG/MIN: 10 INJECTION, EMULSION INTRAVENOUS at 10:29

## 2023-08-10 RX ADMIN — SODIUM CHLORIDE, POTASSIUM CHLORIDE, SODIUM LACTATE AND CALCIUM CHLORIDE: 600; 310; 30; 20 INJECTION, SOLUTION INTRAVENOUS at 10:23

## 2023-08-10 RX ADMIN — LIDOCAINE HYDROCHLORIDE 100 MG: 20 INJECTION, SOLUTION EPIDURAL; INFILTRATION; INTRACAUDAL; PERINEURAL at 10:22

## 2023-08-10 RX ADMIN — PROPOFOL 50 MG: 10 INJECTION, EMULSION INTRAVENOUS at 10:27

## 2023-08-10 RX ADMIN — PROPOFOL 50 MG: 10 INJECTION, EMULSION INTRAVENOUS at 10:26

## 2023-08-10 ASSESSMENT — PAIN - FUNCTIONAL ASSESSMENT: PAIN_FUNCTIONAL_ASSESSMENT: NONE - DENIES PAIN

## 2023-08-10 NOTE — INTERVAL H&P NOTE
Update History & Physical    The patient's History and Physical of August 9, 2023 was reviewed with the patient and I examined the patient. There was no change. The surgical site was confirmed by the patient and me. Plan: The risks, benefits, expected outcome, and alternative to the recommended procedure have been discussed with the patient. Patient understands and wants to proceed with the procedure.      Electronically signed by Flori Maradiaga MD on 8/10/2023 at 10:06 AM

## 2023-08-10 NOTE — OP NOTE
[facility]  OPERATIVE REPORT    Name:  Anali Del Real  MR#:  [id number]  :  1960  ACCOUNT #:  [de-identified]  DATE OF SERVICE:  08/10/2023    PREOPERATIVE DIAGNOSES:  Family history of colon cancer and personal history of colon polyps. POSTOPERATIVE DIAGNOSES:  Family history of colon cancer and personal history of colon polyps. PROCEDURE PERFORMED:  Colonoscopy with cold biopsy polypectomy. SURGEON:  Halina Montes MD    ASSISTANT:  None. ANESTHESIA:  MAC.    COMPLICATIONS:  None. SPECIMENS REMOVED:  Polyp at 60 cm. IMPLANTS:  None. DRAINS:  None. ESTIMATED BLOOD LOSS:  Minimal.    FINDINGS:  1.  Polyp at 60 cm. 2.  Diverticulosis. 3.  Internal hemorrhoids. DISPOSITION:  Stable. PROCEDURE:  The patient was brought to the operative theater. Monitoring devices and nasal cannula O2 were placed per Anesthesia and the patient was placed in left side down decubitus position. IV sedation was administered and a time-out was performed. Digital rectal exam was performed, which was essentially normal.  A well-lubricated Olympus colonoscope was introduced in the patient's rectum and advanced to the cecum. Cecum was identified by the appendiceal orifice and the ileocecal valve. Gentle abdominal pressure needed to be applied briefly. The scope was then carefully withdrawn with the mucosal surfaces circumferentially evaluated. No significant abnormalities were noted in the cecum. The patient did have diffuse pan diverticulosis that was most pronounced in the sigmoid colon. No other abnormalities were noted in the ascending or transverse colon. Descending colon showed no significant abnormalities other than at 60 cm, a small polypoid lesion was identified and removed in its entirety by cold biopsy forceps. Again, the patient did have extensive sigmoid diverticulosis. The scope was pulled back into the rectum and retroflexed, which revealed internal hemorrhoids.

## 2023-08-10 NOTE — BRIEF OP NOTE
Brief Postoperative Note      Patient: Sherine Love  YOB: 1960  MRN: [id number]    Date of Procedure: 8/10/2023    Pre-Op Diagnosis Codes:     * Family history of malignant neoplasm of gastrointestinal tract [Z80.0]     * Hx of colonic polyps [Z86.010]    Post-Op Diagnosis: Same       Procedure(s):  COLONOSCOPY WITH POLYPECTOMY    Surgeon(s):  Adelina Hernandez MD    Assistant:  * No surgical staff found *    Anesthesia: Monitor Anesthesia Care    Estimated Blood Loss (mL): Minimal    Complications: None    Specimens:   ID Type Source Tests Collected by Time Destination   A : polyp at 60 cm Tissue Colon SURGICAL PATHOLOGY Adelina Hernandez MD 8/10/2023 1039        Implants:  * No implants in log *      Drains: * No LDAs found *    Findings:   Polyp at 60 cm  Diverticulosis  Internal hemorrhoids        Electronically signed by Luis Bangura MD on 8/10/2023 at 10:52 AM

## 2023-08-10 NOTE — ANESTHESIA POSTPROCEDURE EVALUATION
Department of Anesthesiology  Postprocedure Note    Patient: Carley Gutierrez  MRN: [id number]  YOB: 1960  Date of evaluation: 8/10/2023      Procedure Summary     Date: 08/10/23 Room / Location: [address] MAIN OR 1 / [address] MAIN OR    Anesthesia Start: 1020 Anesthesia Stop: 1053    Procedure: COLONOSCOPY WITH POLYPECTOMY (Lower GI Region) Diagnosis:       Family history of malignant neoplasm of gastrointestinal tract      Hx of colonic polyps      (Family history of malignant neoplasm of gastrointestinal tract [Z80.0])      (Hx of colonic polyps [Z86.010])    Surgeons: Kylah Torres MD Responsible Provider: Pat Maldonado MD    Anesthesia Type: MAC ASA Status: 3          Anesthesia Type: No value filed.     Inocencia Phase I:      Inocencia Phase II:        Anesthesia Post Evaluation    Patient location during evaluation: PACU  Patient participation: complete - patient participated  Level of consciousness: awake and alert  Pain score: 0  Airway patency: patent  Nausea & Vomiting: no nausea and no vomiting  Complications: no  Cardiovascular status: blood pressure returned to baseline  Respiratory status: acceptable  Hydration status: euvolemic

## 2023-08-11 VITALS
WEIGHT: 190 LBS | DIASTOLIC BLOOD PRESSURE: 79 MMHG | OXYGEN SATURATION: 96 % | RESPIRATION RATE: 13 BRPM | HEIGHT: 72 IN | TEMPERATURE: 97.8 F | SYSTOLIC BLOOD PRESSURE: 113 MMHG | HEART RATE: 53 BPM | BODY MASS INDEX: 25.73 KG/M2

## 2023-08-21 ENCOUNTER — OFFICE VISIT (OUTPATIENT)
Age: 63
End: 2023-08-21
Payer: MEDICARE

## 2023-08-21 ENCOUNTER — TELEPHONE (OUTPATIENT)
Age: 63
End: 2023-08-21

## 2023-08-21 VITALS
RESPIRATION RATE: 16 BRPM | TEMPERATURE: 97.8 F | DIASTOLIC BLOOD PRESSURE: 71 MMHG | WEIGHT: 201 LBS | HEIGHT: 72 IN | OXYGEN SATURATION: 96 % | BODY MASS INDEX: 27.22 KG/M2 | SYSTOLIC BLOOD PRESSURE: 113 MMHG | HEART RATE: 56 BPM

## 2023-08-21 DIAGNOSIS — Z86.010 HX OF COLONIC POLYPS: Primary | ICD-10-CM

## 2023-08-21 DIAGNOSIS — K57.90 DIVERTICULOSIS: ICD-10-CM

## 2023-08-21 DIAGNOSIS — I48.0 PAROXYSMAL ATRIAL FIBRILLATION (HCC): Primary | ICD-10-CM

## 2023-08-21 PROCEDURE — 3017F COLORECTAL CA SCREEN DOC REV: CPT | Performed by: SURGERY

## 2023-08-21 PROCEDURE — 1123F ACP DISCUSS/DSCN MKR DOCD: CPT | Performed by: SURGERY

## 2023-08-21 PROCEDURE — G8419 CALC BMI OUT NRM PARAM NOF/U: HCPCS | Performed by: SURGERY

## 2023-08-21 PROCEDURE — 99213 OFFICE O/P EST LOW 20 MIN: CPT | Performed by: SURGERY

## 2023-08-21 PROCEDURE — G8427 DOCREV CUR MEDS BY ELIG CLIN: HCPCS | Performed by: SURGERY

## 2023-08-21 PROCEDURE — 1036F TOBACCO NON-USER: CPT | Performed by: SURGERY

## 2023-08-21 ASSESSMENT — PATIENT HEALTH QUESTIONNAIRE - PHQ9
SUM OF ALL RESPONSES TO PHQ QUESTIONS 1-9: 0
SUM OF ALL RESPONSES TO PHQ QUESTIONS 1-9: 0
SUM OF ALL RESPONSES TO PHQ9 QUESTIONS 1 & 2: 0
2. FEELING DOWN, DEPRESSED OR HOPELESS: 0
1. LITTLE INTEREST OR PLEASURE IN DOING THINGS: 0
SUM OF ALL RESPONSES TO PHQ QUESTIONS 1-9: 0
SUM OF ALL RESPONSES TO PHQ QUESTIONS 1-9: 0

## 2023-08-21 NOTE — TELEPHONE ENCOUNTER
Verified Patient with two identifiers. Patient called and wanted some advise from Dr. Parmjit Palomo. Patient was last seen 7/18/23 by Dr. Parmjit Palomo. Pt stated that they had discussed that he regularly has PAF 1-2 a year. Pt wanted to let the MD to know that he had two episodes of PAF since LOV. 1st episode was noted two weeks after LOV and lasted approximately 12 hours and second happened yesterday 8/20/23 and lasted for approximately 23 hours. He is about to leave in a week for one month. Pt question: does the MD want to change anything in his regime?

## 2023-08-21 NOTE — PROGRESS NOTES
Identified pt with two pt identifiers (name and ). Reviewed chart in preparation for visit and have obtained necessary documentation. Nory Stroud is a 70 y.o. male Post-Op Check (Colonoscopy result)  . There were no vitals filed for this visit. 1. Have you been to the ER, urgent care clinic since your last visit? Hospitalized since your last visit?  no     2. Have you seen or consulted any other health care providers outside of the [address] [place] [address] since your last visit? Include any pap smears or colon screening.   no

## 2023-08-22 ENCOUNTER — CLINICAL DOCUMENTATION (OUTPATIENT)
Age: 63
End: 2023-08-22

## 2023-08-22 DIAGNOSIS — I48.0 PAF (PAROXYSMAL ATRIAL FIBRILLATION) (HCC): Primary | ICD-10-CM

## 2023-08-25 ENCOUNTER — HOSPITAL ENCOUNTER (OUTPATIENT)
Facility: HOSPITAL | Age: 63
Discharge: HOME | End: 2023-08-25
Attending: INTERNAL MEDICINE
Payer: MEDICARE

## 2023-08-25 DIAGNOSIS — I48.0 PAROXYSMAL ATRIAL FIBRILLATION (HCC): ICD-10-CM

## 2023-08-25 LAB
ECHO AO ROOT DIAM: 3.5 CM
ECHO AV PEAK GRADIENT: 9 MMHG
ECHO AV PEAK VELOCITY: 1.5 M/S
ECHO EST RA PRESSURE: 3 MMHG
ECHO LA DIAMETER: 3.8 CM
ECHO LA TO AORTIC ROOT RATIO: 1.09
ECHO LA VOL 2C: 68 ML (ref 18–58)
ECHO LA VOL 2C: 72 ML (ref 18–58)
ECHO LA VOL 4C: 44 ML (ref 18–58)
ECHO LA VOL 4C: 49 ML (ref 18–58)
ECHO LA VOLUME AREA LENGTH: 63 ML
ECHO LV E' LATERAL VELOCITY: 9 CM/S
ECHO LV E' SEPTAL VELOCITY: 9 CM/S
ECHO LV FRACTIONAL SHORTENING: 45 % (ref 28–44)
ECHO LV INTERNAL DIMENSION DIASTOLIC: 6 CM (ref 4.2–5.9)
ECHO LV INTERNAL DIMENSION SYSTOLIC: 3.3 CM
ECHO LV IVSD: 1 CM (ref 0.6–1)
ECHO LV MASS 2D: 231.1 G (ref 88–224)
ECHO LV POSTERIOR WALL DIASTOLIC: 0.9 CM (ref 0.6–1)
ECHO LV RELATIVE WALL THICKNESS RATIO: 0.3
ECHO LVOT AREA: 3.8 CM2
ECHO LVOT DIAM: 2.2 CM
ECHO MV A VELOCITY: 0.7 M/S
ECHO MV E DECELERATION TIME (DT): 225.8 MS
ECHO MV E VELOCITY: 0.62 M/S
ECHO MV E/A RATIO: 0.89
ECHO MV E/E' LATERAL: 6.89
ECHO MV E/E' RATIO (AVERAGED): 6.89
ECHO MV E/E' SEPTAL: 6.89
ECHO PULMONARY ARTERY SYSTOLIC PRESSURE (PASP): 30 MMHG
ECHO RA AREA 4C: 19.6 CM2
ECHO RIGHT VENTRICULAR SYSTOLIC PRESSURE (RVSP): 30 MMHG
ECHO RV TAPSE: 2.6 CM (ref 1.7–?)
ECHO TV REGURGITANT MAX VELOCITY: 2.58 M/S
ECHO TV REGURGITANT PEAK GRADIENT: 27 MMHG

## 2023-08-25 PROCEDURE — 93306 TTE W/DOPPLER COMPLETE: CPT

## 2023-10-16 ENCOUNTER — HOSPITAL ENCOUNTER (OUTPATIENT)
Facility: HOSPITAL | Age: 63
Discharge: HOME | End: 2023-10-19

## 2023-10-17 LAB
25(OH)D3 SERPL-MCNC: 38 NG/ML (ref 30–100)
PSA SERPL-MCNC: 3.5 NG/ML (ref 0.01–4)

## 2023-10-19 ENCOUNTER — OFFICE VISIT (OUTPATIENT)
Age: 63
End: 2023-10-19
Payer: MEDICARE

## 2023-10-19 VITALS
WEIGHT: 198 LBS | HEART RATE: 61 BPM | DIASTOLIC BLOOD PRESSURE: 78 MMHG | BODY MASS INDEX: 26.82 KG/M2 | SYSTOLIC BLOOD PRESSURE: 120 MMHG | RESPIRATION RATE: 20 BRPM | HEIGHT: 72 IN | TEMPERATURE: 98.4 F | OXYGEN SATURATION: 98 %

## 2023-10-19 DIAGNOSIS — I48.0 PAF (PAROXYSMAL ATRIAL FIBRILLATION) (HCC): Primary | ICD-10-CM

## 2023-10-19 DIAGNOSIS — I49.3 PVCS (PREMATURE VENTRICULAR CONTRACTIONS): ICD-10-CM

## 2023-10-19 DIAGNOSIS — R00.2 PALPITATIONS: ICD-10-CM

## 2023-10-19 PROCEDURE — 93000 ELECTROCARDIOGRAM COMPLETE: CPT | Performed by: INTERNAL MEDICINE

## 2023-10-19 PROCEDURE — 1123F ACP DISCUSS/DSCN MKR DOCD: CPT | Performed by: INTERNAL MEDICINE

## 2023-10-19 PROCEDURE — 99214 OFFICE O/P EST MOD 30 MIN: CPT | Performed by: INTERNAL MEDICINE

## 2023-10-19 RX ORDER — ASPIRIN 81 MG/1
81 TABLET ORAL DAILY
COMMUNITY

## 2023-10-19 ASSESSMENT — PATIENT HEALTH QUESTIONNAIRE - PHQ9
1. LITTLE INTEREST OR PLEASURE IN DOING THINGS: 0
SUM OF ALL RESPONSES TO PHQ QUESTIONS 1-9: 0
SUM OF ALL RESPONSES TO PHQ QUESTIONS 1-9: 0
2. FEELING DOWN, DEPRESSED OR HOPELESS: 0
SUM OF ALL RESPONSES TO PHQ QUESTIONS 1-9: 0
SUM OF ALL RESPONSES TO PHQ QUESTIONS 1-9: 0
SUM OF ALL RESPONSES TO PHQ9 QUESTIONS 1 & 2: 0

## 2023-11-13 ENCOUNTER — TELEPHONE (OUTPATIENT)
Age: 63
End: 2023-11-13

## 2023-11-13 ENCOUNTER — ANESTHESIA (OUTPATIENT)
Facility: HOSPITAL | Age: 63
End: 2023-11-13
Payer: MEDICARE

## 2023-11-13 ENCOUNTER — OFFICE VISIT (OUTPATIENT)
Age: 63
End: 2023-11-13
Payer: MEDICARE

## 2023-11-13 ENCOUNTER — HOSPITAL ENCOUNTER (OUTPATIENT)
Facility: HOSPITAL | Age: 63
Discharge: HOME | End: 2023-11-13
Attending: INTERNAL MEDICINE | Admitting: INTERNAL MEDICINE
Payer: MEDICARE

## 2023-11-13 ENCOUNTER — ANESTHESIA EVENT (OUTPATIENT)
Facility: HOSPITAL | Age: 63
End: 2023-11-13
Payer: MEDICARE

## 2023-11-13 ENCOUNTER — CLINICAL DOCUMENTATION (OUTPATIENT)
Age: 63
End: 2023-11-13

## 2023-11-13 VITALS
RESPIRATION RATE: 14 BRPM | WEIGHT: 195 LBS | OXYGEN SATURATION: 97 % | HEIGHT: 72 IN | DIASTOLIC BLOOD PRESSURE: 72 MMHG | HEART RATE: 63 BPM | SYSTOLIC BLOOD PRESSURE: 100 MMHG | TEMPERATURE: 97.9 F | BODY MASS INDEX: 26.41 KG/M2

## 2023-11-13 VITALS
WEIGHT: 197 LBS | TEMPERATURE: 98.5 F | HEIGHT: 72 IN | RESPIRATION RATE: 20 BRPM | OXYGEN SATURATION: 98 % | DIASTOLIC BLOOD PRESSURE: 80 MMHG | BODY MASS INDEX: 26.68 KG/M2 | HEART RATE: 136 BPM | SYSTOLIC BLOOD PRESSURE: 104 MMHG

## 2023-11-13 DIAGNOSIS — I48.3 TYPICAL ATRIAL FLUTTER (HCC): Primary | ICD-10-CM

## 2023-11-13 DIAGNOSIS — I48.91 A-FIB (HCC): ICD-10-CM

## 2023-11-13 DIAGNOSIS — I48.0 PAF (PAROXYSMAL ATRIAL FIBRILLATION) (HCC): ICD-10-CM

## 2023-11-13 DIAGNOSIS — I49.3 PVCS (PREMATURE VENTRICULAR CONTRACTIONS): ICD-10-CM

## 2023-11-13 PROBLEM — D68.69 SECONDARY HYPERCOAGULABLE STATE (HCC): Status: RESOLVED | Noted: 2023-11-13 | Resolved: 2023-11-13

## 2023-11-13 PROBLEM — D68.69 SECONDARY HYPERCOAGULABLE STATE (HCC): Status: ACTIVE | Noted: 2023-11-13

## 2023-11-13 LAB
ANION GAP SERPL CALC-SCNC: 3 MMOL/L (ref 5–15)
BASOPHILS # BLD: 0.1 K/UL (ref 0–0.1)
BASOPHILS NFR BLD: 1 % (ref 0–1)
BUN SERPL-MCNC: 17 MG/DL (ref 6–20)
BUN/CREAT SERPL: 16 (ref 12–20)
CALCIUM SERPL-MCNC: 8.8 MG/DL (ref 8.5–10.1)
CHLORIDE SERPL-SCNC: 104 MMOL/L (ref 97–108)
CO2 SERPL-SCNC: 30 MMOL/L (ref 21–32)
CREAT SERPL-MCNC: 1.08 MG/DL (ref 0.7–1.3)
DIFFERENTIAL METHOD BLD: ABNORMAL
ECHO BSA: 2.12 M2
EKG ATRIAL RATE: 72 BPM
EKG DIAGNOSIS: NORMAL
EKG P AXIS: 71 DEGREES
EKG P-R INTERVAL: 156 MS
EKG Q-T INTERVAL: 398 MS
EKG QRS DURATION: 96 MS
EKG QTC CALCULATION (BAZETT): 435 MS
EKG R AXIS: -68 DEGREES
EKG T AXIS: 12 DEGREES
EKG VENTRICULAR RATE: 72 BPM
EOSINOPHIL # BLD: 0 K/UL (ref 0–0.4)
EOSINOPHIL NFR BLD: 0 % (ref 0–7)
ERYTHROCYTE [DISTWIDTH] IN BLOOD BY AUTOMATED COUNT: 11.7 % (ref 11.5–14.5)
GLUCOSE SERPL-MCNC: 110 MG/DL (ref 65–100)
HCT VFR BLD AUTO: 50.5 % (ref 36.6–50.3)
HGB BLD-MCNC: 17.2 G/DL (ref 12.1–17)
IMM GRANULOCYTES # BLD AUTO: 0 K/UL (ref 0–0.04)
IMM GRANULOCYTES NFR BLD AUTO: 1 % (ref 0–0.5)
LYMPHOCYTES # BLD: 2.7 K/UL (ref 0.8–3.5)
LYMPHOCYTES NFR BLD: 30 % (ref 12–49)
MCH RBC QN AUTO: 31.7 PG (ref 26–34)
MCHC RBC AUTO-ENTMCNC: 34.1 G/DL (ref 30–36.5)
MCV RBC AUTO: 93 FL (ref 80–99)
MONOCYTES # BLD: 0.7 K/UL (ref 0–1)
MONOCYTES NFR BLD: 8 % (ref 5–13)
NEUTS SEG # BLD: 5.3 K/UL (ref 1.8–8)
NEUTS SEG NFR BLD: 60 % (ref 32–75)
NRBC # BLD: 0 K/UL (ref 0–0.01)
NRBC BLD-RTO: 0 PER 100 WBC
PLATELET # BLD AUTO: 226 K/UL (ref 150–400)
PMV BLD AUTO: 9.7 FL (ref 8.9–12.9)
POTASSIUM SERPL-SCNC: 4.3 MMOL/L (ref 3.5–5.1)
RBC # BLD AUTO: 5.43 M/UL (ref 4.1–5.7)
SODIUM SERPL-SCNC: 137 MMOL/L (ref 136–145)
WBC # BLD AUTO: 8.8 K/UL (ref 4.1–11.1)

## 2023-11-13 PROCEDURE — 1036F TOBACCO NON-USER: CPT | Performed by: INTERNAL MEDICINE

## 2023-11-13 PROCEDURE — 36415 COLL VENOUS BLD VENIPUNCTURE: CPT

## 2023-11-13 PROCEDURE — 1123F ACP DISCUSS/DSCN MKR DOCD: CPT | Performed by: INTERNAL MEDICINE

## 2023-11-13 PROCEDURE — G8419 CALC BMI OUT NRM PARAM NOF/U: HCPCS | Performed by: INTERNAL MEDICINE

## 2023-11-13 PROCEDURE — 92960 CARDIOVERSION ELECTRIC EXT: CPT | Performed by: SPECIALIST

## 2023-11-13 PROCEDURE — 3017F COLORECTAL CA SCREEN DOC REV: CPT | Performed by: INTERNAL MEDICINE

## 2023-11-13 PROCEDURE — 2709999900 HC NON-CHARGEABLE SUPPLY: Performed by: SPECIALIST

## 2023-11-13 PROCEDURE — 3700000001 HC ADD 15 MINUTES (ANESTHESIA): Performed by: SPECIALIST

## 2023-11-13 PROCEDURE — 3700000000 HC ANESTHESIA ATTENDED CARE: Performed by: SPECIALIST

## 2023-11-13 PROCEDURE — 6360000002 HC RX W HCPCS

## 2023-11-13 PROCEDURE — G8484 FLU IMMUNIZE NO ADMIN: HCPCS | Performed by: INTERNAL MEDICINE

## 2023-11-13 PROCEDURE — 80048 BASIC METABOLIC PNL TOTAL CA: CPT

## 2023-11-13 PROCEDURE — 93000 ELECTROCARDIOGRAM COMPLETE: CPT | Performed by: INTERNAL MEDICINE

## 2023-11-13 PROCEDURE — 99214 OFFICE O/P EST MOD 30 MIN: CPT | Performed by: INTERNAL MEDICINE

## 2023-11-13 PROCEDURE — 93312 ECHO TRANSESOPHAGEAL: CPT

## 2023-11-13 PROCEDURE — 93312 ECHO TRANSESOPHAGEAL: CPT | Performed by: SPECIALIST

## 2023-11-13 PROCEDURE — 2580000003 HC RX 258

## 2023-11-13 PROCEDURE — G8428 CUR MEDS NOT DOCUMENT: HCPCS | Performed by: INTERNAL MEDICINE

## 2023-11-13 PROCEDURE — 2500000003 HC RX 250 WO HCPCS

## 2023-11-13 PROCEDURE — 85025 COMPLETE CBC W/AUTO DIFF WBC: CPT

## 2023-11-13 RX ORDER — ASPIRIN 325 MG
325 TABLET ORAL DAILY
Status: ON HOLD | COMMUNITY
End: 2023-11-13 | Stop reason: HOSPADM

## 2023-11-13 RX ORDER — FLECAINIDE ACETATE 100 MG/1
100 TABLET ORAL EVERY 12 HOURS SCHEDULED
Qty: 60 TABLET | Refills: 3 | Status: SHIPPED | OUTPATIENT
Start: 2023-11-13

## 2023-11-13 RX ORDER — LIDOCAINE HYDROCHLORIDE 20 MG/ML
INJECTION, SOLUTION EPIDURAL; INFILTRATION; INTRACAUDAL; PERINEURAL PRN
Status: DISCONTINUED | OUTPATIENT
Start: 2023-11-13 | End: 2023-11-13 | Stop reason: SDUPTHER

## 2023-11-13 RX ORDER — FLECAINIDE ACETATE 100 MG/1
100 TABLET ORAL EVERY 12 HOURS SCHEDULED
Status: DISCONTINUED | OUTPATIENT
Start: 2023-11-13 | End: 2023-11-13 | Stop reason: HOSPADM

## 2023-11-13 RX ORDER — SODIUM CHLORIDE 9 MG/ML
INJECTION, SOLUTION INTRAVENOUS CONTINUOUS PRN
Status: DISCONTINUED | OUTPATIENT
Start: 2023-11-13 | End: 2023-11-13 | Stop reason: SDUPTHER

## 2023-11-13 RX ORDER — METOPROLOL TARTRATE 50 MG/1
50 TABLET, FILM COATED ORAL 2 TIMES DAILY PRN
COMMUNITY

## 2023-11-13 RX ADMIN — PROPOFOL 50 MG: 10 INJECTION, EMULSION INTRAVENOUS at 15:55

## 2023-11-13 RX ADMIN — PROPOFOL 50 MG: 10 INJECTION, EMULSION INTRAVENOUS at 15:53

## 2023-11-13 RX ADMIN — PROPOFOL 50 MG: 10 INJECTION, EMULSION INTRAVENOUS at 15:51

## 2023-11-13 RX ADMIN — SODIUM CHLORIDE: 9 INJECTION, SOLUTION INTRAVENOUS at 15:43

## 2023-11-13 RX ADMIN — LIDOCAINE HYDROCHLORIDE 100 MG: 20 INJECTION, SOLUTION EPIDURAL; INFILTRATION; INTRACAUDAL; PERINEURAL at 15:49

## 2023-11-13 RX ADMIN — PROPOFOL 25 MG: 10 INJECTION, EMULSION INTRAVENOUS at 15:58

## 2023-11-13 RX ADMIN — PROPOFOL 75 MG: 10 INJECTION, EMULSION INTRAVENOUS at 15:49

## 2023-11-13 NOTE — PROGRESS NOTES
Cardiac Cath Lab Procedure Area Arrival Note:    Rebecca Arellano arrived to Cardiac Cath Lab, Procedure Area. Patient identifiers verified with NAME and DATE OF BIRTH. Procedure verified with patient. Consent forms verified. Allergies verified. Patient informed of procedure and plan of care. Questions answered with review. Patient voiced understanding of procedure and plan of care. Patient on cardiac monitor, non-invasive blood pressure, SPO2 monitor. Patient status doing well without problems. Patient is A&Ox 4. Patient reports no complaints. Patient medicated during procedure with orders obtained and verified by Dr. Marla Parikh.     Refer to anesthesia records for vital signs and medication administration

## 2023-11-13 NOTE — PROGRESS NOTES
Per Dr. Dowell Ends: elective cardioversion today, Nov 11 at [facility]. Information given to , Yvette Lozada (all information has been discussed/approved with MD):    CPT codes: 38665  ICD-10 codes: I48.0  DOCTOR: Virgie Hines  Urgency (ASAP or routine):   Length of procedure (slot): per MD   Time frame: per MD  Special instructions (medications, overnight stay): NONE    Address and instructions given to the patient and spouse. Questions answered. Patient verbalized understanding.

## 2023-11-13 NOTE — TELEPHONE ENCOUNTER
PT called in stating that he went into afib Sunday morning. Pt states that he was informed by Dr. Jonathon Wood to call if he was in afib for >24 hrs. Pt took is scheduled dose of metoprolol xl 50 mg last night and a prn metoprolol tartrate 50 mg. Pts HR this  bpm. Pt took prn metoprolol tartrate 50 mg and HR now 100 bpm. Pt states that his only complaint is \"fatigue\". Pt coming into the office this AM for ekg. APPT with MABLE Parada this week.

## 2023-11-13 NOTE — TELEPHONE ENCOUNTER
Pt is calling because he is in Afib and the doctor told him that he needs to be converted and he would like to know if he needs to come to the office or go to the hospital.    [phone]

## 2023-11-13 NOTE — TELEPHONE ENCOUNTER
----- Message from Luana Sandra LPN sent at 12/81/4754 10:15 AM EST -----  Regarding: CV  CPT codes: 78992  ICD-10 codes: I48.0  DOCTOR: Arlen Granados  Urgency (ASAP or routine):   Length of procedure (slot): per MD   Time frame: per MD  Special instructions (medications, overnight stay): NONE      Per Dr. Bucky Lombard - today at 2pm

## 2023-11-13 NOTE — PROGRESS NOTES
1705: Ambulated patient to the bathroom with a steady gait, voided without difficulty. Patient denies chest pain, shortness of breath, or dizziness. Returned to [facility]. Vital signs stable. Patient dressed self. Educated patient about their sedation precautions such as not driving, operating any machinery, or signing legal documents 24 hours post procedure. Reviewed discharge instructions, including medications and site care using the teach back method. New Rx sent to pharmacy for . Answered all questions. Verbalized understanding. Removed peripheral IV. Escorted to discharge area in a wheelchair with all of their belongings including cell phone, eyeglasses. Patient's spouse present to take patient home. Reviewed discharge instructions with patients' spouse, they verbalized understanding.

## 2023-11-13 NOTE — PATIENT INSTRUCTIONS
Begin Eliquis 5 mg twice daily. Take your first days now. Discontinue aspirin. Please go to [facility] for URVASHI guided electrical cardioversion this afternoon. Dr. Abi Triplett is my partner who will be doing the procedure. Please arrive at 2 PM at the [address] [facility],[address] entrance ground level. Please do not eat anything.

## 2023-11-13 NOTE — PROGRESS NOTES
Cardiac Cath Lab Recovery Arrival Note:      Bria Pate arrived to Cardiac Cath Lab, Recovery Area. Staff introduced to patient. Patient identifiers verified with NAME and DATE OF BIRTH. Procedure verified with patient. Consent forms reviewed and signed by patient or authorized representative and verified. Allergies verified. Patient informed of procedure and plan of care. Questions answered with review. Patient prepped for procedure, per orders from physician, prior to arrival.    Patient on cardiac monitor, non-invasive blood pressure, SPO2 monitor. Patient is A&Ox 4. Patient reports no complaints. Patient in stretcher, in low position, with side rails up, call bell within reach, patient instructed to call of assistance as needed. Patient prep in: [facility] Recovery Area, Bed# FT. Family in: waiting room. Prep by: DAVID Drake

## 2023-11-13 NOTE — ANESTHESIA POSTPROCEDURE EVALUATION
Department of Anesthesiology  Postprocedure Note    Patient: Lenka Mao  MRN: [id number]  YOB: 1960  Date of evaluation: 11/13/2023      Procedure Summary     Date: 11/13/23 Room / Location: [address] [place] [address]    Anesthesia Start: 7960 Anesthesia Stop: 8816    Procedure: URVASHI W/ POSSIBLE CARDIOVERSION (PRN CONTRAST/BUBBLE/3D) Diagnosis:       A-fib ([address])      A-fib ([address])    Scheduled Providers: Wilhemenia Galeazzi, MD Responsible Provider: Wilhemenia Galeazzi, MD    Anesthesia Type: MAC ASA Status: 2          Anesthesia Type: No value filed.     Inocencia Phase I: Inocencia Score: 10    Inocencia Phase II:        Anesthesia Post Evaluation    Patient location during evaluation: PACU  Patient participation: complete - patient participated  Level of consciousness: awake  Pain score: 0  Airway patency: patent  Nausea & Vomiting: no nausea  Complications: no  Cardiovascular status: blood pressure returned to baseline and hemodynamically stable  Respiratory status: acceptable  Hydration status: stable  Pain management: adequate and satisfactory to patient

## 2023-11-13 NOTE — TELEPHONE ENCOUNTER
Patient seen in office today and scheduled for CV with Dr. Rob Marks 11/13/23 @ 2:00 pm . Danielle gave patient instructions in office. Patient Instructions    URVASHI (Transesophageal Echocardiogram)  Cardioversion  Pre-procedure instructions  The night before the procedure nothing to eat or drink after midnight, you may take approved medications the morning of the procedure with a few sips of water. Medication restrictions: No medications to hold. Labs: None needed.     Procedure day  Have a  that will bring you and take you home  Have a responsible adult that can stay with you for 24hrs  Bring ID and insurance card  Wear comfortable clothing  Leave valuables at home, bring: dentures, hearing aids, or glasses  Bring overnight bag (just in case of an overnight stay)  Where to report  [facility]: go through main entrance and to the left is outpatient registration    Date of procedure: 11/13/23 w/ Dr. Valeria Tong Time: 1:30 pm    Post procedure instructions  No driving for 24 hours    [address] office: [phone]

## 2023-11-29 ENCOUNTER — OFFICE VISIT (OUTPATIENT)
Dept: FAMILY MEDICINE CLINIC | Age: 63
End: 2023-11-29
Payer: MEDICARE

## 2023-11-29 VITALS
SYSTOLIC BLOOD PRESSURE: 117 MMHG | RESPIRATION RATE: 16 BRPM | HEIGHT: 72 IN | OXYGEN SATURATION: 98 % | HEART RATE: 64 BPM | DIASTOLIC BLOOD PRESSURE: 70 MMHG | BODY MASS INDEX: 27.28 KG/M2 | TEMPERATURE: 98.3 F | WEIGHT: 201.4 LBS

## 2023-11-29 DIAGNOSIS — I48.0 PAROXYSMAL ATRIAL FIBRILLATION (HCC): ICD-10-CM

## 2023-11-29 DIAGNOSIS — M79.10 MYALGIA: ICD-10-CM

## 2023-11-29 DIAGNOSIS — D68.69 SECONDARY HYPERCOAGULABLE STATE (HCC): ICD-10-CM

## 2023-11-29 DIAGNOSIS — R68.89 FLU-LIKE SYMPTOMS: Primary | ICD-10-CM

## 2023-11-29 LAB
CK SERPL-CCNC: 99 U/L (ref 39–308)
ERYTHROCYTE [SEDIMENTATION RATE] IN BLOOD: 5 MM/HR (ref 0–20)
INFLUENZA A ANTIGEN, POC: NEGATIVE
INFLUENZA B ANTIGEN, POC: NEGATIVE
TSH SERPL DL<=0.05 MIU/L-ACNC: 2.58 UIU/ML (ref 0.36–3.74)
VALID INTERNAL CONTROL, POC: YES

## 2023-11-29 PROCEDURE — 87502 INFLUENZA DNA AMP PROBE: CPT | Performed by: FAMILY MEDICINE

## 2023-11-29 PROCEDURE — 1123F ACP DISCUSS/DSCN MKR DOCD: CPT | Performed by: FAMILY MEDICINE

## 2023-11-29 PROCEDURE — G8428 CUR MEDS NOT DOCUMENT: HCPCS | Performed by: FAMILY MEDICINE

## 2023-11-29 PROCEDURE — 3017F COLORECTAL CA SCREEN DOC REV: CPT | Performed by: FAMILY MEDICINE

## 2023-11-29 PROCEDURE — G8484 FLU IMMUNIZE NO ADMIN: HCPCS | Performed by: FAMILY MEDICINE

## 2023-11-29 PROCEDURE — 1036F TOBACCO NON-USER: CPT | Performed by: FAMILY MEDICINE

## 2023-11-29 PROCEDURE — 99214 OFFICE O/P EST MOD 30 MIN: CPT | Performed by: FAMILY MEDICINE

## 2023-11-29 PROCEDURE — G8419 CALC BMI OUT NRM PARAM NOF/U: HCPCS | Performed by: FAMILY MEDICINE

## 2023-11-29 ASSESSMENT — PATIENT HEALTH QUESTIONNAIRE - PHQ9
SUM OF ALL RESPONSES TO PHQ QUESTIONS 1-9: 0
SUM OF ALL RESPONSES TO PHQ QUESTIONS 1-9: 0
SUM OF ALL RESPONSES TO PHQ9 QUESTIONS 1 & 2: 0
SUM OF ALL RESPONSES TO PHQ QUESTIONS 1-9: 0
2. FEELING DOWN, DEPRESSED OR HOPELESS: 0
1. LITTLE INTEREST OR PLEASURE IN DOING THINGS: 0
SUM OF ALL RESPONSES TO PHQ QUESTIONS 1-9: 0

## 2023-11-29 NOTE — PROGRESS NOTES
Successful venipuncture in patient's right ac X 1 sticks. The patient tolerated this procedure w/o c/o pain or discomfort.
cooperative, no distress. HEENT:  Normocephalic, without obvious abnormality, atraumatic. Conjunctivae/corneas clear. Pupils equal, round, reactive to light. Extraocular movements intact. TMs and external canals normal bilaterally. Nasal mucosa and oropharynx clear. Lungs: Clear to auscultation bilaterally. Chest wall:  No tenderness or deformity. Heart:  Regular rate and rhythm, S1, S2 normal, no murmur, click, rub, or gallop. Abdomen:   Soft, non-tender. Bowel sounds normal. No masses. No organomegaly. Extremities: Extremities normal, atraumatic, no cyanosis or edema. Pulses: 2+ and symmetric all extremities. Skin: Skin color, texture, turgor normal. No rashes or lesions. Lymph nodes: Cervical, supraclavicular, and axillary nodes normal.   Neurologic: CNII-XII intact. Normal strength, sensation, and reflexes throughout. Perceptible resting tremor of R thumb. No cogwheeling or rigidity. Gait normal. Rises from chair without difficulty     Results for orders placed or performed in visit on 11/29/23   AMB POC INFLUENZA A  AND B REAL-TIME RT-PCR   Result Value Ref Range    Valid Internal Control, POC yes     Influenza A Antigen, POC Negative Negative    Influenza B Antigen, POC Negative Negative           ASSESSMENT AND PLAN    1. Flu-like symptoms    - AMB POC INFLUENZA A  AND B REAL-TIME RT-PCR  - Babesia microti Abs, IgG and IgM; Future  - Babesia microti Abs, IgG and IgM  - Borrelia Burgdorferi (Lymes) Antibodies IgG & IgM Western Blot CSF  - Lyme Disease Total Antibody With Reflex to Immunoassay; Future  - Lyme Disease Total Antibody With Reflex to Immunoassay    2. Myalgia  Rule out PMR, primary myopathy  - Babesia microti Abs, IgG and IgM; Future  - CBC with Auto Differential; Future  - Sedimentation Rate; Future  - CK;  Future  - CK  - CBC with Auto Differential  - Babesia microti Abs, IgG and IgM  - Borrelia Burgdorferi (Lymes) Antibodies IgG & IgM Western Blot CSF  - Sedimentation Rate  - Lyme

## 2023-11-30 LAB
BASOPHILS # BLD: 0 K/UL (ref 0–0.1)
BASOPHILS NFR BLD: 1 % (ref 0–1)
DIFFERENTIAL METHOD BLD: NORMAL
EOSINOPHIL # BLD: 0 K/UL (ref 0–0.4)
EOSINOPHIL NFR BLD: 1 % (ref 0–7)
ERYTHROCYTE [DISTWIDTH] IN BLOOD BY AUTOMATED COUNT: 11.5 % (ref 11.5–14.5)
HCT VFR BLD AUTO: 44.9 % (ref 36.6–50.3)
HGB BLD-MCNC: 15.2 G/DL (ref 12.1–17)
IMM GRANULOCYTES # BLD AUTO: 0 K/UL (ref 0–0.04)
IMM GRANULOCYTES NFR BLD AUTO: 0 % (ref 0–0.5)
LYMPHOCYTES # BLD: 1 K/UL (ref 0.8–3.5)
LYMPHOCYTES NFR BLD: 21 % (ref 12–49)
MCH RBC QN AUTO: 31.6 PG (ref 26–34)
MCHC RBC AUTO-ENTMCNC: 33.9 G/DL (ref 30–36.5)
MCV RBC AUTO: 93.3 FL (ref 80–99)
MONOCYTES # BLD: 0.4 K/UL (ref 0–1)
MONOCYTES NFR BLD: 8 % (ref 5–13)
NEUTS SEG # BLD: 3.2 K/UL (ref 1.8–8)
NEUTS SEG NFR BLD: 69 % (ref 32–75)
NRBC # BLD: 0 K/UL (ref 0–0.01)
NRBC BLD-RTO: 0 PER 100 WBC
PLATELET # BLD AUTO: 185 K/UL (ref 150–400)
PMV BLD AUTO: 11 FL (ref 8.9–12.9)
RBC # BLD AUTO: 4.81 M/UL (ref 4.1–5.7)
WBC # BLD AUTO: 4.7 K/UL (ref 4.1–11.1)

## 2023-12-04 LAB
COMMENT: NORMAL
LYME ANTIBODY: NEGATIVE

## 2023-12-28 ENCOUNTER — TELEPHONE (OUTPATIENT)
Dept: FAMILY MEDICINE CLINIC | Age: 63
End: 2023-12-28

## 2023-12-28 NOTE — TELEPHONE ENCOUNTER
----- Message from Roxana Tomasz sent at 2023 12:31 PM EST -----  Regarding: Lab Specimen Problem  Hello,    Please see information below for details regarding a problem with samples received at [address] [place] [address] Laboratory:    Patient Name: Maria Luisa Blount  Patient : 1960  Test(s) affected: B microti Abs  Description of Problem: Specimen rejected by reference laboratory due to unavailability of reagents or discontinuation of test.    Please place a new order and Client Services will contact the patient for recollection. If you have any questions, please call [phone] and a representative will assist you.     Thank you,    [phone]  Laboratory Client Services

## 2023-12-28 NOTE — TELEPHONE ENCOUNTER
I have discussed this issue with Dr Zamzam Salter. They patient is now feeling better and so it is not necessary to repeat the testing.

## 2023-12-29 ENCOUNTER — NURSE TRIAGE (OUTPATIENT)
Dept: OTHER | Facility: CLINIC | Age: 63
End: 2023-12-29

## 2023-12-29 NOTE — TELEPHONE ENCOUNTER
[facility]    Received call from Jim Fernández at [facility] with Red Flag Complaint. Subjective: Caller states left 3rd toe swelling    Current Symptoms:   -painful, swelling, red, mild bruising to left 3rd toe  -denies known injury    Onset: 3 weeks ago; waxing and waning    Pain Severity: 2/10; Temperature: denies fevers     What has been tried:   -Keflex, self prescribed    Recommended disposition: See in Office Within 2 Weeks    Care advice provided, patient verbalizes understanding; denies any other questions or concerns; instructed to call back for any new or worsening symptoms. Patient/Caller agrees with recommended disposition; writer provided warm transfer to Christopher Alberts at [facility] for appointment scheduling    Attention Provider: Thank you for allowing me to participate in the care of your patient. The patient was connected to triage in response to information provided to the ECC/PSC. Please do not respond through this encounter as the response is not directed to a shared pool. Reason for Disposition   MILD pain (e.g., does not interfere with normal activities) and present > 7 days    Protocols used:  Toe Pain-ADULT-[place]

## 2024-01-31 DIAGNOSIS — I48.0 PAROXYSMAL ATRIAL FIBRILLATION (HCC): ICD-10-CM

## 2024-01-31 DIAGNOSIS — M54.31 SCIATICA, RIGHT SIDE: Primary | ICD-10-CM

## 2024-01-31 RX ORDER — METOPROLOL SUCCINATE 100 MG/1
TABLET, EXTENDED RELEASE ORAL
Qty: 90 TABLET | Refills: 1 | Status: SHIPPED | OUTPATIENT
Start: 2024-01-31

## 2024-01-31 RX ORDER — MELOXICAM 15 MG/1
15 TABLET ORAL PRN
Qty: 90 TABLET | Refills: 3 | Status: SHIPPED | OUTPATIENT
Start: 2024-01-31

## 2024-06-18 NOTE — TELEPHONE ENCOUNTER
----- Message from Chemo Mclaughlin sent at 6/18/2024  3:05 PM EDT -----  Regarding: Refill albuterol   Contact: [phone]  Would it be possible to have a renewal prescription for my albuterol MDI sent to my mail order [facility] pharmacy? I use it infrequently but like to have one on hand if needed. I am traveling a lot right now but plan to schedule my routine visit later this summer. Thanks

## 2024-06-19 RX ORDER — ALBUTEROL SULFATE 90 UG/1
2 AEROSOL, METERED RESPIRATORY (INHALATION) EVERY 6 HOURS PRN
Qty: 3 EACH | Refills: 1 | Status: SHIPPED | OUTPATIENT
Start: 2024-06-19

## 2024-07-12 NOTE — PROGRESS NOTES
ASSESSMENT and PLAN  1. Typical atrial flutter (HCC)  No recurrence s/p PVI and CTI ablation 12/12/2023.    2. PAF (paroxysmal atrial fibrillation) (HCC)  No symptoms of recurrence s/p PVI and CTI ablation 12/12/2023.    3. PVCs (premature ventricular contractions)  Low burden (<1%) on monitor 10/2023.  Continue beta-blocker.       Follow-up in 12 months..  The patient has been instructed and agrees to call our office with any issues or other concerns related to their cardiac condition(s) and/or complaint(s).      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  He underwent successful PVI and CTI ablation at [facility] on 12/12/2023.  He was doing well and in sinus rhythm at EP clinic follow-up on 3/21/2024.  Metoprolol succinate was continued for treatment of PVCs.  He and Dr. Parada had decided on a pill in pocket plan with eliquis, metoprolol and flecainide for recurrent atrial fibrillation (plan: with onset of AF, take Eliquis in 2-3 hours, then start metoprolol tartrate 50 mg and then flecainide 200 mg per day).  He denies palpitations, heart racing or other symptoms to suggest recurrence of atrial fibrillation.  He experiences an isolated palpitation infrequently.  He denies chest pain, dyspnea, heart failure symptoms or stroke/TIA symptoms.    PERTINENT CARDIAC HISTORY: (Records reviewed and summarized below)  Atrial fibrillation, paroxysmal  ==> New onset 2011  ==> ETT 5/3/2016, stage V Gerardo, no ischemia  ==> ETT 1/19/2018, 14.8 METS, no ischemia or arrhythmia  ==> Echo 6/2/2021, EF 65%, valves nl, RV nl, PASP 27  ==> Echo 8/25/2023, EF 70%, valves nl, RV mildly dilated, RVF nl, PASP 30  ==> s/p PVI and CTI ablation 12/12/2023, U  RBBB with LAFB  Hyperlipidemia  Vitamin D deficiency    Past medical history, past surgical history, family history, social history, and medications were all reviewed with

## 2024-07-22 ENCOUNTER — OFFICE VISIT (OUTPATIENT)
Age: 64
End: 2024-07-22
Payer: MEDICARE

## 2024-07-22 VITALS
SYSTOLIC BLOOD PRESSURE: 110 MMHG | HEART RATE: 60 BPM | RESPIRATION RATE: 20 BRPM | HEIGHT: 72 IN | WEIGHT: 204 LBS | TEMPERATURE: 98.5 F | OXYGEN SATURATION: 95 % | DIASTOLIC BLOOD PRESSURE: 80 MMHG | BODY MASS INDEX: 27.63 KG/M2

## 2024-07-22 DIAGNOSIS — I48.3 TYPICAL ATRIAL FLUTTER (HCC): Primary | ICD-10-CM

## 2024-07-22 DIAGNOSIS — I49.3 PVCS (PREMATURE VENTRICULAR CONTRACTIONS): ICD-10-CM

## 2024-07-22 DIAGNOSIS — I48.0 PAF (PAROXYSMAL ATRIAL FIBRILLATION) (HCC): ICD-10-CM

## 2024-07-22 PROCEDURE — 99214 OFFICE O/P EST MOD 30 MIN: CPT | Performed by: INTERNAL MEDICINE

## 2024-07-22 PROCEDURE — 1123F ACP DISCUSS/DSCN MKR DOCD: CPT | Performed by: INTERNAL MEDICINE

## 2024-07-22 PROCEDURE — 3017F COLORECTAL CA SCREEN DOC REV: CPT | Performed by: INTERNAL MEDICINE

## 2024-07-22 PROCEDURE — G8428 CUR MEDS NOT DOCUMENT: HCPCS | Performed by: INTERNAL MEDICINE

## 2024-07-22 PROCEDURE — 1036F TOBACCO NON-USER: CPT | Performed by: INTERNAL MEDICINE

## 2024-07-22 PROCEDURE — G8419 CALC BMI OUT NRM PARAM NOF/U: HCPCS | Performed by: INTERNAL MEDICINE

## 2024-07-22 RX ORDER — ACETAMINOPHEN 160 MG
TABLET,DISINTEGRATING ORAL
COMMUNITY

## 2024-07-22 NOTE — PROGRESS NOTES
Identified pt with two pt identifiers(name and ). Reviewed record in preparation for visit and have obtained necessary documentation.  No chief complaint on file.     /80 (Site: Left Upper Arm, Position: Sitting, Cuff Size: Medium Adult)   Pulse 60   Temp 98.5 °F (36.9 °C) (Temporal)   Resp 20   Ht 1.829 m (6')   Wt 92.5 kg (204 lb)   SpO2 95%   BMI 27.67 kg/m²       Medications reviewed/approved by provider.      Health Maintenance Review: Patient reminded of \"due or due soon\" health maintenance. I have asked the patient to contact his/her primary care provider (PCP) for follow-up on his/her health maintenance.    Coordination of Care Questionnaire:  :   1) Have you been to an emergency room, urgent care, or hospitalized since your last visit?  If yes, where when, and reason for visit? no       2. Have seen or consulted any other health care provider since your last visit?   If yes, where when, and reason for visit?  no      Patient is accompanied by SELF I have received verbal consent from Chemo Mclaughlin to discuss any/all medical information while they are present in the room.

## 2024-08-02 SDOH — HEALTH STABILITY: PHYSICAL HEALTH: ON AVERAGE, HOW MANY DAYS PER WEEK DO YOU ENGAGE IN MODERATE TO STRENUOUS EXERCISE (LIKE A BRISK WALK)?: 6 DAYS

## 2024-08-02 SDOH — ECONOMIC STABILITY: HOUSING INSECURITY
IN THE LAST 12 MONTHS, WAS THERE A TIME WHEN YOU DID NOT HAVE A STEADY PLACE TO SLEEP OR SLEPT IN A SHELTER (INCLUDING NOW)?: NO

## 2024-08-02 SDOH — HEALTH STABILITY: PHYSICAL HEALTH: ON AVERAGE, HOW MANY MINUTES DO YOU ENGAGE IN EXERCISE AT THIS LEVEL?: 60 MIN

## 2024-08-02 SDOH — ECONOMIC STABILITY: FOOD INSECURITY: WITHIN THE PAST 12 MONTHS, YOU WORRIED THAT YOUR FOOD WOULD RUN OUT BEFORE YOU GOT MONEY TO BUY MORE.: NEVER TRUE

## 2024-08-02 SDOH — ECONOMIC STABILITY: TRANSPORTATION INSECURITY
IN THE PAST 12 MONTHS, HAS LACK OF TRANSPORTATION KEPT YOU FROM MEETINGS, WORK, OR FROM GETTING THINGS NEEDED FOR DAILY LIVING?: NO

## 2024-08-02 SDOH — ECONOMIC STABILITY: FOOD INSECURITY: WITHIN THE PAST 12 MONTHS, THE FOOD YOU BOUGHT JUST DIDN'T LAST AND YOU DIDN'T HAVE MONEY TO GET MORE.: NEVER TRUE

## 2024-08-02 SDOH — ECONOMIC STABILITY: INCOME INSECURITY: HOW HARD IS IT FOR YOU TO PAY FOR THE VERY BASICS LIKE FOOD, HOUSING, MEDICAL CARE, AND HEATING?: NOT HARD AT ALL

## 2024-08-02 ASSESSMENT — PATIENT HEALTH QUESTIONNAIRE - PHQ9
SUM OF ALL RESPONSES TO PHQ QUESTIONS 1-9: 0
2. FEELING DOWN, DEPRESSED OR HOPELESS: NOT AT ALL
SUM OF ALL RESPONSES TO PHQ9 QUESTIONS 1 & 2: 0
1. LITTLE INTEREST OR PLEASURE IN DOING THINGS: NOT AT ALL
SUM OF ALL RESPONSES TO PHQ QUESTIONS 1-9: 0

## 2024-08-02 ASSESSMENT — LIFESTYLE VARIABLES
HOW OFTEN DURING THE LAST YEAR HAVE YOU FOUND THAT YOU WERE NOT ABLE TO STOP DRINKING ONCE YOU HAD STARTED: NEVER
HAS A RELATIVE, FRIEND, DOCTOR, OR ANOTHER HEALTH PROFESSIONAL EXPRESSED CONCERN ABOUT YOUR DRINKING OR SUGGESTED YOU CUT DOWN: NO
HOW OFTEN DURING THE LAST YEAR HAVE YOU BEEN UNABLE TO REMEMBER WHAT HAPPENED THE NIGHT BEFORE BECAUSE YOU HAD BEEN DRINKING: NEVER
HAS A RELATIVE, FRIEND, DOCTOR, OR ANOTHER HEALTH PROFESSIONAL EXPRESSED CONCERN ABOUT YOUR DRINKING OR SUGGESTED YOU CUT DOWN: NO
HOW OFTEN DO YOU HAVE A DRINK CONTAINING ALCOHOL: 5
HOW OFTEN DURING THE LAST YEAR HAVE YOU HAD A FEELING OF GUILT OR REMORSE AFTER DRINKING: NEVER
HOW OFTEN DURING THE LAST YEAR HAVE YOU NEEDED AN ALCOHOLIC DRINK FIRST THING IN THE MORNING TO GET YOURSELF GOING AFTER A NIGHT OF HEAVY DRINKING: NEVER
HOW OFTEN DURING THE LAST YEAR HAVE YOU FOUND THAT YOU WERE NOT ABLE TO STOP DRINKING ONCE YOU HAD STARTED: NEVER
HOW MANY STANDARD DRINKS CONTAINING ALCOHOL DO YOU HAVE ON A TYPICAL DAY: 1 OR 2
HOW MANY STANDARD DRINKS CONTAINING ALCOHOL DO YOU HAVE ON A TYPICAL DAY: 1
HAVE YOU OR SOMEONE ELSE BEEN INJURED AS A RESULT OF YOUR DRINKING: NO
HOW OFTEN DO YOU HAVE SIX OR MORE DRINKS ON ONE OCCASION: 1
HOW OFTEN DURING THE LAST YEAR HAVE YOU HAD A FEELING OF GUILT OR REMORSE AFTER DRINKING: NEVER
HAVE YOU OR SOMEONE ELSE BEEN INJURED AS A RESULT OF YOUR DRINKING: NO
HOW OFTEN DO YOU HAVE A DRINK CONTAINING ALCOHOL: 4 OR MORE TIMES A WEEK
HOW OFTEN DURING THE LAST YEAR HAVE YOU NEEDED AN ALCOHOLIC DRINK FIRST THING IN THE MORNING TO GET YOURSELF GOING AFTER A NIGHT OF HEAVY DRINKING: NEVER
HOW OFTEN DURING THE LAST YEAR HAVE YOU FAILED TO DO WHAT WAS NORMALLY EXPECTED FROM YOU BECAUSE OF DRINKING: NEVER
HOW OFTEN DURING THE LAST YEAR HAVE YOU BEEN UNABLE TO REMEMBER WHAT HAPPENED THE NIGHT BEFORE BECAUSE YOU HAD BEEN DRINKING: NEVER
HOW OFTEN DURING THE LAST YEAR HAVE YOU FAILED TO DO WHAT WAS NORMALLY EXPECTED FROM YOU BECAUSE OF DRINKING: NEVER

## 2024-08-03 PROBLEM — D68.69 SECONDARY HYPERCOAGULABLE STATE (HCC): Status: RESOLVED | Noted: 2023-11-29 | Resolved: 2024-08-03

## 2024-08-03 RX ORDER — FLECAINIDE ACETATE 100 MG/1
200 TABLET ORAL
COMMUNITY
Start: 2024-03-21

## 2024-08-05 ENCOUNTER — OFFICE VISIT (OUTPATIENT)
Age: 64
End: 2024-08-05
Payer: MEDICARE

## 2024-08-05 VITALS
SYSTOLIC BLOOD PRESSURE: 108 MMHG | BODY MASS INDEX: 27.44 KG/M2 | RESPIRATION RATE: 18 BRPM | WEIGHT: 202.6 LBS | HEIGHT: 72 IN | HEART RATE: 64 BPM | OXYGEN SATURATION: 96 % | DIASTOLIC BLOOD PRESSURE: 74 MMHG | TEMPERATURE: 97.6 F

## 2024-08-05 DIAGNOSIS — M25.519 NECK AND SHOULDER PAIN: ICD-10-CM

## 2024-08-05 DIAGNOSIS — Z12.5 SCREENING FOR PROSTATE CANCER: ICD-10-CM

## 2024-08-05 DIAGNOSIS — Z00.00 MEDICARE ANNUAL WELLNESS VISIT, SUBSEQUENT: Primary | ICD-10-CM

## 2024-08-05 DIAGNOSIS — M54.2 NECK AND SHOULDER PAIN: ICD-10-CM

## 2024-08-05 DIAGNOSIS — E78.2 MIXED HYPERLIPIDEMIA: ICD-10-CM

## 2024-08-05 DIAGNOSIS — E55.9 VITAMIN D DEFICIENCY: ICD-10-CM

## 2024-08-05 DIAGNOSIS — Z98.890 S/P LUMBAR LAMINECTOMY: ICD-10-CM

## 2024-08-05 DIAGNOSIS — I48.0 PAROXYSMAL ATRIAL FIBRILLATION (HCC): ICD-10-CM

## 2024-08-05 PROCEDURE — 99214 OFFICE O/P EST MOD 30 MIN: CPT | Performed by: INTERNAL MEDICINE

## 2024-08-05 PROCEDURE — G8419 CALC BMI OUT NRM PARAM NOF/U: HCPCS | Performed by: INTERNAL MEDICINE

## 2024-08-05 PROCEDURE — G8427 DOCREV CUR MEDS BY ELIG CLIN: HCPCS | Performed by: INTERNAL MEDICINE

## 2024-08-05 PROCEDURE — 3017F COLORECTAL CA SCREEN DOC REV: CPT | Performed by: INTERNAL MEDICINE

## 2024-08-05 PROCEDURE — G0439 PPPS, SUBSEQ VISIT: HCPCS | Performed by: INTERNAL MEDICINE

## 2024-08-05 PROCEDURE — 1036F TOBACCO NON-USER: CPT | Performed by: INTERNAL MEDICINE

## 2024-08-05 PROCEDURE — 1123F ACP DISCUSS/DSCN MKR DOCD: CPT | Performed by: INTERNAL MEDICINE

## 2024-08-05 NOTE — PROGRESS NOTES
Help Needed   Walk across the room (includes cane/walker) No Help Needed No Help Needed   Using the telphone No Help Needed No Help Needed   Taking your medications No Help Needed No Help Needed   Preparing meals No Help Needed No Help Needed   Managing money (expenses/bills) No Help Needed No Help Needed   Moderately strenuous housework (laundry) No Help Needed No Help Needed   Shopping for personal items (toiletries/medicines) No Help Needed No Help Needed   Shopping for groceries No Help Needed No Help Needed   Driving No Help Needed No Help Needed   Climbing a flight of stairs No Help Needed No Help Needed   Getting to places beyond walking distances No Help Needed No Help Needed           8/5/2024    10:00 AM   AMB Abuse Screening   Do you ever feel afraid of your partner? N   Are you in a relationship with someone who physically or mentally threatens you? N   Is it safe for you to go home? Y       Advance Care Planning     The patient has appointed the following active healthcare agents:    Primary Decision Maker: Ariana Mclaughlin - [facility] - [phone]  
SUBHA Mclaughlin is here for Medicare AWV, Immunizations (VIIS Verified), and Atrial Fibrillation    Assessment & Plan   Medicare annual wellness visit, subsequent  Paroxysmal atrial fibrillation (HCC)  S/P lumbar laminectomy  Mixed hyperlipidemia  -     TSH; Future  -     CBC with Auto Differential; Future  -     Comprehensive Metabolic Panel; Future  -     Lipid Panel; Future  Vitamin D deficiency  -     Vitamin D 25 Hydroxy; Future  Screening for prostate cancer  -     PSA Screening; Future  Neck and shoulder pain    Recommendations for Preventive Services Due: see orders and patient instructions/AVS.  Recommended screening schedule for the next 5-10 years is provided to the patient in written form: see Patient Instructions/AVS.     No follow-ups on file.     Subjective     Patient's complete Health Risk Assessment and screening values have been reviewed and are found in Flowsheets. The following problems were reviewed today and where indicated follow up appointments were made and/or referrals ordered.    Positive Risk Factor Screenings with Interventions:                      Safety:  Do you have any tripping hazards - loose or unsecured carpets or rugs?: (!) Yes    Interventions:  See AVS for additional education material               Objective   Vitals:    08/05/24 1056   BP: 108/74   Site: Left Upper Arm   Position: Sitting   Cuff Size: Medium Adult   Pulse: 64   Resp: 18   Temp: 97.6 °F (36.4 °C)   TempSrc: Temporal   SpO2: 96%   Weight: 91.9 kg (202 lb 9.6 oz)   Height: 1.829 m (6')      Body mass index is 27.48 kg/m².                 No Known Allergies  Prior to Visit Medications    Medication Sig Taking? Authorizing Provider   apixaban (ELIQUIS) 5 MG TABS tablet Take 1 tablet by mouth 2 times daily as needed Yes Christopher Ruff MD   flecainide (TAMBOCOR) 100 MG tablet Take 2 tablets by mouth once as needed Yes Christopher Ruff MD   Cholecalciferol (VITAMIN D3) 50 MCG (2000 UT) CAPS Take by mouth

## 2024-08-06 LAB
25(OH)D3 SERPL-MCNC: 34.9 NG/ML (ref 30–100)
ALBUMIN/GLOB SERPL: 1.4 (ref 1.1–2.2)
ALP SERPL-CCNC: 55 U/L (ref 45–117)
ALT SERPL-CCNC: 22 U/L (ref 12–78)
ANION GAP SERPL CALC-SCNC: 4 MMOL/L (ref 5–15)
AST SERPL-CCNC: 19 U/L (ref 15–37)
BASOPHILS # BLD: 0 K/UL (ref 0–0.1)
BASOPHILS NFR BLD: 1 % (ref 0–1)
BILIRUB SERPL-MCNC: 0.8 MG/DL (ref 0.2–1)
BUN SERPL-MCNC: 19 MG/DL (ref 6–20)
BUN/CREAT SERPL: 20 (ref 12–20)
CALCIUM SERPL-MCNC: 9.1 MG/DL (ref 8.5–10.1)
CHLORIDE SERPL-SCNC: 106 MMOL/L (ref 97–108)
CHOLEST SERPL-MCNC: 235 MG/DL
CO2 SERPL-SCNC: 28 MMOL/L (ref 21–32)
CREAT SERPL-MCNC: 0.95 MG/DL (ref 0.7–1.3)
DIFFERENTIAL METHOD BLD: ABNORMAL
EOSINOPHIL # BLD: 0.1 K/UL (ref 0–0.4)
EOSINOPHIL NFR BLD: 1 % (ref 0–7)
ERYTHROCYTE [DISTWIDTH] IN BLOOD BY AUTOMATED COUNT: 12.2 % (ref 11.5–14.5)
GLOBULIN SER CALC-MCNC: 2.8 G/DL (ref 2–4)
GLUCOSE SERPL-MCNC: 106 MG/DL (ref 65–100)
HCT VFR BLD AUTO: 41.3 % (ref 36.6–50.3)
HDLC SERPL-MCNC: 59 MG/DL
HDLC SERPL: 4 (ref 0–5)
HGB BLD-MCNC: 15.1 G/DL (ref 12.1–17)
IMM GRANULOCYTES # BLD AUTO: 0 K/UL (ref 0–0.04)
IMM GRANULOCYTES NFR BLD AUTO: 0 % (ref 0–0.5)
LDLC SERPL CALC-MCNC: 148.8 MG/DL (ref 0–100)
LYMPHOCYTES # BLD: 1 K/UL (ref 0.8–3.5)
LYMPHOCYTES NFR BLD: 24 % (ref 12–49)
MCH RBC QN AUTO: 34.6 PG (ref 26–34)
MCHC RBC AUTO-ENTMCNC: 36.6 G/DL (ref 30–36.5)
MCV RBC AUTO: 94.7 FL (ref 80–99)
MONOCYTES # BLD: 0.4 K/UL (ref 0–1)
MONOCYTES NFR BLD: 9 % (ref 5–13)
NEUTS SEG # BLD: 2.8 K/UL (ref 1.8–8)
NEUTS SEG NFR BLD: 65 % (ref 32–75)
NRBC # BLD: 0 K/UL (ref 0–0.01)
NRBC BLD-RTO: 0 PER 100 WBC
PLATELET # BLD AUTO: 164 K/UL (ref 150–400)
PMV BLD AUTO: 10.5 FL (ref 8.9–12.9)
POTASSIUM SERPL-SCNC: 4.6 MMOL/L (ref 3.5–5.1)
PROT SERPL-MCNC: 6.7 G/DL (ref 6.4–8.2)
PSA SERPL-MCNC: 3.6 NG/ML (ref 0.01–4)
RBC # BLD AUTO: 4.36 M/UL (ref 4.1–5.7)
SODIUM SERPL-SCNC: 138 MMOL/L (ref 136–145)
TRIGL SERPL-MCNC: 136 MG/DL
TSH SERPL DL<=0.05 MIU/L-ACNC: 2.92 UIU/ML (ref 0.36–3.74)
VLDLC SERPL CALC-MCNC: 27.2 MG/DL
WBC # BLD AUTO: 4.3 K/UL (ref 4.1–11.1)

## 2024-11-14 ENCOUNTER — OFFICE VISIT (OUTPATIENT)
Age: 64
End: 2024-11-14

## 2024-11-14 VITALS
BODY MASS INDEX: 27.77 KG/M2 | WEIGHT: 205 LBS | OXYGEN SATURATION: 98 % | RESPIRATION RATE: 18 BRPM | DIASTOLIC BLOOD PRESSURE: 75 MMHG | SYSTOLIC BLOOD PRESSURE: 126 MMHG | HEART RATE: 65 BPM | HEIGHT: 72 IN

## 2024-11-14 DIAGNOSIS — I48.0 PAROXYSMAL ATRIAL FIBRILLATION (HCC): Primary | ICD-10-CM

## 2024-11-14 DIAGNOSIS — L57.0 ACTINIC KERATOSES: ICD-10-CM

## 2024-11-14 ASSESSMENT — PATIENT HEALTH QUESTIONNAIRE - PHQ9
1. LITTLE INTEREST OR PLEASURE IN DOING THINGS: NOT AT ALL
SUM OF ALL RESPONSES TO PHQ QUESTIONS 1-9: 0
SUM OF ALL RESPONSES TO PHQ QUESTIONS 1-9: 0
SUM OF ALL RESPONSES TO PHQ9 QUESTIONS 1 & 2: 0
2. FEELING DOWN, DEPRESSED OR HOPELESS: NOT AT ALL
SUM OF ALL RESPONSES TO PHQ QUESTIONS 1-9: 0
SUM OF ALL RESPONSES TO PHQ QUESTIONS 1-9: 0

## 2024-11-14 NOTE — PROGRESS NOTES
Duration 30 minutes primarily education, review of imaging, labs and records.    Assessment & Plan  1. Wart?.  The presence of a small white area under the nail was noted. Cryotherapy was performed on the affected area. The patient had previously treated the infection with antibiotics, which improved the condition.    2. Premature Ventricular Contractions (PVCs).  The patient continues to experience PVCs, which have not entirely resolved and are bothersome. He is still taking metoprolol to manage the symptoms.    3. Shoulder pain.  The patient is finishing up physical therapy for shoulder pain. He reports significant improvement, although not completely resolved. He was advised that he might have a small tear and could see an orthopedist if needed.    4. Health Maintenance.  The patient had a dermatology full-body exam in July, with no concerns noted.              Chief Complaint   Patient presents with    Skin Problem     Wants SebK frozen off forehead and possibly a wart on finger.          No orders of the defined types were placed in this encounter.      Bal Sheikh MD, FACP      History of Present Illness  The patient presents for evaluation of multiple medical concerns.    He has undergone an ablation procedure and is no longer taking apixaban or flecainide for atrial fibrillation. However, he continues to take metoprolol due to persistent premature ventricular contractions (PVCs), which are causing him significant discomfort.    He reports a painful area under the corner of his nail, which he had previously picked at, leading to a paronychial infection. He self-administered antibiotics, which improved the condition, but it remains sore. He suspects it might be a small wart. He has a history of recurrent warts on his fingers. A dermatology consultation in 07/2024 did not raise any concerns.    He is currently not taking meloxicam. He experiences periodic episodes of widespread body pain, which persist for

## 2024-11-14 NOTE — PROGRESS NOTES
Chemo Mclaughlin is a 64 y.o. male presenting for/with:    Chief Complaint   Patient presents with    Skin Problem     Wants SebK frozen off forehead and possibly a wart on finger.        Vitals:    11/14/24 1500   BP: 126/75   Site: Right Upper Arm   Position: Sitting   Cuff Size: Medium Adult   Pulse: 65   Resp: 18   SpO2: 98%   Weight: 93 kg (205 lb)   Height: 1.829 m (6')       Pain Scale: 0 - No pain/10  Pain Location:     \"Have you been to the ER, urgent care clinic since your last visit?  Hospitalized since your last visit?\"    NO    “Have you seen or consulted any other health care providers outside of [facility] since your last visit?”    NO                 11/14/2024     2:57 PM   PHQ-9    Little interest or pleasure in doing things 0   Feeling down, depressed, or hopeless 0   PHQ-2 Score 0   PHQ-9 Total Score 0           8/5/2024    10:50 AM 7/22/2024    11:00 AM 11/13/2023    10:00 AM 10/19/2023    11:20 AM 7/18/2023    11:00 AM 6/12/2023     9:30 AM 11/11/2021    12:00 AM   [facility] AMB LEARNING ASSESSMENT   Primary Learner Patient Patient Patient Patient Patient Patient Patient   Primary Language ENGLISH ENGLISH ENGLISH ENGLISH ENGLISH ENGLISH ENGLISH   Learning Preference DEMONSTRATION DEMONSTRATION DEMONSTRATION DEMONSTRATION DEMONSTRATION READING READING   Answered By patient PT pt pt pt self patient   Relationship to Learner SELF SELF SELF SELF SELF SELF SELF            11/14/2024     2:57 PM   Amb Fall Risk Assessment and TUG Test   Do you feel unsteady or are you worried about falling?  no   2 or more falls in past year? no   Fall with injury in past year? no           11/14/2024     2:00 PM 8/5/2024    10:00 AM 6/12/2023     9:00 AM   ADL ASSESSMENT   Feeding yourself No Help Needed No Help Needed No Help Needed   Getting from bed to chair No Help Needed No Help Needed No Help Needed   Getting dressed No Help Needed No Help Needed No Help Needed   Bathing or showering No Help Needed

## 2025-03-26 DIAGNOSIS — I48.0 PAROXYSMAL ATRIAL FIBRILLATION (HCC): ICD-10-CM

## 2025-03-26 DIAGNOSIS — M54.31 SCIATICA, RIGHT SIDE: ICD-10-CM

## 2025-03-27 RX ORDER — METOPROLOL SUCCINATE 100 MG/1
TABLET, EXTENDED RELEASE ORAL
Qty: 90 TABLET | Refills: 4 | Status: SHIPPED | OUTPATIENT
Start: 2025-03-27 | End: 2025-03-28 | Stop reason: SDUPTHER

## 2025-03-27 RX ORDER — MELOXICAM 15 MG/1
15 TABLET ORAL PRN
Qty: 90 TABLET | Refills: 3 | Status: SHIPPED | OUTPATIENT
Start: 2025-03-27 | End: 2025-03-28 | Stop reason: SDUPTHER

## 2025-03-28 DIAGNOSIS — I48.0 PAROXYSMAL ATRIAL FIBRILLATION (HCC): ICD-10-CM

## 2025-03-28 DIAGNOSIS — M54.31 SCIATICA, RIGHT SIDE: ICD-10-CM

## 2025-03-28 RX ORDER — MELOXICAM 15 MG/1
TABLET ORAL
Refills: 0 | OUTPATIENT
Start: 2025-03-28

## 2025-03-28 RX ORDER — METOPROLOL SUCCINATE 100 MG/1
TABLET, EXTENDED RELEASE ORAL
Refills: 0 | OUTPATIENT
Start: 2025-03-28

## 2025-03-29 RX ORDER — METOPROLOL SUCCINATE 100 MG/1
TABLET, EXTENDED RELEASE ORAL
Qty: 90 TABLET | Refills: 4 | Status: SHIPPED | OUTPATIENT
Start: 2025-03-29

## 2025-03-29 RX ORDER — MELOXICAM 15 MG/1
15 TABLET ORAL PRN
Qty: 90 TABLET | Refills: 3 | Status: SHIPPED | OUTPATIENT
Start: 2025-03-29

## 2025-07-16 NOTE — PROGRESS NOTES
ASSESSMENT and PLAN  1. Typical atrial flutter (HCC)  No recurrence s/p PVI and CTI ablation 12/12/2023.    2. PAF (paroxysmal atrial fibrillation) (HCC)  No symptoms of sustained recurrence s/p PVI and CTI ablation 12/12/2023.    3. PVCs (premature ventricular contractions)  Low burden (<1%) on monitor 10/2023.  Symptoms well-controlled on current dose of metoprolol succinate.       Follow-up in 12 months.  The patient has been instructed and agrees to call our office with any issues or other concerns related to their cardiac condition(s) and/or complaint(s).      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  He underwent successful PVI and CTI ablation at [facility] on 12/12/2023.  He and Dr. Parada developed a pill-in-pocket plan with Eliquis, metoprolol and flecainide for recurrent atrial fibrillation (plan: with onset of AF, take Eliquis in 2-3 hours, then start metoprolol tartrate 50 mg and then flecainide 200 mg per day).  No cardiac issues have developed since the last visit on 7/22/2024.  About once every 6 months, he experiences a brief period of heart racing lasting 3-4 seconds.  He denies experiencing sustained heart racing or palpitations.  He experiences isolated palpitations due to PVCs that are being treated with metoprolol succinate.  At one point, he tried decreasing the metoprolol succinate from 50 mg daily to 25 mg daily and he developed more frequent palpitations that improved when he went back up to 50 mg daily.  He denies chest pain, dyspnea, heart failure symptoms or stroke/TIA symptoms.  He is an avid cyclist and remains active without chest pain, dyspnea or recent changes in exercise capacity.    PERTINENT CARDIAC HISTORY: (Records reviewed and summarized below)  Atrial fibrillation, paroxysmal  ==> New onset 2011  ==> ETT 5/3/2016, stage V Gerardo, no ischemia  ==> ETT 1/19/2018,

## 2025-07-28 ENCOUNTER — OFFICE VISIT (OUTPATIENT)
Age: 65
End: 2025-07-28
Payer: MEDICARE

## 2025-07-28 VITALS
HEIGHT: 72 IN | SYSTOLIC BLOOD PRESSURE: 112 MMHG | DIASTOLIC BLOOD PRESSURE: 80 MMHG | TEMPERATURE: 97.7 F | WEIGHT: 204 LBS | HEART RATE: 69 BPM | OXYGEN SATURATION: 99 % | BODY MASS INDEX: 27.63 KG/M2

## 2025-07-28 DIAGNOSIS — I48.3 TYPICAL ATRIAL FLUTTER (HCC): Primary | ICD-10-CM

## 2025-07-28 DIAGNOSIS — I48.0 PAF (PAROXYSMAL ATRIAL FIBRILLATION) (HCC): ICD-10-CM

## 2025-07-28 DIAGNOSIS — I49.3 PVCS (PREMATURE VENTRICULAR CONTRACTIONS): ICD-10-CM

## 2025-07-28 PROCEDURE — G8428 CUR MEDS NOT DOCUMENT: HCPCS | Performed by: INTERNAL MEDICINE

## 2025-07-28 PROCEDURE — 99213 OFFICE O/P EST LOW 20 MIN: CPT | Performed by: INTERNAL MEDICINE

## 2025-07-28 PROCEDURE — 1123F ACP DISCUSS/DSCN MKR DOCD: CPT | Performed by: INTERNAL MEDICINE

## 2025-07-28 PROCEDURE — 1036F TOBACCO NON-USER: CPT | Performed by: INTERNAL MEDICINE

## 2025-07-28 PROCEDURE — 93000 ELECTROCARDIOGRAM COMPLETE: CPT | Performed by: INTERNAL MEDICINE

## 2025-07-28 PROCEDURE — G8419 CALC BMI OUT NRM PARAM NOF/U: HCPCS | Performed by: INTERNAL MEDICINE

## 2025-07-28 PROCEDURE — 1126F AMNT PAIN NOTED NONE PRSNT: CPT | Performed by: INTERNAL MEDICINE

## 2025-07-28 PROCEDURE — 3017F COLORECTAL CA SCREEN DOC REV: CPT | Performed by: INTERNAL MEDICINE

## 2025-07-28 RX ORDER — METOPROLOL TARTRATE 50 MG
50 TABLET ORAL 2 TIMES DAILY PRN
Qty: 60 TABLET | Refills: 0 | Status: SHIPPED | OUTPATIENT
Start: 2025-07-28

## 2025-07-28 ASSESSMENT — PATIENT HEALTH QUESTIONNAIRE - PHQ9
SUM OF ALL RESPONSES TO PHQ QUESTIONS 1-9: 0
SUM OF ALL RESPONSES TO PHQ QUESTIONS 1-9: 0
2. FEELING DOWN, DEPRESSED OR HOPELESS: NOT AT ALL
SUM OF ALL RESPONSES TO PHQ QUESTIONS 1-9: 0
SUM OF ALL RESPONSES TO PHQ QUESTIONS 1-9: 0
1. LITTLE INTEREST OR PLEASURE IN DOING THINGS: NOT AT ALL

## 2025-07-28 NOTE — PROGRESS NOTES
Identified pt with two pt identifiers(name and ). Reviewed record in preparation for visit and have obtained necessary documentation.  Chief Complaint   Patient presents with    Atrial Fibrillation    Other     PVC      /80 (BP Site: Left Upper Arm, Patient Position: Sitting, BP Cuff Size: Medium Adult)   Pulse 69   Temp 97.7 °F (36.5 °C) (Temporal)   Ht 1.829 m (6')   Wt 92.5 kg (204 lb)   SpO2 99%   BMI 27.67 kg/m²       Medications reviewed/approved by provider.      Health Maintenance Review: Patient reminded of \"due or due soon\" health maintenance. I have asked the patient to contact his/her primary care provider (PCP) for follow-up on his/her health maintenance.    Coordination of Care Questionnaire:  :   1) Have you been to an emergency room, urgent care, or hospitalized since your last visit?  If yes, where when, and reason for visit? no       2. Have seen or consulted any other health care provider since your last visit?   If yes, where when, and reason for visit?  no      Patient is accompanied by self I have received verbal consent from Chemo Mclaughlin to discuss any/all medical information while they are present in the room.

## (undated) DEVICE — Device

## (undated) DEVICE — MEDI-VAC NON-CONDUCTIVE SUCTION TUBING: Brand: CARDINAL HEALTH

## (undated) DEVICE — SET ADMIN 16ML TBNG L100IN 2 Y INJ SITE IV PIGGY BK DISP

## (undated) DEVICE — DEVON™ KNEE AND BODY STRAP 60" X 3" (1.5 M X 7.6 CM): Brand: DEVON

## (undated) DEVICE — INTENDED FOR TISSUE SEPARATION, AND OTHER PROCEDURES THAT REQUIRE A SHARP SURGICAL BLADE TO PUNCTURE OR CUT.: Brand: BARD-PARKER ® CARBON RIB-BACK BLADES

## (undated) DEVICE — NEONATAL-ADULT SPO2 SENSOR: Brand: NELLCOR

## (undated) DEVICE — STERILE POLYISOPRENE POWDER-FREE SURGICAL GLOVES: Brand: PROTEXIS

## (undated) DEVICE — CATHETER IV 20GA L1.25IN FEP STR HUB TEF INTROCAN SFTY

## (undated) DEVICE — LAMINECTOMY RICHMOND-LF: Brand: MEDLINE INDUSTRIES, INC.

## (undated) DEVICE — SYR 3ML LL TIP 1/10ML GRAD --

## (undated) DEVICE — SOLUTION IV 1000ML 0.9% SOD CHL

## (undated) DEVICE — KENDALL RADIOLUCENT FOAM MONITORING ELECTRODE RECTANGULAR SHAPE: Brand: KENDALL

## (undated) DEVICE — TOOL 14BA50 LEGEND 14CM 5MM BA: Brand: MIDAS REX ™

## (undated) DEVICE — BASIN EMSIS 16OZ GRAPHITE PLAS KID SHP MOLD GRAD FOR ORAL

## (undated) DEVICE — CORD ELECSURG BPLR 12 FT DISP [810T818750] [ADLER INSTRUMENT CO]

## (undated) DEVICE — KENDALL SCD EXPRESS SLEEVES, KNEE LENGTH, MEDIUM: Brand: KENDALL SCD

## (undated) DEVICE — FLOSEAL HEMOSTATIC MATRIX, 5 ML: Brand: FLOSEAL

## (undated) DEVICE — GOWN,ISO,KNITCUFF, PREMIUM BLUE, LV3,XL: Brand: MEDLINE INDUSTRIES, INC.

## (undated) DEVICE — NEEDLE HYPO 25GA L1.5IN BVL ORIENTED ECLIPSE

## (undated) DEVICE — HANDLE LT SNAP ON ULT DURABLE LENS FOR TRUMPF ALC DISPOSABLE

## (undated) DEVICE — CATH IV AUTOGRD BC PNK 20GA 25 -- INSYTE

## (undated) DEVICE — PILLOW POS AD L7IN R FOAM HD REST INTUB SLOT DISP

## (undated) DEVICE — Z DISCONTINUED PER MEDLINE LINE GAS SAMPLING O2/CO2 LNG AD 13 FT NSL W/ TBNG FILTERLINE

## (undated) DEVICE — SUTURE VCRL SZ 3-0 L18IN ABSRB UD CP-2 L26MM 1/2 CIR REV J761D

## (undated) DEVICE — Z CONVERTED USE 2107985 COVER FLROSCP W36XL28IN 4 SIDE ADH

## (undated) DEVICE — SYR 10ML LUER LOK 1/5ML GRAD --

## (undated) DEVICE — SUTURE VCRL SZ 0 L45CM ABSRB VLT OS-6 L36.4MM 1/2 CIR REV J711T

## (undated) DEVICE — TRAY PREP DRY W/ PREM GLV 2 APPL 6 SPNG 2 UNDPD 1 OVERWRAP

## (undated) DEVICE — 3000CC GUARDIAN II: Brand: GUARDIAN

## (undated) DEVICE — SYRINGE MED 20ML STD CLR PLAS LUERLOCK TIP N CTRL DISP

## (undated) DEVICE — BLUNT CANNULA: Brand: MONOJECT

## (undated) DEVICE — INFECTION CONTROL KIT SYS

## (undated) DEVICE — SOLIDIFIER MEDC 1200ML -- CONVERT TO 356117

## (undated) DEVICE — 1200 GUARD II KIT W/5MM TUBE W/O VAC TUBE: Brand: GUARDIAN

## (undated) DEVICE — LINER,SEMI-RIGID,3000CC,50EA/CS: Brand: MEDLINE

## (undated) DEVICE — NEEDLE HYPO 18GA L1.5IN PNK S STL HUB POLYPR SHLD REG BVL

## (undated) DEVICE — DRAPE FLD WRM W44XL66IN C6L FOR INTRATEMP SYS THERMABASIN

## (undated) DEVICE — FORCEPS BX L240CM JAW DIA2.2MM RAD JAW 4 HOT DISP

## (undated) DEVICE — BONE WAX WHITE: Brand: BONE WAX WHITE

## (undated) DEVICE — KIT ENDO OP4 CA 1.1+ BX20

## (undated) DEVICE — SKIN MARKER,REGULAR TIP WITH RULER AND LABELS: Brand: DEVON

## (undated) DEVICE — SUTURE MCRYL SZ 4-0 L27IN ABSRB UD L19MM PS-2 1/2 CIR PRIM Y426H

## (undated) DEVICE — WATERPROOF, BACTERIA PROOF DRESSING WITH ABSORBENT SEE THROUGH PAD: Brand: OPSITE POST-OP VISIBLE 15X10CM CTN 20

## (undated) DEVICE — COVER,MAYO STAND,STERILE: Brand: MEDLINE

## (undated) DEVICE — SOLUTION IRRIG 1000ML STRL H2O USP PLAS POUR BTL

## (undated) DEVICE — CUFF ADULT 1 PC 1 VINYL DISP --

## (undated) DEVICE — SYRINGE 20ML LL S/C 50

## (undated) DEVICE — TOWEL 4 PLY TISS 19X30 SUE WHT